# Patient Record
Sex: MALE | Race: WHITE | NOT HISPANIC OR LATINO | URBAN - METROPOLITAN AREA
[De-identification: names, ages, dates, MRNs, and addresses within clinical notes are randomized per-mention and may not be internally consistent; named-entity substitution may affect disease eponyms.]

---

## 2017-03-31 ENCOUNTER — OUTPATIENT (OUTPATIENT)
Dept: OUTPATIENT SERVICES | Facility: HOSPITAL | Age: 52
LOS: 1 days | End: 2017-03-31

## 2017-04-21 ENCOUNTER — EMERGENCY (EMERGENCY)
Facility: HOSPITAL | Age: 52
LOS: 1 days | End: 2017-04-21
Payer: MEDICARE

## 2017-04-21 PROCEDURE — 74020: CPT | Mod: 26

## 2017-04-21 PROCEDURE — 99284 EMERGENCY DEPT VISIT MOD MDM: CPT

## 2017-04-21 PROCEDURE — 71260 CT THORAX DX C+: CPT | Mod: 26

## 2017-04-21 PROCEDURE — 74177 CT ABD & PELVIS W/CONTRAST: CPT | Mod: 26

## 2017-04-21 PROCEDURE — 71020: CPT | Mod: 26

## 2017-04-22 PROCEDURE — 73610 X-RAY EXAM OF ANKLE: CPT | Mod: 26,LT

## 2017-05-31 ENCOUNTER — OUTPATIENT (OUTPATIENT)
Dept: OUTPATIENT SERVICES | Facility: HOSPITAL | Age: 52
LOS: 1 days | End: 2017-05-31

## 2017-07-21 ENCOUNTER — OUTPATIENT (OUTPATIENT)
Dept: OUTPATIENT SERVICES | Facility: HOSPITAL | Age: 52
LOS: 1 days | End: 2017-07-21

## 2017-08-27 ENCOUNTER — OUTPATIENT (OUTPATIENT)
Dept: OUTPATIENT SERVICES | Facility: HOSPITAL | Age: 52
LOS: 1 days | End: 2017-08-27

## 2017-10-04 ENCOUNTER — OUTPATIENT (OUTPATIENT)
Dept: OUTPATIENT SERVICES | Facility: HOSPITAL | Age: 52
LOS: 1 days | End: 2017-10-04

## 2017-10-04 ENCOUNTER — INPATIENT (INPATIENT)
Facility: HOSPITAL | Age: 52
LOS: 5 days | Discharge: HOME | End: 2017-10-10
Payer: MEDICARE

## 2017-10-04 PROCEDURE — 74177 CT ABD & PELVIS W/CONTRAST: CPT | Mod: 26

## 2017-10-04 PROCEDURE — 71020: CPT | Mod: 26

## 2017-10-04 PROCEDURE — 99285 EMERGENCY DEPT VISIT HI MDM: CPT

## 2017-10-04 PROCEDURE — 74000: CPT | Mod: 26

## 2017-10-05 ENCOUNTER — OUTPATIENT (OUTPATIENT)
Dept: OUTPATIENT SERVICES | Facility: HOSPITAL | Age: 52
LOS: 1 days | End: 2017-10-05

## 2017-10-06 ENCOUNTER — OUTPATIENT (OUTPATIENT)
Dept: OUTPATIENT SERVICES | Facility: HOSPITAL | Age: 52
LOS: 1 days | End: 2017-10-06

## 2017-10-07 ENCOUNTER — OUTPATIENT (OUTPATIENT)
Dept: OUTPATIENT SERVICES | Facility: HOSPITAL | Age: 52
LOS: 1 days | End: 2017-10-07

## 2017-10-08 ENCOUNTER — OUTPATIENT (OUTPATIENT)
Dept: OUTPATIENT SERVICES | Facility: HOSPITAL | Age: 52
LOS: 1 days | End: 2017-10-08

## 2017-10-09 ENCOUNTER — OUTPATIENT (OUTPATIENT)
Dept: OUTPATIENT SERVICES | Facility: HOSPITAL | Age: 52
LOS: 1 days | End: 2017-10-09

## 2017-10-10 ENCOUNTER — OUTPATIENT (OUTPATIENT)
Dept: OUTPATIENT SERVICES | Facility: HOSPITAL | Age: 52
LOS: 1 days | End: 2017-10-10

## 2017-10-11 ENCOUNTER — OUTPATIENT (OUTPATIENT)
Dept: OUTPATIENT SERVICES | Facility: HOSPITAL | Age: 52
LOS: 1 days | End: 2017-10-11

## 2017-10-23 ENCOUNTER — OUTPATIENT (OUTPATIENT)
Dept: OUTPATIENT SERVICES | Facility: HOSPITAL | Age: 52
LOS: 1 days | End: 2017-10-23

## 2017-10-31 ENCOUNTER — OUTPATIENT (OUTPATIENT)
Dept: OUTPATIENT SERVICES | Facility: HOSPITAL | Age: 52
LOS: 1 days | End: 2017-10-31

## 2017-12-11 ENCOUNTER — EMERGENCY (EMERGENCY)
Facility: HOSPITAL | Age: 52
LOS: 1 days | End: 2017-12-11
Payer: MEDICARE

## 2017-12-11 ENCOUNTER — OUTPATIENT (OUTPATIENT)
Dept: OUTPATIENT SERVICES | Facility: HOSPITAL | Age: 52
LOS: 1 days | End: 2017-12-11
Payer: MEDICARE

## 2017-12-11 PROCEDURE — 71250 CT THORAX DX C-: CPT | Mod: 26

## 2017-12-11 PROCEDURE — 74230 X-RAY XM SWLNG FUNCJ C+: CPT | Mod: 26

## 2017-12-11 PROCEDURE — 71010: CPT | Mod: 26

## 2017-12-11 PROCEDURE — 99285 EMERGENCY DEPT VISIT HI MDM: CPT

## 2018-01-24 PROCEDURE — 74177 CT ABD & PELVIS W/CONTRAST: CPT | Mod: 26

## 2018-01-24 PROCEDURE — 71045 X-RAY EXAM CHEST 1 VIEW: CPT | Mod: 26

## 2018-01-24 PROCEDURE — 99285 EMERGENCY DEPT VISIT HI MDM: CPT

## 2018-01-25 ENCOUNTER — INPATIENT (INPATIENT)
Facility: HOSPITAL | Age: 53
LOS: 5 days | Discharge: HOME | End: 2018-01-31
Payer: MEDICARE

## 2018-01-25 ENCOUNTER — OUTPATIENT (OUTPATIENT)
Dept: OUTPATIENT SERVICES | Facility: HOSPITAL | Age: 53
LOS: 1 days | End: 2018-01-25

## 2018-01-26 ENCOUNTER — OUTPATIENT (OUTPATIENT)
Dept: OUTPATIENT SERVICES | Facility: HOSPITAL | Age: 53
LOS: 1 days | End: 2018-01-26

## 2018-01-26 PROCEDURE — 71045 X-RAY EXAM CHEST 1 VIEW: CPT | Mod: 26

## 2018-01-27 ENCOUNTER — OUTPATIENT (OUTPATIENT)
Dept: OUTPATIENT SERVICES | Facility: HOSPITAL | Age: 53
LOS: 1 days | End: 2018-01-27

## 2018-01-28 ENCOUNTER — OUTPATIENT (OUTPATIENT)
Dept: OUTPATIENT SERVICES | Facility: HOSPITAL | Age: 53
LOS: 1 days | End: 2018-01-28

## 2018-01-29 ENCOUNTER — OUTPATIENT (OUTPATIENT)
Dept: OUTPATIENT SERVICES | Facility: HOSPITAL | Age: 53
LOS: 1 days | End: 2018-01-29

## 2018-01-30 ENCOUNTER — OUTPATIENT (OUTPATIENT)
Dept: OUTPATIENT SERVICES | Facility: HOSPITAL | Age: 53
LOS: 1 days | End: 2018-01-30

## 2018-01-30 PROCEDURE — 74230 X-RAY XM SWLNG FUNCJ C+: CPT | Mod: 26

## 2018-01-31 ENCOUNTER — OUTPATIENT (OUTPATIENT)
Dept: OUTPATIENT SERVICES | Facility: HOSPITAL | Age: 53
LOS: 1 days | End: 2018-01-31

## 2018-03-27 ENCOUNTER — INPATIENT (INPATIENT)
Facility: HOSPITAL | Age: 53
LOS: 7 days | Discharge: NURSING, REHAB OR LONG-TERM CARE | End: 2018-04-04
Payer: MEDICARE

## 2018-03-27 ENCOUNTER — OUTPATIENT (OUTPATIENT)
Dept: OUTPATIENT SERVICES | Facility: HOSPITAL | Age: 53
LOS: 1 days | End: 2018-03-27

## 2018-03-27 ENCOUNTER — OUTPATIENT (OUTPATIENT)
Dept: OUTPATIENT SERVICES | Facility: HOSPITAL | Age: 53
LOS: 1 days | End: 2018-03-27
Payer: MEDICARE

## 2018-03-27 PROCEDURE — 74230 X-RAY XM SWLNG FUNCJ C+: CPT | Mod: 26

## 2018-03-27 PROCEDURE — 71045 X-RAY EXAM CHEST 1 VIEW: CPT | Mod: 26

## 2018-03-27 PROCEDURE — 99284 EMERGENCY DEPT VISIT MOD MDM: CPT

## 2018-03-28 ENCOUNTER — OUTPATIENT (OUTPATIENT)
Dept: OUTPATIENT SERVICES | Facility: HOSPITAL | Age: 53
LOS: 1 days | End: 2018-03-28

## 2018-03-28 PROCEDURE — 93306 TTE W/DOPPLER COMPLETE: CPT | Mod: 26

## 2018-03-28 PROCEDURE — 99223 1ST HOSP IP/OBS HIGH 75: CPT

## 2018-03-29 ENCOUNTER — OUTPATIENT (OUTPATIENT)
Dept: OUTPATIENT SERVICES | Facility: HOSPITAL | Age: 53
LOS: 1 days | End: 2018-03-29

## 2018-03-29 PROCEDURE — 99232 SBSQ HOSP IP/OBS MODERATE 35: CPT

## 2018-03-30 ENCOUNTER — OUTPATIENT (OUTPATIENT)
Dept: OUTPATIENT SERVICES | Facility: HOSPITAL | Age: 53
LOS: 1 days | End: 2018-03-30

## 2018-03-31 ENCOUNTER — OUTPATIENT (OUTPATIENT)
Dept: OUTPATIENT SERVICES | Facility: HOSPITAL | Age: 53
LOS: 1 days | End: 2018-03-31

## 2018-04-01 ENCOUNTER — OUTPATIENT (OUTPATIENT)
Dept: OUTPATIENT SERVICES | Facility: HOSPITAL | Age: 53
LOS: 1 days | End: 2018-04-01

## 2018-04-02 ENCOUNTER — OUTPATIENT (OUTPATIENT)
Dept: OUTPATIENT SERVICES | Facility: HOSPITAL | Age: 53
LOS: 1 days | End: 2018-04-02

## 2018-05-17 ENCOUNTER — OUTPATIENT (OUTPATIENT)
Dept: OUTPATIENT SERVICES | Facility: HOSPITAL | Age: 53
LOS: 1 days | End: 2018-05-17

## 2018-07-18 ENCOUNTER — OUTPATIENT (OUTPATIENT)
Dept: OUTPATIENT SERVICES | Facility: HOSPITAL | Age: 53
LOS: 1 days | End: 2018-07-18

## 2018-07-26 ENCOUNTER — EMERGENCY (EMERGENCY)
Facility: HOSPITAL | Age: 53
LOS: 1 days | End: 2018-07-26
Payer: MEDICARE

## 2018-07-26 ENCOUNTER — INPATIENT (INPATIENT)
Facility: HOSPITAL | Age: 53
LOS: 10 days | Discharge: NURSING, REHAB OR LONG-TERM CARE | DRG: 82 | End: 2018-08-06
Attending: SURGERY | Admitting: SURGERY
Payer: MEDICARE

## 2018-07-26 VITALS — HEART RATE: 98 BPM | OXYGEN SATURATION: 99 %

## 2018-07-26 DIAGNOSIS — S06.5X9A TRAUMATIC SUBDURAL HEMORRHAGE WITH LOSS OF CONSCIOUSNESS OF UNSPECIFIED DURATION, INITIAL ENCOUNTER: ICD-10-CM

## 2018-07-26 LAB
ALBUMIN SERPL ELPH-MCNC: 3.2 G/DL — LOW (ref 3.3–5.2)
ALP SERPL-CCNC: 106 U/L — SIGNIFICANT CHANGE UP (ref 40–120)
ALT FLD-CCNC: 48 U/L — HIGH
ANION GAP SERPL CALC-SCNC: 11 MMOL/L — SIGNIFICANT CHANGE UP (ref 5–17)
ANION GAP SERPL CALC-SCNC: 12 MMOL/L — SIGNIFICANT CHANGE UP (ref 5–17)
ANISOCYTOSIS BLD QL: SLIGHT — SIGNIFICANT CHANGE UP
APPEARANCE UR: CLEAR — SIGNIFICANT CHANGE UP
APTT BLD: 40.5 SEC — HIGH (ref 27.5–37.4)
AST SERPL-CCNC: 45 U/L — HIGH
BASOPHILS # BLD AUTO: 0 K/UL — SIGNIFICANT CHANGE UP (ref 0–0.2)
BASOPHILS NFR BLD AUTO: 0.1 % — SIGNIFICANT CHANGE UP (ref 0–2)
BILIRUB SERPL-MCNC: 0.5 MG/DL — SIGNIFICANT CHANGE UP (ref 0.4–2)
BILIRUB UR-MCNC: NEGATIVE — SIGNIFICANT CHANGE UP
BLD GP AB SCN SERPL QL: SIGNIFICANT CHANGE UP
BUN SERPL-MCNC: 19 MG/DL — SIGNIFICANT CHANGE UP (ref 8–20)
BUN SERPL-MCNC: 24 MG/DL — HIGH (ref 8–20)
CALCIUM SERPL-MCNC: 6.4 MG/DL — CRITICAL LOW (ref 8.6–10.2)
CALCIUM SERPL-MCNC: 8.4 MG/DL — LOW (ref 8.6–10.2)
CHLORIDE SERPL-SCNC: 106 MMOL/L — SIGNIFICANT CHANGE UP (ref 98–107)
CHLORIDE SERPL-SCNC: 114 MMOL/L — HIGH (ref 98–107)
CK SERPL-CCNC: 48 U/L — SIGNIFICANT CHANGE UP (ref 30–200)
CO2 SERPL-SCNC: 15 MMOL/L — LOW (ref 22–29)
CO2 SERPL-SCNC: 23 MMOL/L — SIGNIFICANT CHANGE UP (ref 22–29)
COLOR SPEC: YELLOW — SIGNIFICANT CHANGE UP
CREAT SERPL-MCNC: 0.46 MG/DL — LOW (ref 0.5–1.3)
CREAT SERPL-MCNC: 0.56 MG/DL — SIGNIFICANT CHANGE UP (ref 0.5–1.3)
DIFF PNL FLD: NEGATIVE — SIGNIFICANT CHANGE UP
EOSINOPHIL # BLD AUTO: 0 K/UL — SIGNIFICANT CHANGE UP (ref 0–0.5)
EOSINOPHIL NFR BLD AUTO: 0.1 % — SIGNIFICANT CHANGE UP (ref 0–5)
EPI CELLS # UR: SIGNIFICANT CHANGE UP
ETHANOL SERPL-MCNC: <10 MG/DL — SIGNIFICANT CHANGE UP
GAS PNL BLDA: SIGNIFICANT CHANGE UP
GAS PNL BLDA: SIGNIFICANT CHANGE UP
GLUCOSE BLDC GLUCOMTR-MCNC: 139 MG/DL — HIGH (ref 70–99)
GLUCOSE SERPL-MCNC: 140 MG/DL — HIGH (ref 70–115)
GLUCOSE SERPL-MCNC: 85 MG/DL — SIGNIFICANT CHANGE UP (ref 70–115)
GLUCOSE UR QL: NEGATIVE MG/DL — SIGNIFICANT CHANGE UP
HCT VFR BLD CALC: 32.7 % — LOW (ref 42–52)
HCT VFR BLD CALC: 34.1 % — LOW (ref 42–52)
HGB BLD-MCNC: 10.7 G/DL — LOW (ref 14–18)
HGB BLD-MCNC: 11 G/DL — LOW (ref 14–18)
HYPOCHROMIA BLD QL: SLIGHT — SIGNIFICANT CHANGE UP
INR BLD: 1.14 RATIO — SIGNIFICANT CHANGE UP (ref 0.88–1.16)
KETONES UR-MCNC: ABNORMAL
LEUKOCYTE ESTERASE UR-ACNC: NEGATIVE — SIGNIFICANT CHANGE UP
LIDOCAIN IGE QN: 35 U/L — SIGNIFICANT CHANGE UP (ref 22–51)
LYMPHOCYTES # BLD AUTO: 1.1 K/UL — SIGNIFICANT CHANGE UP (ref 1–4.8)
LYMPHOCYTES # BLD AUTO: 12 % — LOW (ref 20–55)
LYMPHOCYTES # BLD AUTO: 9.4 % — LOW (ref 20–55)
MACROCYTES BLD QL: SLIGHT — SIGNIFICANT CHANGE UP
MAGNESIUM SERPL-MCNC: 1.2 MG/DL — LOW (ref 1.6–2.6)
MCHC RBC-ENTMCNC: 29.8 PG — SIGNIFICANT CHANGE UP (ref 27–31)
MCHC RBC-ENTMCNC: 29.8 PG — SIGNIFICANT CHANGE UP (ref 27–31)
MCHC RBC-ENTMCNC: 32.3 G/DL — SIGNIFICANT CHANGE UP (ref 32–36)
MCHC RBC-ENTMCNC: 32.7 G/DL — SIGNIFICANT CHANGE UP (ref 32–36)
MCV RBC AUTO: 91.1 FL — SIGNIFICANT CHANGE UP (ref 80–94)
MCV RBC AUTO: 92.4 FL — SIGNIFICANT CHANGE UP (ref 80–94)
MICROCYTES BLD QL: SLIGHT — SIGNIFICANT CHANGE UP
MONOCYTES # BLD AUTO: 0.4 K/UL — SIGNIFICANT CHANGE UP (ref 0–0.8)
MONOCYTES NFR BLD AUTO: 3.2 % — SIGNIFICANT CHANGE UP (ref 3–10)
MONOCYTES NFR BLD AUTO: 4 % — SIGNIFICANT CHANGE UP (ref 3–10)
NEUTROPHILS # BLD AUTO: 10.3 K/UL — HIGH (ref 1.8–8)
NEUTROPHILS NFR BLD AUTO: 82 % — HIGH (ref 37–73)
NEUTROPHILS NFR BLD AUTO: 86.9 % — HIGH (ref 37–73)
NITRITE UR-MCNC: NEGATIVE — SIGNIFICANT CHANGE UP
PH UR: 8 — SIGNIFICANT CHANGE UP (ref 5–8)
PHOSPHATE SERPL-MCNC: 2.4 MG/DL — SIGNIFICANT CHANGE UP (ref 2.4–4.7)
PLAT MORPH BLD: NORMAL — SIGNIFICANT CHANGE UP
PLATELET # BLD AUTO: 107 K/UL — LOW (ref 150–400)
PLATELET # BLD AUTO: 83 K/UL — LOW (ref 150–400)
POIKILOCYTOSIS BLD QL AUTO: SLIGHT — SIGNIFICANT CHANGE UP
POTASSIUM SERPL-MCNC: 3.9 MMOL/L — SIGNIFICANT CHANGE UP (ref 3.5–5.3)
POTASSIUM SERPL-MCNC: 4.1 MMOL/L — SIGNIFICANT CHANGE UP (ref 3.5–5.3)
POTASSIUM SERPL-SCNC: 3.9 MMOL/L — SIGNIFICANT CHANGE UP (ref 3.5–5.3)
POTASSIUM SERPL-SCNC: 4.1 MMOL/L — SIGNIFICANT CHANGE UP (ref 3.5–5.3)
PROT SERPL-MCNC: 6.3 G/DL — LOW (ref 6.6–8.7)
PROT UR-MCNC: 15 MG/DL
PROTHROM AB SERPL-ACNC: 12.6 SEC — SIGNIFICANT CHANGE UP (ref 9.8–12.7)
RBC # BLD: 3.59 M/UL — LOW (ref 4.6–6.2)
RBC # BLD: 3.69 M/UL — LOW (ref 4.6–6.2)
RBC # FLD: 16.4 % — HIGH (ref 11–15.6)
RBC # FLD: 16.5 % — HIGH (ref 11–15.6)
RBC BLD AUTO: ABNORMAL
SODIUM SERPL-SCNC: 140 MMOL/L — SIGNIFICANT CHANGE UP (ref 135–145)
SODIUM SERPL-SCNC: 141 MMOL/L — SIGNIFICANT CHANGE UP (ref 135–145)
SP GR SPEC: 1.01 — SIGNIFICANT CHANGE UP (ref 1.01–1.02)
T3 SERPL-MCNC: 88 NG/DL — SIGNIFICANT CHANGE UP (ref 80–200)
T4 AB SER-ACNC: 5.6 UG/DL — SIGNIFICANT CHANGE UP (ref 4.5–12)
TROPONIN T SERPL-MCNC: <0.01 NG/ML — SIGNIFICANT CHANGE UP (ref 0–0.06)
TSH SERPL-MCNC: 3.75 UIU/ML — SIGNIFICANT CHANGE UP (ref 0.27–4.2)
TYPE + AB SCN PNL BLD: SIGNIFICANT CHANGE UP
UROBILINOGEN FLD QL: NEGATIVE MG/DL — SIGNIFICANT CHANGE UP
VALPROATE SERPL-MCNC: 11 UG/ML — LOW (ref 50–100)
VARIANT LYMPHS # BLD: 2 % — SIGNIFICANT CHANGE UP (ref 0–6)
WBC # BLD: 11.9 K/UL — HIGH (ref 4.8–10.8)
WBC # BLD: 7.3 K/UL — SIGNIFICANT CHANGE UP (ref 4.8–10.8)
WBC # FLD AUTO: 11.9 K/UL — HIGH (ref 4.8–10.8)
WBC # FLD AUTO: 7.3 K/UL — SIGNIFICANT CHANGE UP (ref 4.8–10.8)

## 2018-07-26 PROCEDURE — 70450 CT HEAD/BRAIN W/O DYE: CPT | Mod: 26

## 2018-07-26 PROCEDURE — 99291 CRITICAL CARE FIRST HOUR: CPT | Mod: GC,25

## 2018-07-26 PROCEDURE — 99291 CRITICAL CARE FIRST HOUR: CPT | Mod: 25

## 2018-07-26 PROCEDURE — 99222 1ST HOSP IP/OBS MODERATE 55: CPT | Mod: 25

## 2018-07-26 PROCEDURE — 36556 INSERT NON-TUNNEL CV CATH: CPT

## 2018-07-26 PROCEDURE — 93306 TTE W/DOPPLER COMPLETE: CPT | Mod: 26

## 2018-07-26 PROCEDURE — 93010 ELECTROCARDIOGRAM REPORT: CPT | Mod: 76

## 2018-07-26 PROCEDURE — 72125 CT NECK SPINE W/O DYE: CPT | Mod: 26,77

## 2018-07-26 PROCEDURE — 71045 X-RAY EXAM CHEST 1 VIEW: CPT | Mod: 26

## 2018-07-26 PROCEDURE — 71260 CT THORAX DX C+: CPT | Mod: 26

## 2018-07-26 PROCEDURE — 36620 INSERTION CATHETER ARTERY: CPT

## 2018-07-26 PROCEDURE — 70450 CT HEAD/BRAIN W/O DYE: CPT | Mod: 26,77

## 2018-07-26 PROCEDURE — 74177 CT ABD & PELVIS W/CONTRAST: CPT | Mod: 26

## 2018-07-26 PROCEDURE — 31500 INSERT EMERGENCY AIRWAY: CPT

## 2018-07-26 PROCEDURE — 71045 X-RAY EXAM CHEST 1 VIEW: CPT | Mod: 26,77

## 2018-07-26 PROCEDURE — 72125 CT NECK SPINE W/O DYE: CPT | Mod: 26

## 2018-07-26 PROCEDURE — 72170 X-RAY EXAM OF PELVIS: CPT | Mod: 26

## 2018-07-26 RX ORDER — GLUCAGON INJECTION, SOLUTION 0.5 MG/.1ML
1 INJECTION, SOLUTION SUBCUTANEOUS ONCE
Qty: 0 | Refills: 0 | Status: DISCONTINUED | OUTPATIENT
Start: 2018-07-26 | End: 2018-07-26

## 2018-07-26 RX ORDER — VALPROIC ACID (AS SODIUM SALT) 250 MG/5ML
500 SOLUTION, ORAL ORAL
Qty: 0 | Refills: 0 | Status: DISCONTINUED | OUTPATIENT
Start: 2018-07-26 | End: 2018-08-03

## 2018-07-26 RX ORDER — AMIODARONE HYDROCHLORIDE 400 MG/1
0.5 TABLET ORAL
Qty: 900 | Refills: 0 | Status: DISCONTINUED | OUTPATIENT
Start: 2018-07-26 | End: 2018-07-27

## 2018-07-26 RX ORDER — FENTANYL CITRATE 50 UG/ML
50 INJECTION INTRAVENOUS
Qty: 0 | Refills: 0 | Status: DISCONTINUED | OUTPATIENT
Start: 2018-07-26 | End: 2018-07-26

## 2018-07-26 RX ORDER — FENTANYL CITRATE 50 UG/ML
100 INJECTION INTRAVENOUS ONCE
Qty: 0 | Refills: 0 | Status: DISCONTINUED | OUTPATIENT
Start: 2018-07-26 | End: 2018-07-26

## 2018-07-26 RX ORDER — MAGNESIUM SULFATE 500 MG/ML
2 VIAL (ML) INJECTION
Qty: 0 | Refills: 0 | Status: COMPLETED | OUTPATIENT
Start: 2018-07-26 | End: 2018-07-26

## 2018-07-26 RX ORDER — SODIUM CHLORIDE 9 MG/ML
1000 INJECTION INTRAMUSCULAR; INTRAVENOUS; SUBCUTANEOUS ONCE
Qty: 0 | Refills: 0 | Status: COMPLETED | OUTPATIENT
Start: 2018-07-26 | End: 2018-07-26

## 2018-07-26 RX ORDER — PIPERACILLIN AND TAZOBACTAM 4; .5 G/20ML; G/20ML
3.38 INJECTION, POWDER, LYOPHILIZED, FOR SOLUTION INTRAVENOUS EVERY 8 HOURS
Qty: 0 | Refills: 0 | Status: DISCONTINUED | OUTPATIENT
Start: 2018-07-26 | End: 2018-07-30

## 2018-07-26 RX ORDER — FENTANYL CITRATE 50 UG/ML
50 INJECTION INTRAVENOUS ONCE
Qty: 0 | Refills: 0 | Status: DISCONTINUED | OUTPATIENT
Start: 2018-07-26 | End: 2018-07-26

## 2018-07-26 RX ORDER — POTASSIUM PHOSPHATE, MONOBASIC POTASSIUM PHOSPHATE, DIBASIC 236; 224 MG/ML; MG/ML
15 INJECTION, SOLUTION INTRAVENOUS ONCE
Qty: 0 | Refills: 0 | Status: COMPLETED | OUTPATIENT
Start: 2018-07-26 | End: 2018-07-26

## 2018-07-26 RX ORDER — NOREPINEPHRINE BITARTRATE/D5W 8 MG/250ML
0.05 PLASTIC BAG, INJECTION (ML) INTRAVENOUS
Qty: 8 | Refills: 0 | Status: DISCONTINUED | OUTPATIENT
Start: 2018-07-26 | End: 2018-07-27

## 2018-07-26 RX ORDER — SODIUM CHLORIDE 9 MG/ML
1000 INJECTION, SOLUTION INTRAVENOUS
Qty: 0 | Refills: 0 | Status: DISCONTINUED | OUTPATIENT
Start: 2018-07-26 | End: 2018-07-26

## 2018-07-26 RX ORDER — INSULIN LISPRO 100/ML
VIAL (ML) SUBCUTANEOUS EVERY 6 HOURS
Qty: 0 | Refills: 0 | Status: DISCONTINUED | OUTPATIENT
Start: 2018-07-26 | End: 2018-07-28

## 2018-07-26 RX ORDER — AMIODARONE HYDROCHLORIDE 400 MG/1
150 TABLET ORAL ONCE
Qty: 0 | Refills: 0 | Status: COMPLETED | OUTPATIENT
Start: 2018-07-26 | End: 2018-07-26

## 2018-07-26 RX ORDER — DEXTROSE 50 % IN WATER 50 %
25 SYRINGE (ML) INTRAVENOUS ONCE
Qty: 0 | Refills: 0 | Status: DISCONTINUED | OUTPATIENT
Start: 2018-07-26 | End: 2018-07-26

## 2018-07-26 RX ORDER — DEXTROSE 50 % IN WATER 50 %
15 SYRINGE (ML) INTRAVENOUS ONCE
Qty: 0 | Refills: 0 | Status: DISCONTINUED | OUTPATIENT
Start: 2018-07-26 | End: 2018-07-26

## 2018-07-26 RX ORDER — FENTANYL CITRATE 50 UG/ML
0.5 INJECTION INTRAVENOUS
Qty: 5000 | Refills: 0 | Status: DISCONTINUED | OUTPATIENT
Start: 2018-07-26 | End: 2018-07-26

## 2018-07-26 RX ORDER — CLONAZEPAM 1 MG
0 TABLET ORAL
Qty: 0 | Refills: 0 | COMMUNITY

## 2018-07-26 RX ORDER — PANTOPRAZOLE SODIUM 20 MG/1
40 TABLET, DELAYED RELEASE ORAL DAILY
Qty: 0 | Refills: 0 | Status: DISCONTINUED | OUTPATIENT
Start: 2018-07-26 | End: 2018-07-28

## 2018-07-26 RX ORDER — VASOPRESSIN 20 [USP'U]/ML
0.04 INJECTION INTRAVENOUS
Qty: 100 | Refills: 0 | Status: DISCONTINUED | OUTPATIENT
Start: 2018-07-26 | End: 2018-07-27

## 2018-07-26 RX ORDER — ROCURONIUM BROMIDE 10 MG/ML
50 VIAL (ML) INTRAVENOUS ONCE
Qty: 0 | Refills: 0 | Status: COMPLETED | OUTPATIENT
Start: 2018-07-26 | End: 2018-07-26

## 2018-07-26 RX ORDER — DEXTROSE 50 % IN WATER 50 %
12.5 SYRINGE (ML) INTRAVENOUS ONCE
Qty: 0 | Refills: 0 | Status: DISCONTINUED | OUTPATIENT
Start: 2018-07-26 | End: 2018-07-26

## 2018-07-26 RX ORDER — SODIUM CHLORIDE 9 MG/ML
1000 INJECTION INTRAMUSCULAR; INTRAVENOUS; SUBCUTANEOUS
Qty: 0 | Refills: 0 | Status: DISCONTINUED | OUTPATIENT
Start: 2018-07-26 | End: 2018-07-29

## 2018-07-26 RX ORDER — CHLORHEXIDINE GLUCONATE 213 G/1000ML
1 SOLUTION TOPICAL DAILY
Qty: 0 | Refills: 0 | Status: DISCONTINUED | OUTPATIENT
Start: 2018-07-26 | End: 2018-08-06

## 2018-07-26 RX ORDER — MIDAZOLAM HYDROCHLORIDE 1 MG/ML
2 INJECTION, SOLUTION INTRAMUSCULAR; INTRAVENOUS ONCE
Qty: 0 | Refills: 0 | Status: DISCONTINUED | OUTPATIENT
Start: 2018-07-26 | End: 2018-07-26

## 2018-07-26 RX ORDER — ACETAMINOPHEN 500 MG
1000 TABLET ORAL ONCE
Qty: 0 | Refills: 0 | Status: COMPLETED | OUTPATIENT
Start: 2018-07-26 | End: 2018-07-26

## 2018-07-26 RX ORDER — CHLORHEXIDINE GLUCONATE 213 G/1000ML
15 SOLUTION TOPICAL
Qty: 0 | Refills: 0 | Status: DISCONTINUED | OUTPATIENT
Start: 2018-07-26 | End: 2018-07-28

## 2018-07-26 RX ORDER — MIDAZOLAM HYDROCHLORIDE 1 MG/ML
2 INJECTION, SOLUTION INTRAMUSCULAR; INTRAVENOUS
Qty: 0 | Refills: 0 | Status: DISCONTINUED | OUTPATIENT
Start: 2018-07-26 | End: 2018-07-26

## 2018-07-26 RX ADMIN — FENTANYL CITRATE 50 MICROGRAM(S): 50 INJECTION INTRAVENOUS at 13:15

## 2018-07-26 RX ADMIN — SODIUM CHLORIDE 4000 MILLILITER(S): 9 INJECTION INTRAMUSCULAR; INTRAVENOUS; SUBCUTANEOUS at 13:45

## 2018-07-26 RX ADMIN — AMIODARONE HYDROCHLORIDE 16.67 MG/MIN: 400 TABLET ORAL at 23:59

## 2018-07-26 RX ADMIN — FENTANYL CITRATE 50 MICROGRAM(S): 50 INJECTION INTRAVENOUS at 13:00

## 2018-07-26 RX ADMIN — SODIUM CHLORIDE 4000 MILLILITER(S): 9 INJECTION INTRAMUSCULAR; INTRAVENOUS; SUBCUTANEOUS at 11:00

## 2018-07-26 RX ADMIN — PIPERACILLIN AND TAZOBACTAM 25 GRAM(S): 4; .5 INJECTION, POWDER, LYOPHILIZED, FOR SOLUTION INTRAVENOUS at 15:34

## 2018-07-26 RX ADMIN — MIDAZOLAM HYDROCHLORIDE 2 MILLIGRAM(S): 1 INJECTION, SOLUTION INTRAMUSCULAR; INTRAVENOUS at 13:30

## 2018-07-26 RX ADMIN — FENTANYL CITRATE 50 MICROGRAM(S): 50 INJECTION INTRAVENOUS at 17:15

## 2018-07-26 RX ADMIN — CHLORHEXIDINE GLUCONATE 1 APPLICATION(S): 213 SOLUTION TOPICAL at 18:30

## 2018-07-26 RX ADMIN — Medication 5.84 MICROGRAM(S)/KG/MIN: at 13:05

## 2018-07-26 RX ADMIN — PANTOPRAZOLE SODIUM 40 MILLIGRAM(S): 20 TABLET, DELAYED RELEASE ORAL at 18:44

## 2018-07-26 RX ADMIN — SODIUM CHLORIDE 4000 MILLILITER(S): 9 INJECTION INTRAMUSCULAR; INTRAVENOUS; SUBCUTANEOUS at 13:30

## 2018-07-26 RX ADMIN — AMIODARONE HYDROCHLORIDE 618 MILLIGRAM(S): 400 TABLET ORAL at 14:30

## 2018-07-26 RX ADMIN — Medication 1000 MILLIGRAM(S): at 16:24

## 2018-07-26 RX ADMIN — Medication 50 GRAM(S): at 18:41

## 2018-07-26 RX ADMIN — SODIUM CHLORIDE 4000 MILLILITER(S): 9 INJECTION INTRAMUSCULAR; INTRAVENOUS; SUBCUTANEOUS at 13:00

## 2018-07-26 RX ADMIN — POTASSIUM PHOSPHATE, MONOBASIC POTASSIUM PHOSPHATE, DIBASIC 62.5 MILLIMOLE(S): 236; 224 INJECTION, SOLUTION INTRAVENOUS at 19:24

## 2018-07-26 RX ADMIN — FENTANYL CITRATE 50 MICROGRAM(S): 50 INJECTION INTRAVENOUS at 15:59

## 2018-07-26 RX ADMIN — MIDAZOLAM HYDROCHLORIDE 2 MILLIGRAM(S): 1 INJECTION, SOLUTION INTRAMUSCULAR; INTRAVENOUS at 17:00

## 2018-07-26 RX ADMIN — VASOPRESSIN 2.4 UNIT(S)/MIN: 20 INJECTION INTRAVENOUS at 16:00

## 2018-07-26 RX ADMIN — Medication 400 MILLIGRAM(S): at 16:09

## 2018-07-26 RX ADMIN — Medication 50 MILLIGRAM(S): at 11:00

## 2018-07-26 RX ADMIN — FENTANYL CITRATE 50 MICROGRAM(S): 50 INJECTION INTRAVENOUS at 17:45

## 2018-07-26 RX ADMIN — FENTANYL CITRATE 50 MICROGRAM(S): 50 INJECTION INTRAVENOUS at 17:00

## 2018-07-26 RX ADMIN — SODIUM CHLORIDE 1000 MILLILITER(S): 9 INJECTION INTRAMUSCULAR; INTRAVENOUS; SUBCUTANEOUS at 15:09

## 2018-07-26 RX ADMIN — Medication 50 GRAM(S): at 20:23

## 2018-07-26 RX ADMIN — SODIUM CHLORIDE 4000 MILLILITER(S): 9 INJECTION INTRAMUSCULAR; INTRAVENOUS; SUBCUTANEOUS at 12:45

## 2018-07-26 RX ADMIN — MIDAZOLAM HYDROCHLORIDE 2 MILLIGRAM(S): 1 INJECTION, SOLUTION INTRAMUSCULAR; INTRAVENOUS at 17:30

## 2018-07-26 RX ADMIN — SODIUM CHLORIDE 4000 MILLILITER(S): 9 INJECTION INTRAMUSCULAR; INTRAVENOUS; SUBCUTANEOUS at 12:30

## 2018-07-26 RX ADMIN — FENTANYL CITRATE 50 MICROGRAM(S): 50 INJECTION INTRAVENOUS at 17:30

## 2018-07-26 RX ADMIN — Medication 500 MILLIGRAM(S): at 18:45

## 2018-07-26 RX ADMIN — CHLORHEXIDINE GLUCONATE 15 MILLILITER(S): 213 SOLUTION TOPICAL at 18:30

## 2018-07-26 RX ADMIN — SODIUM CHLORIDE 4000 MILLILITER(S): 9 INJECTION INTRAMUSCULAR; INTRAVENOUS; SUBCUTANEOUS at 13:15

## 2018-07-26 RX ADMIN — FENTANYL CITRATE 50 MICROGRAM(S): 50 INJECTION INTRAVENOUS at 15:44

## 2018-07-26 RX ADMIN — SODIUM CHLORIDE 75 MILLILITER(S): 9 INJECTION INTRAMUSCULAR; INTRAVENOUS; SUBCUTANEOUS at 12:00

## 2018-07-26 NOTE — CHART NOTE - NSCHARTNOTEFT_GEN_A_CORE
AM head CT ordered at the request of Neurosurgery for follow up of hemorrhagic contusions seen on most recent CT.

## 2018-07-26 NOTE — PROCEDURE NOTE - NSPROCDETAILS_GEN_ALL_CORE
positive blood return obtained via catheter/sutured in place/connected to a pressurized flush line/location identified, draped/prepped, sterile technique used, needle inserted/introduced/Seldinger technique/all materials/supplies accounted for at end of procedure
sterile dressing applied/sterile technique, catheter placed/guidewire recovered/lumen(s) aspirated and flushed

## 2018-07-26 NOTE — CHART NOTE - NSCHARTNOTEFT_GEN_A_CORE
Patient sedation has been held since ~ 12:00, re-examined in SICU.    Off sedation. Intubated  Opens eyes to voice  Attempts to follow commands- when asked to show 2 fingers, raises both hands up- unsure if at baseline patient would be able to show 2 fingers. Did not follow when asked to grasp hand.   Purposeful w/ b/l upper extremities- attempted to grab ET tube, now restrained. Moves b/l lower extremities spontaneously/to noxious stimuli    PLTs 83- per group home RN, this is baseline, followed by hematology outpatient        A/P:  52 year old male PMH glaucoma, seizure disorder, mental retardation, last known well 21:30 7/25/18 found down on floor next to bed on right side 06:15 AM 7/26/18 found with left frontal SDH and falx SDH. Original GCS 7T    GCS now off sedation: 9T-10T    - D/w Dr. Herrera  - Repeat CT head ~17:00. Low threshold for stat CT head should exam deteriorate  - Continue to minimize sedation as much as possible to yield accurate neuro exams  - Q1 neuro checks, HOB 30 degrees  - Hold antiplatelets/anticoagulation for now  - D/w ACS about thrombocytopenia and mildly elevated PTT- per home RN platelets level is baseline for him. ACS working up for other underlying organic issues given thrombocytopenia, hypothermia, hypotension  - Supportive care/further medical management per primary team Patient sedation has been held since ~ 12:00, re-examined in SICU.    Off sedation. Intubated  Opens eyes to voice  Attempts to follow commands- when asked to show 2 fingers, raises both hands up- unsure if at baseline patient would be able to show 2 fingers, possible with prompting/mimicking. Did not follow when asked to grasp hand.   Purposeful w/ b/l upper extremities- attempted to grab ET tube, now restrained. Moves b/l lower extremities spontaneously/to noxious stimuli    PLTs 83- per group home RN, this is baseline, followed by hematology outpatient        A/P:  52 year old male PMH glaucoma, seizure disorder, mental retardation, last known well 21:30 7/25/18 found down on floor next to bed on right side 06:15 AM 7/26/18 found with left frontal SDH and falx SDH. Original GCS 7T    GCS now off sedation: 9T-10T    - D/w Dr. Herrera  - Repeat CT head ~17:00. Low threshold for stat CT head should exam deteriorate  - Continue to minimize sedation as much as possible to yield accurate neuro exams  - Q1 neuro checks, HOB 30 degrees  - Hold antiplatelets/anticoagulation for now  - D/w ACS about thrombocytopenia and mildly elevated PTT- per home RN platelets level is baseline for him. ACS working up for other underlying organic issues given thrombocytopenia, hypothermia, hypotension  - Supportive care/further medical management per primary team

## 2018-07-26 NOTE — ED PROVIDER NOTE - MEDICAL DECISION MAKING DETAILS
52yom trauma transfer from [facility] w/ epidural hematoma, intubated prior to arrival. Primary survey intact, secondary unremarkable. Further imaging directed by surgical/neurosurgical team, SICU admit

## 2018-07-26 NOTE — H&P ADULT - ASSESSMENT
52yoM with history of seizures, MR, and glaucoma found down at a group home. Patient was found on floor after an unwitnessed fall and brought to [facility] where he was found to have a left parafrontal epidural and a minimal subdural hematoma with no midline shift. Patient was intubated for airway protection. GCS 7T.    Plan:  - tertiary survey  - f/u labs  - f/u images: Ct head/c-spine/chest/pelvis/abd  - F/U Neuro Sx Dispo  - dispo: admit to SICU

## 2018-07-26 NOTE — H&P ADULT - NSHPPHYSICALEXAM_GEN_ALL_CORE
Constitutional: Well-developed well nourished male  HEENT: Head is normocephalic and atraumatic, maxillofacial structures stable, no blood or discharge from nares or oral cavity, no jarquin sign / racoon eyes, EOMI b/l, pupils 2mm round and not reactive on the R. On Left, 3mm and minimally reactive - became normally reactive when propofol was off, no active drainage or redness  Neck: cervical collar in place, trachea midline  Respiratory: Breath sounds CTA b/l respirations are unlabored. Intubated on ventilator  Cardiovascular: Regular rate & rhythm, +S1, S2  Chest: Chest wall is non-tender to palpation, no subQ emphysema or crepitus palpated  Gastrointestinal: Abdomen soft, non-tender, non-distended, no rebound tenderness / guarding, no ecchymosis or external signs of abdominal trauma  Extremities: ecchymosis on R medial knee aspect, drop foot of the L  Pelvis: stable  Vascular: 2+ radial, femoral, and DP pulses b/l  Neurological: GCS: 7T   Back: no C/T/LS spine tenderness to palpation, no step-offs or signs of external trauma to the back

## 2018-07-26 NOTE — CONSULT NOTE ADULT - ASSESSMENT
NOTE IS INCOMPLETE A/P: 52 year old male PMH glaucoma, seizure disorder, mental retardation, last known well 21:30 7/25/18 found down on floor next to bed on right side 06:15 AM 7/26/18 found with left frontal SDH and falx SDH.  - D/w Dr. Herrera  - Repeat CT head [facility] reviewed- no significant change in left frontal SDH and falx SDH  - Recommend minimizing sedation as much as possible to obtain accurate serial neurologic examinations  - Further imaging/plan pending serial neuro exam findings  - Q1 neuro checks, HOB 30 degrees  - SBP goal < 150  - Hold antiplatelets/anticoagulation for now  - SCDs for DVT ppx  - Supportive care/further medical management per SICU

## 2018-07-26 NOTE — CONSULT NOTE ADULT - ATTENDING COMMENTS
NSGY Attg:    see above    patient seen and examined; currently intubated and paralyzed    CT head -- stable left subdural/parafalcine hemorrhage compared to outside imaging    Case discussed with patient's caretakers.  At baseline, patient opens eyes and is conversant (but may not be appropriate with more complex conversation).  He follows commands to participate in his ADLs and with mimicking but may not necessarily follow single step commands such as showing two fingers or protruding his tongue without prompting.    A/P:  - will follow exam off paralytic/sedation  - if reproducible exam at baseline, can follow without ICP monitor  - if patient requires sedation for agitation or exam decreased from baseline/GCS 8 or less, will discuss EVD placement for ICP monitoring

## 2018-07-26 NOTE — CHART NOTE - NSCHARTNOTEFT_GEN_A_CORE
I reviewed all of patients medical records that were available from group home, no mention of atrial fibrillation, Patient is now in afib and hypotensive requiring pressors. Synchronized cardioversion done at 200 J x2 after 2 mg versed IVP given. Patient remains in afib after cardioversion, amiodarone bolus and gtt ordered. Dr. Garcia at bed side

## 2018-07-26 NOTE — H&P ADULT - HISTORY OF PRESENT ILLNESS
52yoM with history of seizures, MR, and glaucoma found down at a group home. Patient went to bed at 9:30pm and patient was found down at 6:30am. He is nonverbal and has severe agitation at baseline. Patient was taken to [facility] where he was found to have a Left parafrontal epidural and a minimal subdural hematoma with no midline shift. C-spine shows no fractures or subluxations. Patient was intubated for altered mental status and transfer. He was intubated with 70 of succinylcholine and 20 of etomidate. Sedated with propofol on 10/min. Intubation confirmed on chest xray. His VENT settings were on , PEEP 5, and 40% FiO2, satting at 95% O2. He was BIBEMS to [facility] trauma bay. A primary survey was started. Patient was intubated, c-collar placed, and GCS 3T. On secondary examination patient was found to have no obvious signs of external trauma.     In the trauma bay, patient was given 2 boluses, 10mcg Vecoronium     A: Intubated, on vent and propofol.  B: CTAB. Symmetrical chest rise  C: 2+ central (femoral) & peripheral pulses (Radial, DP)  D: GCS 3T.   E: No gross deformities on primary exposure    Vitals:  Temp:   HR:  BP:  RR:   SpO2: %    CXR: Negative for evidence of hemo/pneumothorax 52yoM with history of seizures, MR, and glaucoma found down at a group home. Patient went to bed at 9:30pm and patient was found down at 6:30am. He is nonverbal and has severe agitation at baseline. Patient was taken to [place] where he was found to have a Left parafrontal epidural and a minimal subdural hematoma with no midline shift. C-spine shows no fractures or subluxations. Patient was intubated for altered mental status and transfer. He was intubated with 70 of succinylcholine and 20 of etomidate. Sedated with propofol on 10/min. Intubation confirmed on chest xray. His VENT settings were on , PEEP 5, and 40% FiO2, satting at 95% O2. He was BIBEMS to [facility] trauma bay. A primary survey was started. Patient was intubated, c-collar placed, and GCS 7T. On secondary examination patient was found to have no obvious signs of external trauma.     Trauma bay interventions: patient was given 2 boluses, 50 Rocuronium, 5mg of morphine. 7.5 ET tube confirmed at 25cm at the lip, confirmed with chest xr. Viramontes catheter and OG tube placed.    A: Intubated, on vent and propofol.  B: CTAB. Symmetrical chest rise  C: 2+ central (femoral) & peripheral pulses (Radial, DP)  D: GCS 7T.   E: No gross deformities on primary exposure    Vitals:  HR: 98  BP: 87/54 RR: 14  SpO2: 98 % on vent.    CXR: Negative for evidence of hemo/pneumothorax

## 2018-07-26 NOTE — ED PROVIDER NOTE - ATTENDING CONTRIBUTION TO CARE
Pt. present to ED as a transfer. Pt. intubated. Pt. with stable vitals. I have discussed the plan with the resident. care transferred over to the trauma. team.

## 2018-07-26 NOTE — CONSULT NOTE ADULT - SUBJECTIVE AND OBJECTIVE BOX
HISTORY OF PRESENT ILLNESS:   52 year old male PMH seizure disorder (last seizure reportedly several years ago), severe MR domiciled in group home, last known well last night 21:30 when he went to bed, found this morning 06:30 found on the floor by staff. CT [place] [facility] [place]H/SDH and patient was transferred to [facility]. At baseline patient is nonverbal with severe agitation however per group home staff he appeared sleepier than normal, vomited x1 and continued to appear nauseous. Exam [place] [facility]: "Awakens, tracks/follows examiner with eyes, moving all 4 extremities, lifting head off bed." Patient arrived to [facility] intubated and sedated on propofol 10/min.    PAST MEDICAL & SURGICAL HISTORY:  Seizures    FAMILY HISTORY:  Unable to obtain    SOCIAL HISTORY:  Domiciled in group home    Allergies  Allergy Status Unknown    REVIEW OF SYSTEMS  Unable to obtain      MEDICATIONS:  Antibiotics:    Neuro:  fentaNYL    Injectable 100 MICROGram(s) IV Push once  fentaNYL   Infusion. 0.5 MICROgram(s)/kG/Hr IV Continuous <Continuous>  rocuronium Injectable 50 milliGRAM(s) IV Push once    Anticoagulation:    OTHER:    IVF:  sodium chloride 0.9% Bolus 1000 milliLiter(s) IV Bolus once  sodium chloride 0.9%. 1000 milliLiter(s) IV Continuous <Continuous>    Vital Signs Last 24 Hrs  T(C): --  T(F): --  HR: --  BP: --  BP(mean): --  RR: --  SpO2: --  BP at time of examination 87/54    [facility] [place]  BP 88/52 HR 62 RR 20 Pulse Ox 98% HISTORY OF PRESENT ILLNESS:       PAST MEDICAL & SURGICAL HISTORY:  Seizures    FAMILY HISTORY:  Unable to obtain    SOCIAL HISTORY:  Domiciled in group home    Allergies  Allergy Status Unknown    REVIEW OF SYSTEMS  Unable to obtain      MEDICATIONS:  Antibiotics:    Neuro:  fentaNYL    Injectable 100 MICROGram(s) IV Push once  fentaNYL   Infusion. 0.5 MICROgram(s)/kG/Hr IV Continuous <Continuous>  rocuronium Injectable 50 milliGRAM(s) IV Push once    Anticoagulation:    OTHER:    IVF:  sodium chloride 0.9% Bolus 1000 milliLiter(s) IV Bolus once  sodium chloride 0.9%. 1000 milliLiter(s) IV Continuous <Continuous>    Vital Signs Last 24 Hrs  T(C): --  T(F): --  HR: --  BP: --  BP(mean): --  RR: --  SpO2: --  BP at time of examination 87/54    [facility] vitals  BP 88/52 HR 62 RR 20 Pulse Ox 98% HISTORY OF PRESENT ILLNESS:   52 year old male [facility] glaucoma, seizure disorder, domiciled in group home in [place], last known well 21:30 last night when he went to bed, found this morning 06:15 down on the floor next to his bed on his right side, originally unresponsive to house staff who then placed him in his wheelchair, and then would intermittently speak and open eyes. [place] [facility] [place] and found with what was read as a left perifrontal lens-shaped hyperdense extraaxial collection consistent with EDH and patient was transferred to [facility]. Exam at [facility]- intermittently opened eyes but unable to sustain opening, intermittently spoke, moved all extremities purposefully but did not follow commands. Vomited x 1 at [facility] with reported hematemesis. Patient arrived to [facility] intubated, sedated on propofol. With propofol held for ~5 minutes, did not open eyes, did not follow commands, but was purposeful w/ all extremities to noxious stimuli.    GCS E- 1 V- 1T M- 5= 7T    At baseline he is verbal- very talkative, and can be combative/agitated especially at hospitals. Usually able to state his last name and that he lives in [place], able to converse in easy conversation, and follows simple commands to perform ADLS with assistance. Ambulatory with staff assistance and rolling walker, or uses wheelchair when staff assist unavailable.     [facility] vitals  BP 88/52 HR 62 RR 20 Pulse Ox 98%    PAST MEDICAL & SURGICAL HISTORY:  MR (mental retardation)  Glaucoma  R eye blindness  Seizures    FAMILY HISTORY:  No pertinent family history in first degree relatives    SOCIAL HISTORY:  Unable to obtain    Allergies  Allergy Status Unknown    REVIEW OF SYSTEMS   Unable to obtain    MEDICATIONS:  Neuro:  fentaNYL    Injectable 100 MICROGram(s) IV Push once    IVF:  sodium chloride 0.9%. 1000 milliLiter(s) IV Continuous <Continuous>    Vital Signs Last 24 Hrs  T(C): 33 (26 Jul 2018 11:50), Max: 33 (26 Jul 2018 11:50)  T(F): 91.4 (26 Jul 2018 11:50), Max: 91.4 (26 Jul 2018 11:50)  HR: 103 (26 Jul 2018 11:50) (98 - 103)  BP: 92/63 (26 Jul 2018 11:50) (92/63 - 92/63)  BP(mean): 70 (26 Jul 2018 11:50) (70 - 70)  RR: 14 (26 Jul 2018 11:50) (14 - 14)  SpO2: 100% (26 Jul 2018 11:50) (99% - 100%)    Vitals on exam  BP 87/54 HR 98 RR 13 SpO2 98%    PHYSICAL EXAM:  GCS: E- 1 V- 1T M- 5= 7T  GENERAL: NAD, thin  HEAD:  Atraumatic, normocephalic  EYES: Conjunctiva and sclera clear  NECK: C collar in place  MENTAL STATUS: Propofol held ~ 5 min, intubated. Does not open eyes. Does not follow commands   CRANIAL NERVES: R eye sunken/opacified, L pupil 3 mm, round, reactive; did not track examiner; no gross facial asymmetry; gag reflex intact  MOTOR: purposeful to noxious stimuli all extremities  SENSATION: unable to assess  BACK: no palpable step offs, no signs of trauma  CHEST/LUNG: Nonlabored breathing, no respiratory distress. Symmetric chest rise  HEART: +S1/+S2; Regular rate and rhythm  ABDOMEN: + G tube, clean, without erythema/drainage; soft, nontender, nondistended  EXTREMITIES: R medial knee ecchymosis, L foot drop  SKIN: Warm, dry; no rashes or lesions    LABS:                        11.0   7.3   )-----------( 83       ( 26 Jul 2018 11:02 )             34.1     07-26    140  |  106  |  24.0<H>  ----------------------------<  85  4.1   |  23.0  |  0.56    Ca    8.4<L>      26 Jul 2018 11:02    TPro  6.3<L>  /  Alb  3.2<L>  /  TBili  0.5  /  DBili  x   /  AST  45<H>  /  ALT  48<H>  /  AlkPhos  106  07-26    PT/INR - ( 26 Jul 2018 11:02 )   PT: 12.6 sec;   INR: 1.14 ratio    PTT - ( 26 Jul 2018 11:02 )  PTT:40.5 sec    RADIOLOGY & ADDITIONAL STUDIES:  - CT head final read pending, reviewed w/ Dr. Herrera- ABIMBOLA frontal SDH extending to falx without significant change compared to [facility] CT head    - CT head [facility]  Findings: There is no midline shift. The ventricular system and cortical sulci are normal in appearance. 11 mm (trans) 'lens-shaped' left perifrontal hyperdense extraaxial collection is consistent with an epidural hematoma. Adjacent minute subdural hemorrhage is also identified. No evidence of midline shift. No displaced skull fracture. Calcification and atrophy of the right globe is unaltered.  Impression: 1. Left parafrontal epidural hematoma and minimal subdural hematoma

## 2018-07-26 NOTE — ED ADULT TRIAGE NOTE - CHIEF COMPLAINT QUOTE
pt BIBA from [facility], trauma transfer, intubated on vent, code team called to trauma upon pt arrival.

## 2018-07-26 NOTE — PROGRESS NOTE ADULT - ASSESSMENT
2 year old male PMH glaucoma, seizure disorder,MR, found down on floor next to bed on right side,  GCS 7T found to have left frontal SDH and falx SDH with L->R midline shift    Pt seen by me on behalf of ACS/Trauma surgery team. Remainder of care per SICU team, including:    - Pt in SICU, remains intubated, ventilator support  - Neurosurgery Dr. Herrera on board  - Repeat CT head 5PM  - minimizing sedation as much as possible to obtain accurate serial neurologic examinations  - Further imaging/plan pending serial neuro exam findings  - Q1 neuro checks, HOB 30 degrees  - SBP goal < 150  - Hold antiplatelets/anticoagulation for now  - SCDs for DVT ppx

## 2018-07-26 NOTE — ED PROVIDER NOTE - OBJECTIVE STATEMENT
52yom w/ seizures, MR, agitated and non-verbal at baseline transferred for trauma evaluation. Pt was found on floor at group home after an unwitnessed fall and brought to [facility] where he was found to have a left parafrontal epidural and a minimal subdural hematoma with no midline shift. He was intubated for airway protection with etomidate and succinylcholine, and sedation maintained w/ propofol. CT of the c-spine was unremarkable.

## 2018-07-26 NOTE — H&P ADULT - ATTENDING COMMENTS
TRAUMA TEAM ACTIVATION    The patient was seen and examined  Details per the resident's H&P  This is a 52-year old man who presented to an OSH after being found down  CT there revealed ICH  The patient was intubated for transport to [facility]    Airway:  ETT  Bilateral breath sounds  Hemodynamically okay  GCS=6-7T, pupils are equal and reactive  No obvious external injury    CXR:  No PTX/RANDELL    Exam:  T=90 F  Neurologic:  GCS=6-7T    CT images reviewed    Impression:  S/P found down  ICH  Hypothermia  Atrial fibrillation    Plan:  Admit SICU  Neurosurgical consult  Active rewarming  TFTs  DVT prophylaxis

## 2018-07-26 NOTE — ED ADULT NURSE NOTE - CHIEF COMPLAINT QUOTE
pt BIBA from [facility], trauma transfer, intubated on vent, code team called to trauma upon pt arrival.

## 2018-07-26 NOTE — ED PROVIDER NOTE - SKIN, MLM
[facility]    [address] [address]    Phone:  [phone]       Thank You for choosing us for your health care visit. We are glad to serve you and happy to provide you with this summary of your visit. Please help us to ensure we have accurate records. If you find anything that needs to be changed, please let our staff know as soon as possible.          Your Demographic Information     Patient Name Sex     Juanjose Mccurdy Male 1967       Ethnic Group Patient Race    Not of  or  Origin White      Your Visit Details     Date & Time Provider Department    2017 8:30 AM JVUE [facility]      Your Upcoming Appointment*(Max 10)     2017 10:15 AM CDT   Worker's Comp Follow Up Visit with Leandro Leblanc MD   [facility] ([facility])    [address] [id number] [phone]            2017  7:00 AM CST   Fasting Lab with OSW LAB   [facility] Laboratory ([facility])    [address] [id number] [phone]            2017  1:30 PM CST   Complete Physical Exam with Izabel Man MD   [facility] Internal Medicine [address] ([facility])    [address] [address]   [phone]              Your To Do List     Follow-Up    Return in about 1 month (around 2017).      Conditions Discussed Today or Order-Related Diagnoses        Comments    Laceration of left thumb, subsequent encounter    -  Primary       Your Vitals Were     BP Pulse Temp Resp Smoking Status       116/86 74 97.4 °F (36.3 °C) (Oral) 14 Never Smoker       Medications Prescribed or Re-Ordered Today     None      Your Current Medications Are        Disp Refills Start End    ibuprofen (MOTRIN) 800 MG tablet 30  tablet 1 5/19/2017     Sig - Route: Take 1 tablet by mouth every 8 hours as needed for Pain. Take with food.  Fill as workman's comp - Oral    Class: Eprescribe    bacitracin 500 UNIT/GM ointment 15 g 0 5/18/2017     Sig: Apply 1 to 3 times daily for one week.    lisinopril (ZESTRIL) 20 MG tablet 90 tablet 3 11/16/2016     Sig - Route: Take 1 tablet by mouth daily. - Oral    Class: Eprescribe    amLODIPine (NORVASC) 5 MG tablet 30 tablet 11 11/16/2016     Sig - Route: Take 1 tablet by mouth daily. - Oral    Class: Eprescribe    simvastatin (ZOCOR) 20 MG tablet 30 tablet 11 11/16/2016     Sig - Route: Take 1 tablet by mouth nightly. - Oral    Class: Eprescribe    indomethacin (INDOCIN) 50 MG capsule 60 capsule 1 5/5/2016     Sig - Route: Take 1 capsule by mouth 2 times daily (with meals). - Oral    Class: Eprescribe    Cosign for Ordering: Accepted by JUVE Tan on 5/5/2016  5:25 PM    Multiple Vitamins-Minerals (MULTIVITAMIN PO)        Sig - Route: Take 1 tablet by mouth daily. - Oral    Class: Historical Med      Allergies     No Known Allergies      Immunizations History as of 5/30/2017     Name Date    DPT 11/12/1970, 2/13/1967, 1967, 1967, 1967    Hepatitis B Adult 12/8/1999, 6/22/1999, 5/19/1999    Measles 8/12/1966    Polio, ORAL 11/12/1970, 2/13/1967, 1965, 1965, 1965    Rubella 6/30/1970    Tdap 6/29/2012      Problem List as of 5/30/2017     Hyperlipidemia LDL goal <130    Benign essential HTN    IFG (impaired fasting glucose)    Obesity (BMI 30-39.9)    Acute gouty arthritis    Laceration of left thumb            Patient Instructions     None       Skin normal color for race, warm, dry and intact.

## 2018-07-27 DIAGNOSIS — H40.9 UNSPECIFIED GLAUCOMA: ICD-10-CM

## 2018-07-27 DIAGNOSIS — R56.9 UNSPECIFIED CONVULSIONS: ICD-10-CM

## 2018-07-27 DIAGNOSIS — F79 UNSPECIFIED INTELLECTUAL DISABILITIES: ICD-10-CM

## 2018-07-27 LAB
ANION GAP SERPL CALC-SCNC: 10 MMOL/L — SIGNIFICANT CHANGE UP (ref 5–17)
ANION GAP SERPL CALC-SCNC: 9 MMOL/L — SIGNIFICANT CHANGE UP (ref 5–17)
BASOPHILS # BLD AUTO: 0 K/UL — SIGNIFICANT CHANGE UP (ref 0–0.2)
BASOPHILS NFR BLD AUTO: 0.2 % — SIGNIFICANT CHANGE UP (ref 0–2)
BUN SERPL-MCNC: 18 MG/DL — SIGNIFICANT CHANGE UP (ref 8–20)
BUN SERPL-MCNC: 19 MG/DL — SIGNIFICANT CHANGE UP (ref 8–20)
CALCIUM SERPL-MCNC: 7.3 MG/DL — LOW (ref 8.6–10.2)
CALCIUM SERPL-MCNC: 7.5 MG/DL — LOW (ref 8.6–10.2)
CHLORIDE SERPL-SCNC: 114 MMOL/L — HIGH (ref 98–107)
CHLORIDE SERPL-SCNC: 115 MMOL/L — HIGH (ref 98–107)
CO2 SERPL-SCNC: 18 MMOL/L — LOW (ref 22–29)
CO2 SERPL-SCNC: 19 MMOL/L — LOW (ref 22–29)
CREAT SERPL-MCNC: 0.72 MG/DL — SIGNIFICANT CHANGE UP (ref 0.5–1.3)
CREAT SERPL-MCNC: 0.75 MG/DL — SIGNIFICANT CHANGE UP (ref 0.5–1.3)
EOSINOPHIL # BLD AUTO: 0 K/UL — SIGNIFICANT CHANGE UP (ref 0–0.5)
EOSINOPHIL NFR BLD AUTO: 0.2 % — SIGNIFICANT CHANGE UP (ref 0–5)
GAS PNL BLDA: SIGNIFICANT CHANGE UP
GLUCOSE BLDC GLUCOMTR-MCNC: 110 MG/DL — HIGH (ref 70–99)
GLUCOSE BLDC GLUCOMTR-MCNC: 142 MG/DL — HIGH (ref 70–99)
GLUCOSE BLDC GLUCOMTR-MCNC: 154 MG/DL — HIGH (ref 70–99)
GLUCOSE BLDC GLUCOMTR-MCNC: 88 MG/DL — SIGNIFICANT CHANGE UP (ref 70–99)
GLUCOSE SERPL-MCNC: 127 MG/DL — HIGH (ref 70–115)
GLUCOSE SERPL-MCNC: 142 MG/DL — HIGH (ref 70–115)
HBA1C BLD-MCNC: 4.5 % — SIGNIFICANT CHANGE UP (ref 4–5.6)
HCT VFR BLD CALC: 34.5 % — LOW (ref 42–52)
HGB BLD-MCNC: 11.2 G/DL — LOW (ref 14–18)
LYMPHOCYTES # BLD AUTO: 1.6 K/UL — SIGNIFICANT CHANGE UP (ref 1–4.8)
LYMPHOCYTES # BLD AUTO: 12.4 % — LOW (ref 20–55)
MAGNESIUM SERPL-MCNC: 2.3 MG/DL — SIGNIFICANT CHANGE UP (ref 1.6–2.6)
MAGNESIUM SERPL-MCNC: 2.5 MG/DL — SIGNIFICANT CHANGE UP (ref 1.6–2.6)
MCHC RBC-ENTMCNC: 29.6 PG — SIGNIFICANT CHANGE UP (ref 27–31)
MCHC RBC-ENTMCNC: 32.5 G/DL — SIGNIFICANT CHANGE UP (ref 32–36)
MCV RBC AUTO: 91 FL — SIGNIFICANT CHANGE UP (ref 80–94)
MONOCYTES # BLD AUTO: 0.9 K/UL — HIGH (ref 0–0.8)
MONOCYTES NFR BLD AUTO: 7 % — SIGNIFICANT CHANGE UP (ref 3–10)
NEUTROPHILS # BLD AUTO: 10.1 K/UL — HIGH (ref 1.8–8)
NEUTROPHILS NFR BLD AUTO: 79.6 % — HIGH (ref 37–73)
PHOSPHATE SERPL-MCNC: 2.8 MG/DL — SIGNIFICANT CHANGE UP (ref 2.4–4.7)
PHOSPHATE SERPL-MCNC: 3.6 MG/DL — SIGNIFICANT CHANGE UP (ref 2.4–4.7)
PLATELET # BLD AUTO: 122 K/UL — LOW (ref 150–400)
POTASSIUM SERPL-MCNC: 4.1 MMOL/L — SIGNIFICANT CHANGE UP (ref 3.5–5.3)
POTASSIUM SERPL-MCNC: 4.4 MMOL/L — SIGNIFICANT CHANGE UP (ref 3.5–5.3)
POTASSIUM SERPL-SCNC: 4.1 MMOL/L — SIGNIFICANT CHANGE UP (ref 3.5–5.3)
POTASSIUM SERPL-SCNC: 4.4 MMOL/L — SIGNIFICANT CHANGE UP (ref 3.5–5.3)
RBC # BLD: 3.79 M/UL — LOW (ref 4.6–6.2)
RBC # FLD: 16.7 % — HIGH (ref 11–15.6)
SODIUM SERPL-SCNC: 142 MMOL/L — SIGNIFICANT CHANGE UP (ref 135–145)
SODIUM SERPL-SCNC: 143 MMOL/L — SIGNIFICANT CHANGE UP (ref 135–145)
WBC # BLD: 12.7 K/UL — HIGH (ref 4.8–10.8)
WBC # FLD AUTO: 12.7 K/UL — HIGH (ref 4.8–10.8)

## 2018-07-27 PROCEDURE — 99232 SBSQ HOSP IP/OBS MODERATE 35: CPT

## 2018-07-27 PROCEDURE — 70450 CT HEAD/BRAIN W/O DYE: CPT | Mod: 26

## 2018-07-27 RX ORDER — IPRATROPIUM/ALBUTEROL SULFATE 18-103MCG
3 AEROSOL WITH ADAPTER (GRAM) INHALATION EVERY 6 HOURS
Qty: 0 | Refills: 0 | Status: DISCONTINUED | OUTPATIENT
Start: 2018-07-27 | End: 2018-08-04

## 2018-07-27 RX ORDER — FENTANYL CITRATE 50 UG/ML
0.5 INJECTION INTRAVENOUS
Qty: 2500 | Refills: 0 | Status: DISCONTINUED | OUTPATIENT
Start: 2018-07-27 | End: 2018-07-27

## 2018-07-27 RX ORDER — HYDROMORPHONE HYDROCHLORIDE 2 MG/ML
0.5 INJECTION INTRAMUSCULAR; INTRAVENOUS; SUBCUTANEOUS EVERY 4 HOURS
Qty: 0 | Refills: 0 | Status: DISCONTINUED | OUTPATIENT
Start: 2018-07-27 | End: 2018-08-03

## 2018-07-27 RX ORDER — ACETAMINOPHEN 500 MG
1000 TABLET ORAL ONCE
Qty: 0 | Refills: 0 | Status: COMPLETED | OUTPATIENT
Start: 2018-07-27 | End: 2018-07-27

## 2018-07-27 RX ORDER — KETOROLAC TROMETHAMINE 30 MG/ML
15 SYRINGE (ML) INJECTION EVERY 6 HOURS
Qty: 0 | Refills: 0 | Status: DISCONTINUED | OUTPATIENT
Start: 2018-07-27 | End: 2018-07-28

## 2018-07-27 RX ORDER — AMIODARONE HYDROCHLORIDE 400 MG/1
200 TABLET ORAL DAILY
Qty: 0 | Refills: 0 | Status: DISCONTINUED | OUTPATIENT
Start: 2018-07-28 | End: 2018-07-30

## 2018-07-27 RX ORDER — ACETAMINOPHEN 500 MG
1000 TABLET ORAL ONCE
Qty: 0 | Refills: 0 | Status: COMPLETED | OUTPATIENT
Start: 2018-07-28 | End: 2018-07-28

## 2018-07-27 RX ORDER — TIOTROPIUM BROMIDE 18 UG/1
1 CAPSULE ORAL; RESPIRATORY (INHALATION) DAILY
Qty: 0 | Refills: 0 | Status: DISCONTINUED | OUTPATIENT
Start: 2018-07-27 | End: 2018-08-06

## 2018-07-27 RX ADMIN — PANTOPRAZOLE SODIUM 40 MILLIGRAM(S): 20 TABLET, DELAYED RELEASE ORAL at 11:07

## 2018-07-27 RX ADMIN — Medication 15 MILLIGRAM(S): at 18:44

## 2018-07-27 RX ADMIN — Medication 1: at 06:26

## 2018-07-27 RX ADMIN — CHLORHEXIDINE GLUCONATE 1 APPLICATION(S): 213 SOLUTION TOPICAL at 11:07

## 2018-07-27 RX ADMIN — CHLORHEXIDINE GLUCONATE 15 MILLILITER(S): 213 SOLUTION TOPICAL at 06:28

## 2018-07-27 RX ADMIN — Medication 400 MILLIGRAM(S): at 19:25

## 2018-07-27 RX ADMIN — Medication 5.84 MICROGRAM(S)/KG/MIN: at 00:29

## 2018-07-27 RX ADMIN — Medication 500 MILLIGRAM(S): at 17:02

## 2018-07-27 RX ADMIN — PIPERACILLIN AND TAZOBACTAM 25 GRAM(S): 4; .5 INJECTION, POWDER, LYOPHILIZED, FOR SOLUTION INTRAVENOUS at 07:32

## 2018-07-27 RX ADMIN — CHLORHEXIDINE GLUCONATE 15 MILLILITER(S): 213 SOLUTION TOPICAL at 17:02

## 2018-07-27 RX ADMIN — Medication 1000 MILLIGRAM(S): at 20:00

## 2018-07-27 RX ADMIN — FENTANYL CITRATE 3.12 MICROGRAM(S)/KG/HR: 50 INJECTION INTRAVENOUS at 10:59

## 2018-07-27 RX ADMIN — PIPERACILLIN AND TAZOBACTAM 25 GRAM(S): 4; .5 INJECTION, POWDER, LYOPHILIZED, FOR SOLUTION INTRAVENOUS at 15:04

## 2018-07-27 RX ADMIN — Medication 15 MILLIGRAM(S): at 18:29

## 2018-07-27 RX ADMIN — SODIUM CHLORIDE 75 MILLILITER(S): 9 INJECTION INTRAMUSCULAR; INTRAVENOUS; SUBCUTANEOUS at 06:28

## 2018-07-27 RX ADMIN — PIPERACILLIN AND TAZOBACTAM 25 GRAM(S): 4; .5 INJECTION, POWDER, LYOPHILIZED, FOR SOLUTION INTRAVENOUS at 00:29

## 2018-07-27 RX ADMIN — Medication 3 MILLILITER(S): at 20:25

## 2018-07-27 RX ADMIN — Medication 500 MILLIGRAM(S): at 06:26

## 2018-07-27 RX ADMIN — SODIUM CHLORIDE 50 MILLILITER(S): 9 INJECTION INTRAMUSCULAR; INTRAVENOUS; SUBCUTANEOUS at 09:23

## 2018-07-27 NOTE — PROGRESS NOTE ADULT - ASSESSMENT
52y Male found down with Left subdural hematoma and interval development of cerebral contusions-stable, new onset atrial fibrillation with hypotension s/p chemical cardioversion, shock septic vs likely cardiogenic  Neuro: Wean sedation, serial neuroexams, no role for ICP monitoring per Nsx at this time given improving exam, Cont Valproic acid, monitor levels  Pulm: Wean CPAP, plan to extubate  CV: Wean pressor requirements, maintain MAP>65, cont amiodarone gtt, start PO, echo with some RV dilatation, no LV dysfunction  GI: Feed when off pressors  : Monitor UOP  Endo: C/W ISS, FS  Heme/DVT: SCDs  ID: Monitor Leukocytosis/fevers  TubeS: Monitor UOP  Lines: R SC, PIV  Dispo: Cont SICU care, wean to extubate today

## 2018-07-27 NOTE — DIETITIAN INITIAL EVALUATION ADULT. - PERTINENT LABORATORY DATA
07-27 Na143 mmol/L Glu 127 mg/dL<H> K+ 4.4 mmol/L Cr  0.75 mg/dL BUN 18.0 mg/dL Phos 3.6 mg/dL Alb n/a   PAB n/a

## 2018-07-27 NOTE — PROGRESS NOTE ADULT - ASSESSMENT
52 year old male PMH glaucoma, seizure disorder, mental retardation, last known well 21:30 7/25/18 found down on floor next to bed on right side 06:15 AM 7/26/18 found with left frontal SDH and falx SDH.  -Repeat HCT reviewed with Dr. Herrera - stable    PLAN:  - D/w Dr. Herrera  - Repeat CT head in morning prior to lovenox for DVT ppx, - SCDs for DVT ppx in the interim  - Recommend minimizing sedation as much as possible to obtain accurate serial neurologic examinations  - Q2 neuro checks, HOB 30 degrees  - SBP goal <150  - Hold antiplatelets/anticoagulation for now  - Exam continues to improve -appears close to baseline. No indication for ICP monitoring at this time

## 2018-07-28 LAB
ANION GAP SERPL CALC-SCNC: 9 MMOL/L — SIGNIFICANT CHANGE UP (ref 5–17)
BASOPHILS # BLD AUTO: 0 K/UL — SIGNIFICANT CHANGE UP (ref 0–0.2)
BASOPHILS NFR BLD AUTO: 0.2 % — SIGNIFICANT CHANGE UP (ref 0–2)
BUN SERPL-MCNC: 14 MG/DL — SIGNIFICANT CHANGE UP (ref 8–20)
CALCIUM SERPL-MCNC: 7.9 MG/DL — LOW (ref 8.6–10.2)
CHLORIDE SERPL-SCNC: 112 MMOL/L — HIGH (ref 98–107)
CO2 SERPL-SCNC: 23 MMOL/L — SIGNIFICANT CHANGE UP (ref 22–29)
CREAT SERPL-MCNC: 0.62 MG/DL — SIGNIFICANT CHANGE UP (ref 0.5–1.3)
EOSINOPHIL # BLD AUTO: 0 K/UL — SIGNIFICANT CHANGE UP (ref 0–0.5)
EOSINOPHIL NFR BLD AUTO: 0.5 % — SIGNIFICANT CHANGE UP (ref 0–5)
GAS PNL BLDA: SIGNIFICANT CHANGE UP
GLUCOSE BLDC GLUCOMTR-MCNC: 111 MG/DL — HIGH (ref 70–99)
GLUCOSE BLDC GLUCOMTR-MCNC: 112 MG/DL — HIGH (ref 70–99)
GLUCOSE BLDC GLUCOMTR-MCNC: 87 MG/DL — SIGNIFICANT CHANGE UP (ref 70–99)
GLUCOSE SERPL-MCNC: 97 MG/DL — SIGNIFICANT CHANGE UP (ref 70–115)
HCT VFR BLD CALC: 26.8 % — LOW (ref 42–52)
HGB BLD-MCNC: 8.9 G/DL — LOW (ref 14–18)
LYMPHOCYTES # BLD AUTO: 0.9 K/UL — LOW (ref 1–4.8)
LYMPHOCYTES # BLD AUTO: 21.4 % — SIGNIFICANT CHANGE UP (ref 20–55)
MAGNESIUM SERPL-MCNC: 2 MG/DL — SIGNIFICANT CHANGE UP (ref 1.6–2.6)
MCHC RBC-ENTMCNC: 30.5 PG — SIGNIFICANT CHANGE UP (ref 27–31)
MCHC RBC-ENTMCNC: 33.2 G/DL — SIGNIFICANT CHANGE UP (ref 32–36)
MCV RBC AUTO: 91.8 FL — SIGNIFICANT CHANGE UP (ref 80–94)
MONOCYTES # BLD AUTO: 0.4 K/UL — SIGNIFICANT CHANGE UP (ref 0–0.8)
MONOCYTES NFR BLD AUTO: 8.8 % — SIGNIFICANT CHANGE UP (ref 3–10)
NEUTROPHILS # BLD AUTO: 2.9 K/UL — SIGNIFICANT CHANGE UP (ref 1.8–8)
NEUTROPHILS NFR BLD AUTO: 68.4 % — SIGNIFICANT CHANGE UP (ref 37–73)
PHOSPHATE SERPL-MCNC: 2.3 MG/DL — LOW (ref 2.4–4.7)
PLATELET # BLD AUTO: 81 K/UL — LOW (ref 150–400)
POTASSIUM SERPL-MCNC: 3.7 MMOL/L — SIGNIFICANT CHANGE UP (ref 3.5–5.3)
POTASSIUM SERPL-SCNC: 3.7 MMOL/L — SIGNIFICANT CHANGE UP (ref 3.5–5.3)
RBC # BLD: 2.92 M/UL — LOW (ref 4.6–6.2)
RBC # FLD: 17.3 % — HIGH (ref 11–15.6)
SODIUM SERPL-SCNC: 144 MMOL/L — SIGNIFICANT CHANGE UP (ref 135–145)
WBC # BLD: 4.2 K/UL — LOW (ref 4.8–10.8)
WBC # FLD AUTO: 4.2 K/UL — LOW (ref 4.8–10.8)

## 2018-07-28 PROCEDURE — 99232 SBSQ HOSP IP/OBS MODERATE 35: CPT

## 2018-07-28 PROCEDURE — 70450 CT HEAD/BRAIN W/O DYE: CPT | Mod: 26

## 2018-07-28 RX ORDER — POTASSIUM PHOSPHATE, MONOBASIC POTASSIUM PHOSPHATE, DIBASIC 236; 224 MG/ML; MG/ML
15 INJECTION, SOLUTION INTRAVENOUS ONCE
Qty: 0 | Refills: 0 | Status: COMPLETED | OUTPATIENT
Start: 2018-07-28 | End: 2018-07-28

## 2018-07-28 RX ORDER — TAMSULOSIN HYDROCHLORIDE 0.4 MG/1
0.4 CAPSULE ORAL AT BEDTIME
Qty: 0 | Refills: 0 | Status: DISCONTINUED | OUTPATIENT
Start: 2018-07-28 | End: 2018-08-06

## 2018-07-28 RX ORDER — HALOPERIDOL DECANOATE 100 MG/ML
5 INJECTION INTRAMUSCULAR ONCE
Qty: 0 | Refills: 0 | Status: COMPLETED | OUTPATIENT
Start: 2018-07-28 | End: 2018-07-28

## 2018-07-28 RX ADMIN — HALOPERIDOL DECANOATE 5 MILLIGRAM(S): 100 INJECTION INTRAMUSCULAR at 11:22

## 2018-07-28 RX ADMIN — HYDROMORPHONE HYDROCHLORIDE 0.5 MILLIGRAM(S): 2 INJECTION INTRAMUSCULAR; INTRAVENOUS; SUBCUTANEOUS at 09:45

## 2018-07-28 RX ADMIN — SODIUM CHLORIDE 50 MILLILITER(S): 9 INJECTION INTRAMUSCULAR; INTRAVENOUS; SUBCUTANEOUS at 18:59

## 2018-07-28 RX ADMIN — Medication 400 MILLIGRAM(S): at 02:12

## 2018-07-28 RX ADMIN — Medication 15 MILLIGRAM(S): at 05:43

## 2018-07-28 RX ADMIN — HYDROMORPHONE HYDROCHLORIDE 0.5 MILLIGRAM(S): 2 INJECTION INTRAMUSCULAR; INTRAVENOUS; SUBCUTANEOUS at 09:30

## 2018-07-28 RX ADMIN — Medication 3 MILLILITER(S): at 08:29

## 2018-07-28 RX ADMIN — Medication 15 MILLIGRAM(S): at 00:21

## 2018-07-28 RX ADMIN — Medication 1000 MILLIGRAM(S): at 11:17

## 2018-07-28 RX ADMIN — PIPERACILLIN AND TAZOBACTAM 25 GRAM(S): 4; .5 INJECTION, POWDER, LYOPHILIZED, FOR SOLUTION INTRAVENOUS at 18:10

## 2018-07-28 RX ADMIN — HALOPERIDOL DECANOATE 5 MILLIGRAM(S): 100 INJECTION INTRAMUSCULAR at 11:45

## 2018-07-28 RX ADMIN — PIPERACILLIN AND TAZOBACTAM 25 GRAM(S): 4; .5 INJECTION, POWDER, LYOPHILIZED, FOR SOLUTION INTRAVENOUS at 07:52

## 2018-07-28 RX ADMIN — POTASSIUM PHOSPHATE, MONOBASIC POTASSIUM PHOSPHATE, DIBASIC 62.5 MILLIMOLE(S): 236; 224 INJECTION, SOLUTION INTRAVENOUS at 06:50

## 2018-07-28 RX ADMIN — Medication 3 MILLILITER(S): at 14:53

## 2018-07-28 RX ADMIN — CHLORHEXIDINE GLUCONATE 1 APPLICATION(S): 213 SOLUTION TOPICAL at 12:15

## 2018-07-28 RX ADMIN — Medication 15 MILLIGRAM(S): at 05:49

## 2018-07-28 RX ADMIN — Medication 500 MILLIGRAM(S): at 18:11

## 2018-07-28 RX ADMIN — Medication 3 MILLILITER(S): at 03:48

## 2018-07-28 RX ADMIN — PIPERACILLIN AND TAZOBACTAM 25 GRAM(S): 4; .5 INJECTION, POWDER, LYOPHILIZED, FOR SOLUTION INTRAVENOUS at 00:20

## 2018-07-28 RX ADMIN — Medication 15 MILLIGRAM(S): at 00:20

## 2018-07-28 RX ADMIN — Medication 400 MILLIGRAM(S): at 10:47

## 2018-07-28 RX ADMIN — AMIODARONE HYDROCHLORIDE 200 MILLIGRAM(S): 400 TABLET ORAL at 05:43

## 2018-07-28 RX ADMIN — Medication 1000 MILLIGRAM(S): at 02:12

## 2018-07-28 RX ADMIN — Medication 3 MILLILITER(S): at 21:46

## 2018-07-28 RX ADMIN — Medication 500 MILLIGRAM(S): at 05:43

## 2018-07-28 NOTE — PROGRESS NOTE ADULT - ASSESSMENT
Assessment and Plan:    52y Male found down with Left subdural hematoma and interval development of cerebral contusions-stable, new onset atrial fibrillation with hypotension s/p chemical cardioversion, shock septic vs cardiogenic    Neuro: GCS 15. Repeat CT stable.  Monitor for delirium.  Continue to optimize pain control. Serial Neurologic assessments    HEENT: no issues    CV: Continue hemodynamic monitoring    Pulm: Pulmonary toilet.  Continue incentive spirometer.  Chest PT.  Encourage OOB to chair and ambulation     GI/Nutrition: Bowel Regimen    /Renal: monitor UOP. Monitor BMP.  Replete Lytes as needed      HEME- DVT prophylaxis, SCDs    ID: Zosyn for UTI    Lines/Tubes:  PEG    Endo: ISS    Dispo:  Floor transfer on 1:1.

## 2018-07-28 NOTE — PROGRESS NOTE ADULT - ASSESSMENT
52 year old male PMH glaucoma, seizure disorder, mental retardation, last known well 21:30 7/25/18 found down on floor next to bed on right side 06:15 AM 7/26/18 found with left frontal SDH and falx SDH.  -Repeat HCT reviewed with Dr. Herrera - stable  -7/27 - extubated  -7/28 - HCT improved      PLAN:  - D/w Dr. Herrera  - Repeat CT head improved - OK to start lovenox for DVT ppx tomorrow  - SBP goal <150  - Exam continues to improve -appears close to baseline

## 2018-07-29 LAB
ANION GAP SERPL CALC-SCNC: 10 MMOL/L — SIGNIFICANT CHANGE UP (ref 5–17)
ANISOCYTOSIS BLD QL: SLIGHT — SIGNIFICANT CHANGE UP
BASOPHILS # BLD AUTO: 0 K/UL — SIGNIFICANT CHANGE UP (ref 0–0.2)
BASOPHILS NFR BLD AUTO: 0 % — SIGNIFICANT CHANGE UP (ref 0–2)
BUN SERPL-MCNC: 18 MG/DL — SIGNIFICANT CHANGE UP (ref 8–20)
CALCIUM SERPL-MCNC: 8.4 MG/DL — LOW (ref 8.6–10.2)
CHLORIDE SERPL-SCNC: 114 MMOL/L — HIGH (ref 98–107)
CO2 SERPL-SCNC: 27 MMOL/L — SIGNIFICANT CHANGE UP (ref 22–29)
CREAT SERPL-MCNC: 0.72 MG/DL — SIGNIFICANT CHANGE UP (ref 0.5–1.3)
EOSINOPHIL # BLD AUTO: 0 K/UL — SIGNIFICANT CHANGE UP (ref 0–0.5)
EOSINOPHIL NFR BLD AUTO: 2 % — SIGNIFICANT CHANGE UP (ref 0–6)
GLUCOSE BLDC GLUCOMTR-MCNC: 120 MG/DL — HIGH (ref 70–99)
GLUCOSE BLDC GLUCOMTR-MCNC: 125 MG/DL — HIGH (ref 70–99)
GLUCOSE SERPL-MCNC: 92 MG/DL — SIGNIFICANT CHANGE UP (ref 70–115)
HCT VFR BLD CALC: 27.9 % — LOW (ref 42–52)
HGB BLD-MCNC: 8.8 G/DL — LOW (ref 14–18)
LYMPHOCYTES # BLD AUTO: 1 K/UL — SIGNIFICANT CHANGE UP (ref 1–4.8)
LYMPHOCYTES # BLD AUTO: 11 % — LOW (ref 20–55)
MACROCYTES BLD QL: SLIGHT — SIGNIFICANT CHANGE UP
MAGNESIUM SERPL-MCNC: 1.9 MG/DL — SIGNIFICANT CHANGE UP (ref 1.8–2.6)
MCHC RBC-ENTMCNC: 29.5 PG — SIGNIFICANT CHANGE UP (ref 27–31)
MCHC RBC-ENTMCNC: 31.5 G/DL — LOW (ref 32–36)
MCV RBC AUTO: 93.6 FL — SIGNIFICANT CHANGE UP (ref 80–94)
MICROCYTES BLD QL: SLIGHT — SIGNIFICANT CHANGE UP
MONOCYTES # BLD AUTO: 0.6 K/UL — SIGNIFICANT CHANGE UP (ref 0–0.8)
MONOCYTES NFR BLD AUTO: 1 % — LOW (ref 3–10)
NEUTROPHILS # BLD AUTO: 5.7 K/UL — SIGNIFICANT CHANGE UP (ref 1.8–8)
NEUTROPHILS NFR BLD AUTO: 84 % — HIGH (ref 37–73)
NEUTS BAND # BLD: 2 % — SIGNIFICANT CHANGE UP (ref 0–8)
PHOSPHATE SERPL-MCNC: 3 MG/DL — SIGNIFICANT CHANGE UP (ref 2.4–4.7)
PLAT MORPH BLD: NORMAL — SIGNIFICANT CHANGE UP
PLATELET # BLD AUTO: 77 K/UL — LOW (ref 150–400)
POLYCHROMASIA BLD QL SMEAR: SLIGHT — SIGNIFICANT CHANGE UP
POTASSIUM SERPL-MCNC: 4.4 MMOL/L — SIGNIFICANT CHANGE UP (ref 3.5–5.3)
POTASSIUM SERPL-SCNC: 4.4 MMOL/L — SIGNIFICANT CHANGE UP (ref 3.5–5.3)
RBC # BLD: 2.98 M/UL — LOW (ref 4.6–6.2)
RBC # FLD: 17.3 % — HIGH (ref 11–15.6)
RBC BLD AUTO: ABNORMAL
SODIUM SERPL-SCNC: 151 MMOL/L — HIGH (ref 135–145)
WBC # BLD: 6.8 K/UL — SIGNIFICANT CHANGE UP (ref 4.8–10.8)
WBC # FLD AUTO: 6.8 K/UL — SIGNIFICANT CHANGE UP (ref 4.8–10.8)

## 2018-07-29 RX ORDER — ENOXAPARIN SODIUM 100 MG/ML
30 INJECTION SUBCUTANEOUS EVERY 12 HOURS
Qty: 0 | Refills: 0 | Status: DISCONTINUED | OUTPATIENT
Start: 2018-07-29 | End: 2018-08-06

## 2018-07-29 RX ORDER — SODIUM CHLORIDE 9 MG/ML
1000 INJECTION, SOLUTION INTRAVENOUS
Qty: 0 | Refills: 0 | Status: DISCONTINUED | OUTPATIENT
Start: 2018-07-29 | End: 2018-07-30

## 2018-07-29 RX ADMIN — PIPERACILLIN AND TAZOBACTAM 25 GRAM(S): 4; .5 INJECTION, POWDER, LYOPHILIZED, FOR SOLUTION INTRAVENOUS at 16:26

## 2018-07-29 RX ADMIN — SODIUM CHLORIDE 50 MILLILITER(S): 9 INJECTION INTRAMUSCULAR; INTRAVENOUS; SUBCUTANEOUS at 00:05

## 2018-07-29 RX ADMIN — PIPERACILLIN AND TAZOBACTAM 25 GRAM(S): 4; .5 INJECTION, POWDER, LYOPHILIZED, FOR SOLUTION INTRAVENOUS at 00:04

## 2018-07-29 RX ADMIN — SODIUM CHLORIDE 50 MILLILITER(S): 9 INJECTION INTRAMUSCULAR; INTRAVENOUS; SUBCUTANEOUS at 05:18

## 2018-07-29 RX ADMIN — PIPERACILLIN AND TAZOBACTAM 25 GRAM(S): 4; .5 INJECTION, POWDER, LYOPHILIZED, FOR SOLUTION INTRAVENOUS at 23:43

## 2018-07-29 RX ADMIN — CHLORHEXIDINE GLUCONATE 1 APPLICATION(S): 213 SOLUTION TOPICAL at 11:22

## 2018-07-29 RX ADMIN — SODIUM CHLORIDE 50 MILLILITER(S): 9 INJECTION, SOLUTION INTRAVENOUS at 11:22

## 2018-07-29 RX ADMIN — Medication 500 MILLIGRAM(S): at 20:29

## 2018-07-29 RX ADMIN — ENOXAPARIN SODIUM 30 MILLIGRAM(S): 100 INJECTION SUBCUTANEOUS at 17:09

## 2018-07-29 RX ADMIN — Medication 3 MILLILITER(S): at 15:26

## 2018-07-29 RX ADMIN — Medication 500 MILLIGRAM(S): at 07:09

## 2018-07-29 RX ADMIN — PIPERACILLIN AND TAZOBACTAM 25 GRAM(S): 4; .5 INJECTION, POWDER, LYOPHILIZED, FOR SOLUTION INTRAVENOUS at 08:49

## 2018-07-29 RX ADMIN — Medication 3 MILLILITER(S): at 08:55

## 2018-07-29 RX ADMIN — TAMSULOSIN HYDROCHLORIDE 0.4 MILLIGRAM(S): 0.4 CAPSULE ORAL at 21:01

## 2018-07-29 RX ADMIN — AMIODARONE HYDROCHLORIDE 200 MILLIGRAM(S): 400 TABLET ORAL at 05:14

## 2018-07-29 NOTE — PROGRESS NOTE ADULT - ASSESSMENT
52-year-old male s/p fall with Left subdural hematoma and interval development of cerebral contusions. Hemodynamically stable.  -continue Zosyn therapy for complicated UTI for 5 days  -OOB and ambulating  -pain control 52-year-old male s/p fall with Left subdural hematoma and interval development of cerebral contusions. Hemodynamically stable.  -continue Zosyn therapy for complicated UTI for 4 days  -OOB and ambulating  -pain control

## 2018-07-30 LAB
ANION GAP SERPL CALC-SCNC: 12 MMOL/L — SIGNIFICANT CHANGE UP (ref 5–17)
BUN SERPL-MCNC: 17 MG/DL — SIGNIFICANT CHANGE UP (ref 8–20)
CALCIUM SERPL-MCNC: 8.5 MG/DL — LOW (ref 8.6–10.2)
CHLORIDE SERPL-SCNC: 113 MMOL/L — HIGH (ref 98–107)
CO2 SERPL-SCNC: 27 MMOL/L — SIGNIFICANT CHANGE UP (ref 22–29)
CREAT SERPL-MCNC: 0.65 MG/DL — SIGNIFICANT CHANGE UP (ref 0.5–1.3)
GLUCOSE BLDC GLUCOMTR-MCNC: 101 MG/DL — HIGH (ref 70–99)
GLUCOSE BLDC GLUCOMTR-MCNC: 122 MG/DL — HIGH (ref 70–99)
GLUCOSE BLDC GLUCOMTR-MCNC: 95 MG/DL — SIGNIFICANT CHANGE UP (ref 70–99)
GLUCOSE BLDC GLUCOMTR-MCNC: 99 MG/DL — SIGNIFICANT CHANGE UP (ref 70–99)
GLUCOSE SERPL-MCNC: 96 MG/DL — SIGNIFICANT CHANGE UP (ref 70–115)
HCT VFR BLD CALC: 27 % — LOW (ref 42–52)
HGB BLD-MCNC: 8.6 G/DL — LOW (ref 14–18)
MAGNESIUM SERPL-MCNC: 1.6 MG/DL — SIGNIFICANT CHANGE UP (ref 1.6–2.6)
MCHC RBC-ENTMCNC: 30.2 PG — SIGNIFICANT CHANGE UP (ref 27–31)
MCHC RBC-ENTMCNC: 31.9 G/DL — LOW (ref 32–36)
MCV RBC AUTO: 94.7 FL — HIGH (ref 80–94)
PHOSPHATE SERPL-MCNC: 3.6 MG/DL — SIGNIFICANT CHANGE UP (ref 2.4–4.7)
PLATELET # BLD AUTO: 83 K/UL — LOW (ref 150–400)
POTASSIUM SERPL-MCNC: 3.8 MMOL/L — SIGNIFICANT CHANGE UP (ref 3.5–5.3)
POTASSIUM SERPL-SCNC: 3.8 MMOL/L — SIGNIFICANT CHANGE UP (ref 3.5–5.3)
RBC # BLD: 2.85 M/UL — LOW (ref 4.6–6.2)
RBC # FLD: 17.1 % — HIGH (ref 11–15.6)
SODIUM SERPL-SCNC: 152 MMOL/L — HIGH (ref 135–145)
WBC # BLD: 5.6 K/UL — SIGNIFICANT CHANGE UP (ref 4.8–10.8)
WBC # FLD AUTO: 5.6 K/UL — SIGNIFICANT CHANGE UP (ref 4.8–10.8)

## 2018-07-30 PROCEDURE — 74018 RADEX ABDOMEN 1 VIEW: CPT | Mod: 26

## 2018-07-30 PROCEDURE — 99223 1ST HOSP IP/OBS HIGH 75: CPT

## 2018-07-30 PROCEDURE — 71045 X-RAY EXAM CHEST 1 VIEW: CPT | Mod: 26

## 2018-07-30 RX ORDER — SODIUM CHLORIDE 9 MG/ML
1000 INJECTION, SOLUTION INTRAVENOUS
Qty: 0 | Refills: 0 | Status: DISCONTINUED | OUTPATIENT
Start: 2018-07-30 | End: 2018-07-30

## 2018-07-30 RX ORDER — LEVOTHYROXINE SODIUM 125 MCG
50 TABLET ORAL DAILY
Qty: 0 | Refills: 0 | Status: DISCONTINUED | OUTPATIENT
Start: 2018-07-30 | End: 2018-08-06

## 2018-07-30 RX ORDER — GABAPENTIN 400 MG/1
300 CAPSULE ORAL THREE TIMES A DAY
Qty: 0 | Refills: 0 | Status: DISCONTINUED | OUTPATIENT
Start: 2018-07-30 | End: 2018-08-06

## 2018-07-30 RX ADMIN — Medication 3 MILLILITER(S): at 22:01

## 2018-07-30 RX ADMIN — Medication 3 MILLILITER(S): at 10:08

## 2018-07-30 RX ADMIN — CHLORHEXIDINE GLUCONATE 1 APPLICATION(S): 213 SOLUTION TOPICAL at 11:34

## 2018-07-30 RX ADMIN — GABAPENTIN 300 MILLIGRAM(S): 400 CAPSULE ORAL at 18:11

## 2018-07-30 RX ADMIN — TAMSULOSIN HYDROCHLORIDE 0.4 MILLIGRAM(S): 0.4 CAPSULE ORAL at 23:05

## 2018-07-30 RX ADMIN — PIPERACILLIN AND TAZOBACTAM 25 GRAM(S): 4; .5 INJECTION, POWDER, LYOPHILIZED, FOR SOLUTION INTRAVENOUS at 09:23

## 2018-07-30 RX ADMIN — ENOXAPARIN SODIUM 30 MILLIGRAM(S): 100 INJECTION SUBCUTANEOUS at 18:10

## 2018-07-30 RX ADMIN — AMIODARONE HYDROCHLORIDE 200 MILLIGRAM(S): 400 TABLET ORAL at 05:02

## 2018-07-30 RX ADMIN — GABAPENTIN 300 MILLIGRAM(S): 400 CAPSULE ORAL at 23:05

## 2018-07-30 RX ADMIN — Medication 500 MILLIGRAM(S): at 18:11

## 2018-07-30 RX ADMIN — ENOXAPARIN SODIUM 30 MILLIGRAM(S): 100 INJECTION SUBCUTANEOUS at 05:02

## 2018-07-30 RX ADMIN — Medication 500 MILLIGRAM(S): at 05:02

## 2018-07-30 NOTE — PROGRESS NOTE ADULT - ASSESSMENT
52-year-old male s/p fall with Left subdural hematoma and interval development of cerebral contusions. Hemodynamically stable.  - d/c/ rebolledo  - d/c abx, d/c IVF  - continue tube feeds  - KUB this AM shows no evidence of ileus or obstruction  - Remove R chest wall central line  - pain control  - dispo: PT recs: group home with PT

## 2018-07-30 NOTE — PHYSICAL THERAPY INITIAL EVALUATION ADULT - ACTIVE RANGE OF MOTION EXAMINATION, REHAB EVAL
Gurmeet LE at least 3-/5, Gurmeet Ankle joints in plantarflexion contracture, left more compare to right , slight movement noted/bilateral upper extremity Active ROM was WFL (within functional limits)

## 2018-07-30 NOTE — PHYSICAL THERAPY INITIAL EVALUATION ADULT - CRITERIA FOR SKILLED THERAPEUTIC INTERVENTIONS
rehab potential/predicted duration of therapy intervention/functional limitations in following categories/impairments found/anticipated equipment needs at discharge/risk reduction/prevention/therapy frequency/anticipated discharge recommendation

## 2018-07-30 NOTE — PHYSICAL THERAPY INITIAL EVALUATION ADULT - IMPAIRMENTS FOUND, PT EVAL
aerobic capacity/endurance/muscle strength/poor safety awareness/gait, locomotion, and balance/neuromotor development and sensory integration/ROM/cognitive impairment

## 2018-07-30 NOTE — PHYSICAL THERAPY INITIAL EVALUATION ADULT - IMPAIRED TRANSFERS: SIT/STAND, REHAB EVAL
decreased strength/standing on the front of the foot (forefoot)/cognition/decreased flexibility/decreased ROM/impaired balance

## 2018-07-31 LAB
ANION GAP SERPL CALC-SCNC: 13 MMOL/L — SIGNIFICANT CHANGE UP (ref 5–17)
BUN SERPL-MCNC: 20 MG/DL — SIGNIFICANT CHANGE UP (ref 8–20)
CALCIUM SERPL-MCNC: 8.7 MG/DL — SIGNIFICANT CHANGE UP (ref 8.6–10.2)
CHLORIDE SERPL-SCNC: 112 MMOL/L — HIGH (ref 98–107)
CO2 SERPL-SCNC: 26 MMOL/L — SIGNIFICANT CHANGE UP (ref 22–29)
CREAT SERPL-MCNC: 0.69 MG/DL — SIGNIFICANT CHANGE UP (ref 0.5–1.3)
GLUCOSE SERPL-MCNC: 69 MG/DL — LOW (ref 70–115)
HCT VFR BLD CALC: 28.7 % — LOW (ref 42–52)
HGB BLD-MCNC: 9 G/DL — LOW (ref 14–18)
MAGNESIUM SERPL-MCNC: 1.7 MG/DL — SIGNIFICANT CHANGE UP (ref 1.6–2.6)
MCHC RBC-ENTMCNC: 29.8 PG — SIGNIFICANT CHANGE UP (ref 27–31)
MCHC RBC-ENTMCNC: 31.4 G/DL — LOW (ref 32–36)
MCV RBC AUTO: 95 FL — HIGH (ref 80–94)
PHOSPHATE SERPL-MCNC: 3.9 MG/DL — SIGNIFICANT CHANGE UP (ref 2.4–4.7)
PLATELET # BLD AUTO: 97 K/UL — LOW (ref 150–400)
POTASSIUM SERPL-MCNC: 3.8 MMOL/L — SIGNIFICANT CHANGE UP (ref 3.5–5.3)
POTASSIUM SERPL-SCNC: 3.8 MMOL/L — SIGNIFICANT CHANGE UP (ref 3.5–5.3)
RBC # BLD: 3.02 M/UL — LOW (ref 4.6–6.2)
RBC # FLD: 16.7 % — HIGH (ref 11–15.6)
SODIUM SERPL-SCNC: 151 MMOL/L — HIGH (ref 135–145)
WBC # BLD: 4.2 K/UL — LOW (ref 4.8–10.8)
WBC # FLD AUTO: 4.2 K/UL — LOW (ref 4.8–10.8)

## 2018-07-31 PROCEDURE — 74018 RADEX ABDOMEN 1 VIEW: CPT | Mod: 26

## 2018-07-31 PROCEDURE — 99232 SBSQ HOSP IP/OBS MODERATE 35: CPT

## 2018-07-31 RX ORDER — POTASSIUM CHLORIDE 20 MEQ
40 PACKET (EA) ORAL ONCE
Qty: 0 | Refills: 0 | Status: COMPLETED | OUTPATIENT
Start: 2018-07-31 | End: 2018-07-31

## 2018-07-31 RX ORDER — MAGNESIUM SULFATE 500 MG/ML
2 VIAL (ML) INJECTION ONCE
Qty: 0 | Refills: 0 | Status: COMPLETED | OUTPATIENT
Start: 2018-07-31 | End: 2018-07-31

## 2018-07-31 RX ORDER — CHLORHEXIDINE GLUCONATE 213 G/1000ML
15 SOLUTION TOPICAL
Qty: 0 | Refills: 0 | Status: DISCONTINUED | OUTPATIENT
Start: 2018-07-31 | End: 2018-08-06

## 2018-07-31 RX ADMIN — Medication 500 MILLIGRAM(S): at 17:50

## 2018-07-31 RX ADMIN — ENOXAPARIN SODIUM 30 MILLIGRAM(S): 100 INJECTION SUBCUTANEOUS at 06:20

## 2018-07-31 RX ADMIN — ENOXAPARIN SODIUM 30 MILLIGRAM(S): 100 INJECTION SUBCUTANEOUS at 17:50

## 2018-07-31 RX ADMIN — Medication 3 MILLILITER(S): at 09:47

## 2018-07-31 RX ADMIN — GABAPENTIN 300 MILLIGRAM(S): 400 CAPSULE ORAL at 06:20

## 2018-07-31 RX ADMIN — Medication 40 MILLIEQUIVALENT(S): at 09:29

## 2018-07-31 RX ADMIN — CHLORHEXIDINE GLUCONATE 1 APPLICATION(S): 213 SOLUTION TOPICAL at 11:33

## 2018-07-31 RX ADMIN — TAMSULOSIN HYDROCHLORIDE 0.4 MILLIGRAM(S): 0.4 CAPSULE ORAL at 22:39

## 2018-07-31 RX ADMIN — Medication 50 GRAM(S): at 09:29

## 2018-07-31 RX ADMIN — Medication 500 MILLIGRAM(S): at 06:19

## 2018-07-31 RX ADMIN — GABAPENTIN 300 MILLIGRAM(S): 400 CAPSULE ORAL at 22:39

## 2018-07-31 RX ADMIN — CHLORHEXIDINE GLUCONATE 15 MILLILITER(S): 213 SOLUTION TOPICAL at 17:50

## 2018-07-31 RX ADMIN — Medication 3 MILLILITER(S): at 20:15

## 2018-07-31 RX ADMIN — Medication 50 MICROGRAM(S): at 06:20

## 2018-07-31 RX ADMIN — GABAPENTIN 300 MILLIGRAM(S): 400 CAPSULE ORAL at 13:54

## 2018-07-31 NOTE — PROGRESS NOTE ADULT - ASSESSMENT
52-year-old male s/p fall with Left subdural hematoma and interval development of cerebral contusions. Hemodynamically stable.  - continue tube feeds  - tube study with Gastrogaffin shows proper placement and no extravasation   - pain control  - continue free water flushes [id number]  - dispo: PT recs: group home with PT

## 2018-08-01 DIAGNOSIS — E87.0 HYPEROSMOLALITY AND HYPERNATREMIA: ICD-10-CM

## 2018-08-01 LAB
ANION GAP SERPL CALC-SCNC: 12 MMOL/L — SIGNIFICANT CHANGE UP (ref 5–17)
BASOPHILS # BLD AUTO: 0 K/UL — SIGNIFICANT CHANGE UP (ref 0–0.2)
BASOPHILS NFR BLD AUTO: 0.7 % — SIGNIFICANT CHANGE UP (ref 0–2)
BUN SERPL-MCNC: 23 MG/DL — HIGH (ref 8–20)
CALCIUM SERPL-MCNC: 8.5 MG/DL — LOW (ref 8.6–10.2)
CHLORIDE SERPL-SCNC: 112 MMOL/L — HIGH (ref 98–107)
CO2 SERPL-SCNC: 26 MMOL/L — SIGNIFICANT CHANGE UP (ref 22–29)
CREAT SERPL-MCNC: 0.7 MG/DL — SIGNIFICANT CHANGE UP (ref 0.5–1.3)
CULTURE RESULTS: SIGNIFICANT CHANGE UP
CULTURE RESULTS: SIGNIFICANT CHANGE UP
EOSINOPHIL # BLD AUTO: 0.1 K/UL — SIGNIFICANT CHANGE UP (ref 0–0.5)
EOSINOPHIL NFR BLD AUTO: 1.7 % — SIGNIFICANT CHANGE UP (ref 0–5)
GLUCOSE SERPL-MCNC: 108 MG/DL — SIGNIFICANT CHANGE UP (ref 70–115)
HCT VFR BLD CALC: 26.8 % — LOW (ref 42–52)
HGB BLD-MCNC: 8.4 G/DL — LOW (ref 14–18)
LYMPHOCYTES # BLD AUTO: 1.3 K/UL — SIGNIFICANT CHANGE UP (ref 1–4.8)
LYMPHOCYTES # BLD AUTO: 30.2 % — SIGNIFICANT CHANGE UP (ref 20–55)
MAGNESIUM SERPL-MCNC: 1.8 MG/DL — SIGNIFICANT CHANGE UP (ref 1.6–2.6)
MCHC RBC-ENTMCNC: 30 PG — SIGNIFICANT CHANGE UP (ref 27–31)
MCHC RBC-ENTMCNC: 31.3 G/DL — LOW (ref 32–36)
MCV RBC AUTO: 95.7 FL — HIGH (ref 80–94)
MONOCYTES # BLD AUTO: 0.4 K/UL — SIGNIFICANT CHANGE UP (ref 0–0.8)
MONOCYTES NFR BLD AUTO: 8.8 % — SIGNIFICANT CHANGE UP (ref 3–10)
NEUTROPHILS # BLD AUTO: 2.4 K/UL — SIGNIFICANT CHANGE UP (ref 1.8–8)
NEUTROPHILS NFR BLD AUTO: 57.4 % — SIGNIFICANT CHANGE UP (ref 37–73)
PHOSPHATE SERPL-MCNC: 3.9 MG/DL — SIGNIFICANT CHANGE UP (ref 2.4–4.7)
PLATELET # BLD AUTO: 128 K/UL — LOW (ref 150–400)
POTASSIUM SERPL-MCNC: 3.8 MMOL/L — SIGNIFICANT CHANGE UP (ref 3.5–5.3)
POTASSIUM SERPL-SCNC: 3.8 MMOL/L — SIGNIFICANT CHANGE UP (ref 3.5–5.3)
RBC # BLD: 2.8 M/UL — LOW (ref 4.6–6.2)
RBC # FLD: 16.6 % — HIGH (ref 11–15.6)
SODIUM SERPL-SCNC: 150 MMOL/L — HIGH (ref 135–145)
SPECIMEN SOURCE: SIGNIFICANT CHANGE UP
SPECIMEN SOURCE: SIGNIFICANT CHANGE UP
WBC # BLD: 4.2 K/UL — LOW (ref 4.8–10.8)
WBC # FLD AUTO: 4.2 K/UL — LOW (ref 4.8–10.8)

## 2018-08-01 PROCEDURE — 99233 SBSQ HOSP IP/OBS HIGH 50: CPT

## 2018-08-01 RX ORDER — MAGNESIUM SULFATE 500 MG/ML
2 VIAL (ML) INJECTION ONCE
Qty: 0 | Refills: 0 | Status: COMPLETED | OUTPATIENT
Start: 2018-08-01 | End: 2018-08-01

## 2018-08-01 RX ADMIN — GABAPENTIN 300 MILLIGRAM(S): 400 CAPSULE ORAL at 13:11

## 2018-08-01 RX ADMIN — Medication 500 MILLIGRAM(S): at 05:13

## 2018-08-01 RX ADMIN — CHLORHEXIDINE GLUCONATE 1 APPLICATION(S): 213 SOLUTION TOPICAL at 15:31

## 2018-08-01 RX ADMIN — HYDROMORPHONE HYDROCHLORIDE 0.5 MILLIGRAM(S): 2 INJECTION INTRAMUSCULAR; INTRAVENOUS; SUBCUTANEOUS at 03:08

## 2018-08-01 RX ADMIN — ENOXAPARIN SODIUM 30 MILLIGRAM(S): 100 INJECTION SUBCUTANEOUS at 05:14

## 2018-08-01 RX ADMIN — CHLORHEXIDINE GLUCONATE 15 MILLILITER(S): 213 SOLUTION TOPICAL at 18:05

## 2018-08-01 RX ADMIN — Medication 500 MILLIGRAM(S): at 18:05

## 2018-08-01 RX ADMIN — GABAPENTIN 300 MILLIGRAM(S): 400 CAPSULE ORAL at 05:10

## 2018-08-01 RX ADMIN — Medication 50 MICROGRAM(S): at 05:10

## 2018-08-01 RX ADMIN — HYDROMORPHONE HYDROCHLORIDE 0.5 MILLIGRAM(S): 2 INJECTION INTRAMUSCULAR; INTRAVENOUS; SUBCUTANEOUS at 02:35

## 2018-08-01 RX ADMIN — ENOXAPARIN SODIUM 30 MILLIGRAM(S): 100 INJECTION SUBCUTANEOUS at 18:05

## 2018-08-01 RX ADMIN — CHLORHEXIDINE GLUCONATE 15 MILLILITER(S): 213 SOLUTION TOPICAL at 05:10

## 2018-08-01 RX ADMIN — TAMSULOSIN HYDROCHLORIDE 0.4 MILLIGRAM(S): 0.4 CAPSULE ORAL at 21:09

## 2018-08-01 RX ADMIN — Medication 50 GRAM(S): at 15:31

## 2018-08-01 RX ADMIN — Medication 3 MILLILITER(S): at 09:25

## 2018-08-01 RX ADMIN — GABAPENTIN 300 MILLIGRAM(S): 400 CAPSULE ORAL at 21:09

## 2018-08-01 NOTE — PROGRESS NOTE ADULT - ASSESSMENT
52-year-old male s/p fall with Left subdural hematoma and interval development of cerebral contusions. Hemodynamically stable.  - continue tube feeds  - pain control  - Increase FW flushes 250 q6hrs, recheck AM  - dispo: PT recs: group home with PT

## 2018-08-01 NOTE — OCCUPATIONAL THERAPY INITIAL EVALUATION ADULT - DIAGNOSIS, OT EVAL
Bilat anterior inferior frontal lobe hemorrhagic contusions; Left SDH with right to left midline shift

## 2018-08-01 NOTE — OCCUPATIONAL THERAPY INITIAL EVALUATION ADULT - PLANNED THERAPY INTERVENTIONS, OT EVAL
motor coordination training/transfer training/balance training/ADL retraining/bed mobility training/strengthening/ROM/cognitive, visual perceptual/neuromuscular re-education

## 2018-08-02 ENCOUNTER — TRANSCRIPTION ENCOUNTER (OUTPATIENT)
Age: 53
End: 2018-08-02

## 2018-08-02 LAB
ANION GAP SERPL CALC-SCNC: 11 MMOL/L — SIGNIFICANT CHANGE UP (ref 5–17)
BASOPHILS # BLD AUTO: 0 K/UL — SIGNIFICANT CHANGE UP (ref 0–0.2)
BASOPHILS NFR BLD AUTO: 0.2 % — SIGNIFICANT CHANGE UP (ref 0–2)
BUN SERPL-MCNC: 20 MG/DL — SIGNIFICANT CHANGE UP (ref 8–20)
CALCIUM SERPL-MCNC: 8.4 MG/DL — LOW (ref 8.6–10.2)
CHLORIDE SERPL-SCNC: 112 MMOL/L — HIGH (ref 98–107)
CO2 SERPL-SCNC: 26 MMOL/L — SIGNIFICANT CHANGE UP (ref 22–29)
CREAT SERPL-MCNC: 0.56 MG/DL — SIGNIFICANT CHANGE UP (ref 0.5–1.3)
EOSINOPHIL # BLD AUTO: 0 K/UL — SIGNIFICANT CHANGE UP (ref 0–0.5)
EOSINOPHIL NFR BLD AUTO: 1.1 % — SIGNIFICANT CHANGE UP (ref 0–5)
GLUCOSE SERPL-MCNC: 115 MG/DL — SIGNIFICANT CHANGE UP (ref 70–115)
HCT VFR BLD CALC: 25.4 % — LOW (ref 42–52)
HGB BLD-MCNC: 8 G/DL — LOW (ref 14–18)
LYMPHOCYTES # BLD AUTO: 1.2 K/UL — SIGNIFICANT CHANGE UP (ref 1–4.8)
LYMPHOCYTES # BLD AUTO: 26.2 % — SIGNIFICANT CHANGE UP (ref 20–55)
MAGNESIUM SERPL-MCNC: 1.9 MG/DL — SIGNIFICANT CHANGE UP (ref 1.6–2.6)
MCHC RBC-ENTMCNC: 30 PG — SIGNIFICANT CHANGE UP (ref 27–31)
MCHC RBC-ENTMCNC: 31.5 G/DL — LOW (ref 32–36)
MCV RBC AUTO: 95.1 FL — HIGH (ref 80–94)
MONOCYTES # BLD AUTO: 0.5 K/UL — SIGNIFICANT CHANGE UP (ref 0–0.8)
MONOCYTES NFR BLD AUTO: 11.6 % — HIGH (ref 3–10)
NEUTROPHILS # BLD AUTO: 2.7 K/UL — SIGNIFICANT CHANGE UP (ref 1.8–8)
NEUTROPHILS NFR BLD AUTO: 60 % — SIGNIFICANT CHANGE UP (ref 37–73)
PHOSPHATE SERPL-MCNC: 3.3 MG/DL — SIGNIFICANT CHANGE UP (ref 2.4–4.7)
PLATELET # BLD AUTO: 142 K/UL — LOW (ref 150–400)
POTASSIUM SERPL-MCNC: 4.1 MMOL/L — SIGNIFICANT CHANGE UP (ref 3.5–5.3)
POTASSIUM SERPL-SCNC: 4.1 MMOL/L — SIGNIFICANT CHANGE UP (ref 3.5–5.3)
RBC # BLD: 2.67 M/UL — LOW (ref 4.6–6.2)
RBC # FLD: 16.5 % — HIGH (ref 11–15.6)
SODIUM SERPL-SCNC: 149 MMOL/L — HIGH (ref 135–145)
WBC # BLD: 4.6 K/UL — LOW (ref 4.8–10.8)
WBC # FLD AUTO: 4.6 K/UL — LOW (ref 4.8–10.8)

## 2018-08-02 PROCEDURE — 99232 SBSQ HOSP IP/OBS MODERATE 35: CPT

## 2018-08-02 RX ORDER — MAGNESIUM SULFATE 500 MG/ML
2 VIAL (ML) INJECTION ONCE
Qty: 0 | Refills: 0 | Status: COMPLETED | OUTPATIENT
Start: 2018-08-02 | End: 2018-08-02

## 2018-08-02 RX ORDER — HALOPERIDOL DECANOATE 100 MG/ML
10 INJECTION INTRAMUSCULAR EVERY 6 HOURS
Qty: 0 | Refills: 0 | Status: COMPLETED | OUTPATIENT
Start: 2018-08-02 | End: 2018-08-02

## 2018-08-02 RX ADMIN — GABAPENTIN 300 MILLIGRAM(S): 400 CAPSULE ORAL at 15:42

## 2018-08-02 RX ADMIN — CHLORHEXIDINE GLUCONATE 1 APPLICATION(S): 213 SOLUTION TOPICAL at 15:42

## 2018-08-02 RX ADMIN — HALOPERIDOL DECANOATE 10 MILLIGRAM(S): 100 INJECTION INTRAMUSCULAR at 20:17

## 2018-08-02 RX ADMIN — ENOXAPARIN SODIUM 30 MILLIGRAM(S): 100 INJECTION SUBCUTANEOUS at 18:34

## 2018-08-02 RX ADMIN — CHLORHEXIDINE GLUCONATE 15 MILLILITER(S): 213 SOLUTION TOPICAL at 18:34

## 2018-08-02 RX ADMIN — HYDROMORPHONE HYDROCHLORIDE 0.5 MILLIGRAM(S): 2 INJECTION INTRAMUSCULAR; INTRAVENOUS; SUBCUTANEOUS at 02:04

## 2018-08-02 RX ADMIN — Medication 50 GRAM(S): at 15:41

## 2018-08-02 RX ADMIN — CHLORHEXIDINE GLUCONATE 15 MILLILITER(S): 213 SOLUTION TOPICAL at 05:27

## 2018-08-02 RX ADMIN — TAMSULOSIN HYDROCHLORIDE 0.4 MILLIGRAM(S): 0.4 CAPSULE ORAL at 21:02

## 2018-08-02 RX ADMIN — ENOXAPARIN SODIUM 30 MILLIGRAM(S): 100 INJECTION SUBCUTANEOUS at 05:28

## 2018-08-02 RX ADMIN — Medication 2 DROP(S): at 21:05

## 2018-08-02 RX ADMIN — Medication 500 MILLIGRAM(S): at 18:34

## 2018-08-02 RX ADMIN — Medication 50 MICROGRAM(S): at 05:28

## 2018-08-02 RX ADMIN — Medication 500 MILLIGRAM(S): at 05:28

## 2018-08-02 RX ADMIN — HYDROMORPHONE HYDROCHLORIDE 0.5 MILLIGRAM(S): 2 INJECTION INTRAMUSCULAR; INTRAVENOUS; SUBCUTANEOUS at 01:04

## 2018-08-02 RX ADMIN — GABAPENTIN 300 MILLIGRAM(S): 400 CAPSULE ORAL at 05:28

## 2018-08-02 RX ADMIN — Medication 110 MILLIGRAM(S): at 15:41

## 2018-08-02 RX ADMIN — GABAPENTIN 300 MILLIGRAM(S): 400 CAPSULE ORAL at 21:02

## 2018-08-02 NOTE — DISCHARGE NOTE ADULT - PATIENT PORTAL LINK FT
You can access the [facility] Patient Portal, offered by [facility], by registering with the following website: [email or web address]

## 2018-08-02 NOTE — DISCHARGE NOTE ADULT - PLAN OF CARE
alleviation of pain and symtpoms alleviation of pain and symptoms Follow up with Dr. Herrera of Neurosurgery as outpatient. Resume enteral tube feeds as tolerated. Continue antibiotics x 10 days total for cellulitis around peg site. Return to ED if worsening infection around peg site, intolerance of feeds as result or depressed mental status.

## 2018-08-02 NOTE — DISCHARGE NOTE ADULT - MEDICATION SUMMARY - MEDICATIONS TO TAKE
I will START or STAY ON the medications listed below when I get home from the hospital:    Flomax 0.4 mg oral capsule  -- 1 cap(s) by mouth once a day  -- Indication: For Home med    valproic acid 250 mg/5 mL oral syrup  -- 10 milliliter(s) by mouth 2 times a day  -- Indication: For Home med    clonazePAM 0.5 mg oral tablet  -- by gastrostomy tube 2 times a day  -- Indication: For Home med    clonazePAM 2 mg oral tablet  -- 1 tab(s) by gastrostomy tube once a day (at bedtime)  -- Indication: For Home med    gabapentin 300 mg oral tablet  -- 1 tab(s) by mouth 3 times a day  -- Indication: For Home med    doxycycline 25 mg/5 mL oral liquid  -- 20 milliliter(s) by mouth every 12 hours  -- Indication: For peg cellulitis. x 10 day course. started 8/2/18    tiotropium 18 mcg inhalation capsule  -- 1 cap(s) inhaled once a day  -- Indication: For Home med    ocular lubricant ophthalmic solution  -- 2 drop(s) to each affected eye every 3 hours  -- Indication: For Home med    Prevacid  -- 30 milligram(s) by gastrostomy tube  -- Indication: For Home med    levothyroxine 50 mcg (0.05 mg) oral tablet  -- 1 tab(s) by gastrostomy tube once a day  -- Indication: For Home med    Centrum oral tablet  -- 1 tab(s) by mouth once a day  -- Indication: For Home med

## 2018-08-02 NOTE — PROGRESS NOTE ADULT - ATTENDING COMMENTS
Erythema around PEG tube site appears to worsening.  Blanchable.  likely cellulitis.  Given institutionalization at risk for MRSA.  Will start 10 day course of doxycycline.
MORGAN Attg:    see above    eyes open spontaneously    WATSON and follows simple commands    agree with plan as above
NSGY Attg:    see above    patient seen and examined    Exam:  GCS 11T: E4 V1T M6  eyes open spontaneously  tracks examiner  follows simple commands  strength grossly symmetric bilaterally    CT head reviewed -- slight increase in bifrontal contusions    A/P:  - cont ICU care  - repeat CT head 7/28 am to establish stability of ICH prior to consideration of chemical DVT ppx  - will follow
poor historian.  Unable to obtain useful hx.    neurologically near or at baseline per report.  No source of infection identified.  Will stop abx.  Suspect was severely dehydrated and in hypovolemic shock.      A-fib appears converted.  Stop amiodarone. Likely stress response.  Will observe.  If recurs will need cardiology consult.      Hypernatremia - Given current uop, BUN and creat as well as PE that does not suggest serve dehydration any longer, suspect this is iatrogenic from NSS resuscitation.  Will restart tube feeds with free water and stop IV fluids.  Monitor UOP and serum levels.  Continue flomax.  Will consider voiding trial once hypernatremia begins to correct as pt will not be reliable to collect urine and I/Os needed to treat appropriately.
Patient seen and examiend  no change on baseline exam, no focal.  start lovenox.  Pt dispo planning
The patient was seen and examined  No new problems    Neurologic:  Follows commands, GCS=11T  Respiratory: PSV with good gas exchange  Hemodynamically normal, transition to PO amiodorne  Urine flow adequate  Hgb okay  Normothermic.  Cultures negative    Plan:  Wean mechanical ventilation  Start nutrition  DVT prophylaxis

## 2018-08-02 NOTE — DISCHARGE NOTE ADULT - HOSPITAL COURSE
HPI:  52yoM with history of seizures, MR, and glaucoma found down at a group home. Patient went to bed at 9:30pm and patient was found down at 6:30am. He is nonverbal and has severe agitation at baseline. Patient was taken to [place] where he was found to have a Left parafrontal epidural and a minimal subdural hematoma with no midline shift. C-spine shows no fractures or subluxations. Patient was intubated for altered mental status and transfer. He was intubated with 70 of succinylcholine and 20 of etomidate. Sedated with propofol on 10/min. Intubation confirmed on chest xray. His VENT settings were on , PEEP 5, and 40% FiO2, satting at 95% O2. He was BIBEMS to [facility] trauma bay. A primary survey was started. Patient was intubated, c-collar placed, and GCS 7T. On secondary examination patient was found to have no obvious signs of external trauma.     Trauma bay interventions: patient was given 2 boluses, 50 Rocuronium, 5mg of morphine. 7.5 ET tube confirmed at 25cm at the lip, confirmed with chest xr. Viramontes catheter and OG tube placed.    A: Intubated, on vent and propofol.  B: CTAB. Symmetrical chest rise  C: 2+ central (femoral) & peripheral pulses (Radial, DP)  D: GCS 7T.   E: No gross deformities on primary exposure    Hospital Course: HPI:  52yoM with history of seizures, MR, and glaucoma found down at a group home. Patient went to bed at 9:30pm and patient was found down at 6:30am. He is nonverbal and has severe agitation at baseline. Patient was taken to [place] where he was found to have a Left parafrontal epidural and a minimal subdural hematoma with no midline shift. C-spine shows no fractures or subluxations. Patient was intubated for altered mental status and transfer. He was intubated with 70 of succinylcholine and 20 of etomidate. Sedated with propofol on 10/min. Intubation confirmed on chest xray. His VENT settings were on , PEEP 5, and 40% FiO2, satting at 95% O2. He was BIBEMS to [facility] trauma bay. A primary survey was started. Patient was intubated, c-collar placed, and GCS 7T. On secondary examination patient was found to have no obvious signs of external trauma.     Trauma bay interventions: patient was given 2 boluses, 50 Rocuronium, 5mg of morphine. 7.5 ET tube confirmed at 25cm at the lip, confirmed with chest xr. Viramontes catheter and OG tube placed.    A: Intubated, on vent and propofol.  B: CTAB. Symmetrical chest rise  C: 2+ central (femoral) & peripheral pulses (Radial, DP)  D: GCS 7T.   E: No gross deformities on primary exposure    Hospital Course:  Patient was intubated and admitted to SICU. Neurosurgery was consulted and conservative management undertaken for subdural hemorrhage. Repeat CT imaging was stable. Patient was subsequently weaned from sedation and extubated on 7/27. PT/OT/Rehab services evaluated patient with recommendation for Acute Inpatient Rehab once medically stable. Patient became more lucid once transferred to floor. Central line and arterial line were discontinued. While on floor patient noted to have cellulitis around PEG site which abx were started for on 8/2/18. Total course of 10 days suggested. Patient now stable for discharge with follow up in clinics as outpatient (Numbers [provided).     Of note patient also noted to be hypernatremic. Started on free water via PEG and showed gradual improvement.

## 2018-08-02 NOTE — DISCHARGE NOTE ADULT - CARE PLAN
Principal Discharge DX:	Subdural hematoma  Goal:	alleviation of pain and symtpoms  Secondary Diagnosis:	Seizures  Secondary Diagnosis:	MR (mental retardation)  Secondary Diagnosis:	Hypernatremia Principal Discharge DX:	Subdural hematoma  Goal:	alleviation of pain and symptoms  Assessment and plan of treatment:	Follow up with Dr. Herrera of Neurosurgery as outpatient. Resume enteral tube feeds as tolerated. Continue antibiotics x 10 days total for cellulitis around peg site. Return to ED if worsening infection around peg site, intolerance of feeds as result or depressed mental status.  Secondary Diagnosis:	Seizures  Secondary Diagnosis:	MR (mental retardation)  Secondary Diagnosis:	Hypernatremia

## 2018-08-02 NOTE — DISCHARGE NOTE ADULT - CARE PROVIDER_API CALL
[facility],   [address] [place] [address], [address] [place] [address]  Phone: [phone]  Fax: (   )    -

## 2018-08-02 NOTE — PROGRESS NOTE ADULT - ASSESSMENT
52-year-old male s/p fall with Left subdural hematoma and interval development of cerebral contusions. Hemodynamically stable. Na: 149 - improving. Increasing cellulitis around PEG site.  - continue tube feeds  - pain control  - Increase FW flushes 250 q6hrs, recheck AM  - start doxy, 10 days for cellulitis to also cover MRSA when going back to group home  - dispo: PT recs: group home with PT

## 2018-08-03 LAB
ANION GAP SERPL CALC-SCNC: 14 MMOL/L — SIGNIFICANT CHANGE UP (ref 5–17)
ANISOCYTOSIS BLD QL: SLIGHT — SIGNIFICANT CHANGE UP
BUN SERPL-MCNC: 18 MG/DL — SIGNIFICANT CHANGE UP (ref 8–20)
CALCIUM SERPL-MCNC: 8.7 MG/DL — SIGNIFICANT CHANGE UP (ref 8.6–10.2)
CHLORIDE SERPL-SCNC: 107 MMOL/L — SIGNIFICANT CHANGE UP (ref 98–107)
CO2 SERPL-SCNC: 24 MMOL/L — SIGNIFICANT CHANGE UP (ref 22–29)
CREAT SERPL-MCNC: 0.5 MG/DL — SIGNIFICANT CHANGE UP (ref 0.5–1.3)
GLUCOSE SERPL-MCNC: 93 MG/DL — SIGNIFICANT CHANGE UP (ref 70–115)
HCT VFR BLD CALC: 27.5 % — LOW (ref 42–52)
HGB BLD-MCNC: 8.7 G/DL — LOW (ref 14–18)
HYPOCHROMIA BLD QL: SLIGHT — SIGNIFICANT CHANGE UP
LYMPHOCYTES # BLD AUTO: 23 % — SIGNIFICANT CHANGE UP (ref 20–55)
MACROCYTES BLD QL: SLIGHT — SIGNIFICANT CHANGE UP
MAGNESIUM SERPL-MCNC: 1.9 MG/DL — SIGNIFICANT CHANGE UP (ref 1.6–2.6)
MCHC RBC-ENTMCNC: 30 PG — SIGNIFICANT CHANGE UP (ref 27–31)
MCHC RBC-ENTMCNC: 31.6 G/DL — LOW (ref 32–36)
MCV RBC AUTO: 94.8 FL — HIGH (ref 80–94)
MICROCYTES BLD QL: SLIGHT — SIGNIFICANT CHANGE UP
MONOCYTES NFR BLD AUTO: 8 % — SIGNIFICANT CHANGE UP (ref 3–10)
NEUTROPHILS NFR BLD AUTO: 68 % — SIGNIFICANT CHANGE UP (ref 37–73)
OVALOCYTES BLD QL SMEAR: SLIGHT — SIGNIFICANT CHANGE UP
PHOSPHATE SERPL-MCNC: 3.2 MG/DL — SIGNIFICANT CHANGE UP (ref 2.4–4.7)
PLAT MORPH BLD: NORMAL — SIGNIFICANT CHANGE UP
PLATELET # BLD AUTO: 179 K/UL — SIGNIFICANT CHANGE UP (ref 150–400)
POIKILOCYTOSIS BLD QL AUTO: SLIGHT — SIGNIFICANT CHANGE UP
POTASSIUM SERPL-MCNC: 3.8 MMOL/L — SIGNIFICANT CHANGE UP (ref 3.5–5.3)
POTASSIUM SERPL-SCNC: 3.8 MMOL/L — SIGNIFICANT CHANGE UP (ref 3.5–5.3)
RBC # BLD: 2.9 M/UL — LOW (ref 4.6–6.2)
RBC # FLD: 16.3 % — HIGH (ref 11–15.6)
RBC BLD AUTO: ABNORMAL
SODIUM SERPL-SCNC: 145 MMOL/L — SIGNIFICANT CHANGE UP (ref 135–145)
VARIANT LYMPHS # BLD: 1 % — SIGNIFICANT CHANGE UP (ref 0–6)
WBC # BLD: 5 K/UL — SIGNIFICANT CHANGE UP (ref 4.8–10.8)
WBC # FLD AUTO: 5 K/UL — SIGNIFICANT CHANGE UP (ref 4.8–10.8)

## 2018-08-03 PROCEDURE — 99233 SBSQ HOSP IP/OBS HIGH 50: CPT

## 2018-08-03 RX ORDER — VALPROIC ACID (AS SODIUM SALT) 250 MG/5ML
10 SOLUTION, ORAL ORAL
Qty: 0 | Refills: 0 | COMMUNITY
Start: 2018-08-03

## 2018-08-03 RX ORDER — HALOPERIDOL DECANOATE 100 MG/ML
10 INJECTION INTRAMUSCULAR EVERY 6 HOURS
Qty: 0 | Refills: 0 | Status: COMPLETED | OUTPATIENT
Start: 2018-08-03 | End: 2018-08-04

## 2018-08-03 RX ORDER — TIOTROPIUM BROMIDE 18 UG/1
1 CAPSULE ORAL; RESPIRATORY (INHALATION)
Qty: 0 | Refills: 0 | COMMUNITY
Start: 2018-08-03

## 2018-08-03 RX ORDER — LANOLIN ALCOHOL/MO/W.PET/CERES
5 CREAM (GRAM) TOPICAL AT BEDTIME
Qty: 0 | Refills: 0 | Status: DISCONTINUED | OUTPATIENT
Start: 2018-08-03 | End: 2018-08-06

## 2018-08-03 RX ORDER — HALOPERIDOL DECANOATE 100 MG/ML
10 INJECTION INTRAMUSCULAR EVERY 6 HOURS
Qty: 0 | Refills: 0 | Status: COMPLETED | OUTPATIENT
Start: 2018-08-03 | End: 2018-08-03

## 2018-08-03 RX ORDER — VALPROIC ACID (AS SODIUM SALT) 250 MG/5ML
500 SOLUTION, ORAL ORAL EVERY 8 HOURS
Qty: 0 | Refills: 0 | Status: DISCONTINUED | OUTPATIENT
Start: 2018-08-03 | End: 2018-08-06

## 2018-08-03 RX ADMIN — ENOXAPARIN SODIUM 30 MILLIGRAM(S): 100 INJECTION SUBCUTANEOUS at 05:32

## 2018-08-03 RX ADMIN — TAMSULOSIN HYDROCHLORIDE 0.4 MILLIGRAM(S): 0.4 CAPSULE ORAL at 22:26

## 2018-08-03 RX ADMIN — Medication 2 DROP(S): at 08:55

## 2018-08-03 RX ADMIN — Medication 2 DROP(S): at 00:04

## 2018-08-03 RX ADMIN — ENOXAPARIN SODIUM 30 MILLIGRAM(S): 100 INJECTION SUBCUTANEOUS at 17:32

## 2018-08-03 RX ADMIN — CHLORHEXIDINE GLUCONATE 15 MILLILITER(S): 213 SOLUTION TOPICAL at 05:31

## 2018-08-03 RX ADMIN — GABAPENTIN 300 MILLIGRAM(S): 400 CAPSULE ORAL at 05:31

## 2018-08-03 RX ADMIN — Medication 500 MILLIGRAM(S): at 23:05

## 2018-08-03 RX ADMIN — GABAPENTIN 300 MILLIGRAM(S): 400 CAPSULE ORAL at 22:24

## 2018-08-03 RX ADMIN — Medication 500 MILLIGRAM(S): at 13:16

## 2018-08-03 RX ADMIN — Medication 2 DROP(S): at 11:34

## 2018-08-03 RX ADMIN — Medication 2 DROP(S): at 23:06

## 2018-08-03 RX ADMIN — Medication 2 DROP(S): at 06:46

## 2018-08-03 RX ADMIN — Medication 110 MILLIGRAM(S): at 02:14

## 2018-08-03 RX ADMIN — HALOPERIDOL DECANOATE 10 MILLIGRAM(S): 100 INJECTION INTRAMUSCULAR at 10:19

## 2018-08-03 RX ADMIN — Medication 50 MICROGRAM(S): at 05:31

## 2018-08-03 RX ADMIN — CHLORHEXIDINE GLUCONATE 1 APPLICATION(S): 213 SOLUTION TOPICAL at 13:56

## 2018-08-03 RX ADMIN — Medication 2 DROP(S): at 22:28

## 2018-08-03 RX ADMIN — Medication 110 MILLIGRAM(S): at 14:24

## 2018-08-03 RX ADMIN — GABAPENTIN 300 MILLIGRAM(S): 400 CAPSULE ORAL at 13:16

## 2018-08-03 RX ADMIN — Medication 2 DROP(S): at 02:59

## 2018-08-03 RX ADMIN — Medication 5 MILLIGRAM(S): at 23:05

## 2018-08-03 RX ADMIN — CHLORHEXIDINE GLUCONATE 15 MILLILITER(S): 213 SOLUTION TOPICAL at 17:32

## 2018-08-03 RX ADMIN — Medication 3 MILLILITER(S): at 15:44

## 2018-08-03 RX ADMIN — Medication 2 DROP(S): at 14:32

## 2018-08-03 RX ADMIN — Medication 500 MILLIGRAM(S): at 05:31

## 2018-08-03 RX ADMIN — Medication 2 DROP(S): at 18:01

## 2018-08-03 NOTE — PROGRESS NOTE ADULT - PROBLEM SELECTOR PLAN 1
continued improvement. Continue enteral feeds with free water via PEG. Cellulitis improving around PEG site. Will d/c on doxycycline suspension x 10 day course. OOBTC. Follow up Neurosurgery as outpatient. Patient stable for discharge this day.

## 2018-08-03 NOTE — CHART NOTE - NSCHARTNOTEFT_GEN_A_CORE
Source: Patient [ ]  Family [ ]   other [X] EMR reviewed.    Current Diet: NPO with enteral feeds.    Enteral /Parenteral Nutrition: Pivot 1.5cal 320ml every 8 hours via PEG.    Current Weight: (7/26) 62.3kg (8/1) 68.8kg    Pertinent Medications: MEDICATIONS  (STANDING):  ALBUTerol/ipratropium for Nebulization 3 milliLiter(s) Nebulizer every 6 hours  artificial tears (preservative free) Ophthalmic Solution 2 Drop(s) Right EYE every 3 hours  chlorhexidine 0.12% Liquid 15 milliLiter(s) Swish and Spit two times a day  chlorhexidine 2% Cloths 1 Application(s) Topical daily  doxycycline IVPB 100 milliGRAM(s) IV Intermittent every 12 hours  doxycycline monohydrate Suspension 100 milliGRAM(s) Enteral Tube every 12 hours  enoxaparin Injectable 30 milliGRAM(s) SubCutaneous every 12 hours  gabapentin 300 milliGRAM(s) Oral three times a day  levothyroxine 50 MICROGram(s) Oral daily  tamsulosin 0.4 milliGRAM(s) Oral at bedtime  tiotropium 18 MICROgram(s) Capsule 1 Capsule(s) Inhalation daily  valproic  acid Syrup 500 milliGRAM(s) Oral two times a day    MEDICATIONS  (PRN):  haloperidol    Injectable 10 milliGRAM(s) IntraMuscular every 6 hours PRN Agitation  HYDROmorphone  Injectable 0.5 milliGRAM(s) IV Push every 4 hours PRN Severe Pain (7 - 10)    Pertinent Labs: CBC Full  -  ( 03 Aug 2018 06:49 )  WBC Count : 5.0 K/uL  Hemoglobin : 8.7 g/dL  Hematocrit : 27.5 %    Skin: Cellulitis around PEG site.    Nutrition focused physical exam conducted - found signs of malnutrition [X ]absent [ ]present    Subcutaneous fat loss: [ ] Orbital fat pads region, [ ]Buccal fat region, [ ]Triceps region,  [ ]Ribs region    Muscle wasting: [ ]Temples region, [ ]Clavicle region, [ ]Shoulder region, [ ]Scapula region, [ ]Interosseous region,  [ ]thigh region, [ ]Calf region    Estimated Needs:   [ ] no change since previous assessment  [ ] recalculated:     Current Nutrition Diagnosis: Pt at nutritional risk secondary to increased nutrient needs related to s/p L frontal SDH with small B/L hemorrhagic contusions and cellulititis around PEG site as evidenced by increased estimated nutrient needs to facilitate healing. Pt has been tolerating enteral feedings well.     Recommendations:   300ml Pivot 1.5cal every 6 hours (1200ml/daily) via PEG to provide 1776 kcal and 108 grams protein daily.    Monitoring and Evaluation:   [ ] PO intake [ X] Tolerance to enteral feeds [X] Weights  [X] Follow up per protocol [X] Labs

## 2018-08-04 LAB
ANION GAP SERPL CALC-SCNC: 11 MMOL/L — SIGNIFICANT CHANGE UP (ref 5–17)
BASOPHILS # BLD AUTO: 0 K/UL — SIGNIFICANT CHANGE UP (ref 0–0.2)
BASOPHILS NFR BLD AUTO: 0.2 % — SIGNIFICANT CHANGE UP (ref 0–2)
BUN SERPL-MCNC: 15 MG/DL — SIGNIFICANT CHANGE UP (ref 8–20)
CALCIUM SERPL-MCNC: 8.8 MG/DL — SIGNIFICANT CHANGE UP (ref 8.6–10.2)
CHLORIDE SERPL-SCNC: 112 MMOL/L — HIGH (ref 98–107)
CO2 SERPL-SCNC: 25 MMOL/L — SIGNIFICANT CHANGE UP (ref 22–29)
CREAT SERPL-MCNC: 0.57 MG/DL — SIGNIFICANT CHANGE UP (ref 0.5–1.3)
EOSINOPHIL # BLD AUTO: 0.1 K/UL — SIGNIFICANT CHANGE UP (ref 0–0.5)
EOSINOPHIL NFR BLD AUTO: 1.3 % — SIGNIFICANT CHANGE UP (ref 0–5)
GLUCOSE SERPL-MCNC: 102 MG/DL — SIGNIFICANT CHANGE UP (ref 70–115)
HCT VFR BLD CALC: 27.4 % — LOW (ref 42–52)
HGB BLD-MCNC: 8.7 G/DL — LOW (ref 14–18)
LYMPHOCYTES # BLD AUTO: 1.1 K/UL — SIGNIFICANT CHANGE UP (ref 1–4.8)
LYMPHOCYTES # BLD AUTO: 23.7 % — SIGNIFICANT CHANGE UP (ref 20–55)
MAGNESIUM SERPL-MCNC: 1.8 MG/DL — SIGNIFICANT CHANGE UP (ref 1.8–2.6)
MCHC RBC-ENTMCNC: 29.9 PG — SIGNIFICANT CHANGE UP (ref 27–31)
MCHC RBC-ENTMCNC: 31.8 G/DL — LOW (ref 32–36)
MCV RBC AUTO: 94.2 FL — HIGH (ref 80–94)
MONOCYTES # BLD AUTO: 0.5 K/UL — SIGNIFICANT CHANGE UP (ref 0–0.8)
MONOCYTES NFR BLD AUTO: 9.9 % — SIGNIFICANT CHANGE UP (ref 3–10)
NEUTROPHILS # BLD AUTO: 3 K/UL — SIGNIFICANT CHANGE UP (ref 1.8–8)
NEUTROPHILS NFR BLD AUTO: 63.8 % — SIGNIFICANT CHANGE UP (ref 37–73)
PHOSPHATE SERPL-MCNC: 3.4 MG/DL — SIGNIFICANT CHANGE UP (ref 2.4–4.7)
PLATELET # BLD AUTO: 239 K/UL — SIGNIFICANT CHANGE UP (ref 150–400)
POTASSIUM SERPL-MCNC: 3.7 MMOL/L — SIGNIFICANT CHANGE UP (ref 3.5–5.3)
POTASSIUM SERPL-SCNC: 3.7 MMOL/L — SIGNIFICANT CHANGE UP (ref 3.5–5.3)
RBC # BLD: 2.91 M/UL — LOW (ref 4.6–6.2)
RBC # FLD: 16.3 % — HIGH (ref 11–15.6)
SODIUM SERPL-SCNC: 148 MMOL/L — HIGH (ref 135–145)
WBC # BLD: 4.7 K/UL — LOW (ref 4.8–10.8)
WBC # FLD AUTO: 4.7 K/UL — LOW (ref 4.8–10.8)

## 2018-08-04 RX ORDER — POTASSIUM CHLORIDE 20 MEQ
10 PACKET (EA) ORAL
Qty: 0 | Refills: 0 | Status: COMPLETED | OUTPATIENT
Start: 2018-08-04 | End: 2018-08-04

## 2018-08-04 RX ORDER — MAGNESIUM SULFATE 500 MG/ML
2 VIAL (ML) INJECTION ONCE
Qty: 0 | Refills: 0 | Status: COMPLETED | OUTPATIENT
Start: 2018-08-04 | End: 2018-08-04

## 2018-08-04 RX ADMIN — Medication 110 MILLIGRAM(S): at 18:39

## 2018-08-04 RX ADMIN — Medication 2 DROP(S): at 02:19

## 2018-08-04 RX ADMIN — Medication 100 MILLIEQUIVALENT(S): at 13:11

## 2018-08-04 RX ADMIN — Medication 100 MILLIEQUIVALENT(S): at 18:20

## 2018-08-04 RX ADMIN — Medication 2 DROP(S): at 21:07

## 2018-08-04 RX ADMIN — Medication 110 MILLIGRAM(S): at 02:19

## 2018-08-04 RX ADMIN — Medication 2 DROP(S): at 18:35

## 2018-08-04 RX ADMIN — Medication 500 MILLIGRAM(S): at 05:49

## 2018-08-04 RX ADMIN — Medication 500 MILLIGRAM(S): at 22:47

## 2018-08-04 RX ADMIN — Medication 5 MILLIGRAM(S): at 22:46

## 2018-08-04 RX ADMIN — GABAPENTIN 300 MILLIGRAM(S): 400 CAPSULE ORAL at 13:17

## 2018-08-04 RX ADMIN — Medication 2 DROP(S): at 05:49

## 2018-08-04 RX ADMIN — CHLORHEXIDINE GLUCONATE 15 MILLILITER(S): 213 SOLUTION TOPICAL at 05:48

## 2018-08-04 RX ADMIN — Medication 50 MICROGRAM(S): at 05:41

## 2018-08-04 RX ADMIN — Medication 100 MILLIEQUIVALENT(S): at 16:06

## 2018-08-04 RX ADMIN — ENOXAPARIN SODIUM 30 MILLIGRAM(S): 100 INJECTION SUBCUTANEOUS at 05:48

## 2018-08-04 RX ADMIN — Medication 2 DROP(S): at 16:07

## 2018-08-04 RX ADMIN — GABAPENTIN 300 MILLIGRAM(S): 400 CAPSULE ORAL at 22:46

## 2018-08-04 RX ADMIN — ENOXAPARIN SODIUM 30 MILLIGRAM(S): 100 INJECTION SUBCUTANEOUS at 18:39

## 2018-08-04 RX ADMIN — Medication 50 GRAM(S): at 13:11

## 2018-08-04 RX ADMIN — HALOPERIDOL DECANOATE 10 MILLIGRAM(S): 100 INJECTION INTRAMUSCULAR at 01:39

## 2018-08-04 RX ADMIN — TAMSULOSIN HYDROCHLORIDE 0.4 MILLIGRAM(S): 0.4 CAPSULE ORAL at 22:49

## 2018-08-04 RX ADMIN — Medication 500 MILLIGRAM(S): at 13:17

## 2018-08-04 RX ADMIN — GABAPENTIN 300 MILLIGRAM(S): 400 CAPSULE ORAL at 05:49

## 2018-08-04 RX ADMIN — Medication 2 DROP(S): at 13:17

## 2018-08-04 RX ADMIN — CHLORHEXIDINE GLUCONATE 15 MILLILITER(S): 213 SOLUTION TOPICAL at 18:39

## 2018-08-04 RX ADMIN — CHLORHEXIDINE GLUCONATE 1 APPLICATION(S): 213 SOLUTION TOPICAL at 13:16

## 2018-08-04 NOTE — PROGRESS NOTE ADULT - ASSESSMENT
52-year-old male s/p fall with Left subdural hematoma and interval development of cerebral contusions. Hemodynamically stable. Na: 149 - improving. Increasing cellulitis around PEG site.    Plan:  - Continue 10 day abx treat with Doxycycline  - continue tube feeds  - pain control  - Increase FW flushes 250 q6hrs, recheck AM  - start doxy, 10 days for cellulitis to also cover MRSA when going back to group home  - dispo: PT recs: group home with PT

## 2018-08-05 LAB
ANION GAP SERPL CALC-SCNC: 12 MMOL/L — SIGNIFICANT CHANGE UP (ref 5–17)
BASOPHILS # BLD AUTO: 0 K/UL — SIGNIFICANT CHANGE UP (ref 0–0.2)
BASOPHILS NFR BLD AUTO: 0.9 % — SIGNIFICANT CHANGE UP (ref 0–2)
BUN SERPL-MCNC: 15 MG/DL — SIGNIFICANT CHANGE UP (ref 8–20)
CALCIUM SERPL-MCNC: 8.9 MG/DL — SIGNIFICANT CHANGE UP (ref 8.6–10.2)
CHLORIDE SERPL-SCNC: 110 MMOL/L — HIGH (ref 98–107)
CO2 SERPL-SCNC: 24 MMOL/L — SIGNIFICANT CHANGE UP (ref 22–29)
CREAT SERPL-MCNC: 0.6 MG/DL — SIGNIFICANT CHANGE UP (ref 0.5–1.3)
EOSINOPHIL # BLD AUTO: 0 K/UL — SIGNIFICANT CHANGE UP (ref 0–0.5)
EOSINOPHIL NFR BLD AUTO: 1.1 % — SIGNIFICANT CHANGE UP (ref 0–6)
GLUCOSE SERPL-MCNC: 93 MG/DL — SIGNIFICANT CHANGE UP (ref 70–115)
HCT VFR BLD CALC: 28.5 % — LOW (ref 42–52)
HGB BLD-MCNC: 9 G/DL — LOW (ref 14–18)
LYMPHOCYTES # BLD AUTO: 1 K/UL — SIGNIFICANT CHANGE UP (ref 1–4.8)
LYMPHOCYTES # BLD AUTO: 22.5 % — SIGNIFICANT CHANGE UP (ref 20–55)
MAGNESIUM SERPL-MCNC: 1.9 MG/DL — SIGNIFICANT CHANGE UP (ref 1.8–2.6)
MCHC RBC-ENTMCNC: 30.2 PG — SIGNIFICANT CHANGE UP (ref 27–31)
MCHC RBC-ENTMCNC: 31.6 G/DL — LOW (ref 32–36)
MCV RBC AUTO: 95.6 FL — HIGH (ref 80–94)
MONOCYTES # BLD AUTO: 0.4 K/UL — SIGNIFICANT CHANGE UP (ref 0–0.8)
MONOCYTES NFR BLD AUTO: 9.4 % — SIGNIFICANT CHANGE UP (ref 3–10)
NEUTROPHILS # BLD AUTO: 3 K/UL — SIGNIFICANT CHANGE UP (ref 1.8–8)
NEUTROPHILS NFR BLD AUTO: 65 % — SIGNIFICANT CHANGE UP (ref 37–73)
PHOSPHATE SERPL-MCNC: 3.3 MG/DL — SIGNIFICANT CHANGE UP (ref 2.4–4.7)
PLATELET # BLD AUTO: 331 K/UL — SIGNIFICANT CHANGE UP (ref 150–400)
POTASSIUM SERPL-MCNC: 3.5 MMOL/L — SIGNIFICANT CHANGE UP (ref 3.5–5.3)
POTASSIUM SERPL-SCNC: 3.5 MMOL/L — SIGNIFICANT CHANGE UP (ref 3.5–5.3)
RBC # BLD: 2.98 M/UL — LOW (ref 4.6–6.2)
RBC # FLD: 16.3 % — HIGH (ref 11–15.6)
SODIUM SERPL-SCNC: 146 MMOL/L — HIGH (ref 135–145)
WBC # BLD: 4.6 K/UL — LOW (ref 4.8–10.8)
WBC # FLD AUTO: 4.6 K/UL — LOW (ref 4.8–10.8)

## 2018-08-05 RX ADMIN — Medication 2 DROP(S): at 03:03

## 2018-08-05 RX ADMIN — Medication 50 MICROGRAM(S): at 07:05

## 2018-08-05 RX ADMIN — Medication 110 MILLIGRAM(S): at 18:04

## 2018-08-05 RX ADMIN — CHLORHEXIDINE GLUCONATE 15 MILLILITER(S): 213 SOLUTION TOPICAL at 07:04

## 2018-08-05 RX ADMIN — Medication 2 DROP(S): at 00:21

## 2018-08-05 RX ADMIN — CHLORHEXIDINE GLUCONATE 15 MILLILITER(S): 213 SOLUTION TOPICAL at 18:05

## 2018-08-05 RX ADMIN — Medication 2 DROP(S): at 22:50

## 2018-08-05 RX ADMIN — Medication 500 MILLIGRAM(S): at 23:22

## 2018-08-05 RX ADMIN — Medication 2 DROP(S): at 12:33

## 2018-08-05 RX ADMIN — ENOXAPARIN SODIUM 30 MILLIGRAM(S): 100 INJECTION SUBCUTANEOUS at 18:05

## 2018-08-05 RX ADMIN — Medication 2 DROP(S): at 18:05

## 2018-08-05 RX ADMIN — Medication 2 DROP(S): at 15:27

## 2018-08-05 RX ADMIN — GABAPENTIN 300 MILLIGRAM(S): 400 CAPSULE ORAL at 22:53

## 2018-08-05 RX ADMIN — Medication 2 DROP(S): at 09:32

## 2018-08-05 RX ADMIN — GABAPENTIN 300 MILLIGRAM(S): 400 CAPSULE ORAL at 15:26

## 2018-08-05 RX ADMIN — ENOXAPARIN SODIUM 30 MILLIGRAM(S): 100 INJECTION SUBCUTANEOUS at 07:04

## 2018-08-05 RX ADMIN — CHLORHEXIDINE GLUCONATE 1 APPLICATION(S): 213 SOLUTION TOPICAL at 12:34

## 2018-08-05 RX ADMIN — Medication 500 MILLIGRAM(S): at 15:26

## 2018-08-05 RX ADMIN — Medication 500 MILLIGRAM(S): at 07:06

## 2018-08-05 RX ADMIN — GABAPENTIN 300 MILLIGRAM(S): 400 CAPSULE ORAL at 07:04

## 2018-08-05 RX ADMIN — TAMSULOSIN HYDROCHLORIDE 0.4 MILLIGRAM(S): 0.4 CAPSULE ORAL at 22:53

## 2018-08-05 RX ADMIN — Medication 110 MILLIGRAM(S): at 07:04

## 2018-08-05 RX ADMIN — Medication 5 MILLIGRAM(S): at 22:53

## 2018-08-05 RX ADMIN — Medication 2 DROP(S): at 07:05

## 2018-08-05 NOTE — PROGRESS NOTE ADULT - ASSESSMENT
52-year-old male s/p fall with Left subdural hematoma and interval development of cerebral contusions. Hemodynamically stable. Hypernatremia has improved. Hand mitts were ordered for damage to self.     Plan:  - pain control  - monitor sodium  - Continue Doxi.   - dispo: PT recs: group home with PT

## 2018-08-06 ENCOUNTER — INPATIENT (INPATIENT)
Facility: HOSPITAL | Age: 53
LOS: 8 days | Discharge: HOME | DRG: 949 | End: 2018-08-15
Attending: STUDENT IN AN ORGANIZED HEALTH CARE EDUCATION/TRAINING PROGRAM | Admitting: STUDENT IN AN ORGANIZED HEALTH CARE EDUCATION/TRAINING PROGRAM
Payer: MEDICARE

## 2018-08-06 VITALS
HEART RATE: 66 BPM | TEMPERATURE: 98 F | WEIGHT: 132.06 LBS | SYSTOLIC BLOOD PRESSURE: 100 MMHG | OXYGEN SATURATION: 100 % | RESPIRATION RATE: 16 BRPM | DIASTOLIC BLOOD PRESSURE: 67 MMHG | HEIGHT: 67 IN

## 2018-08-06 VITALS
SYSTOLIC BLOOD PRESSURE: 122 MMHG | DIASTOLIC BLOOD PRESSURE: 78 MMHG | HEART RATE: 69 BPM | RESPIRATION RATE: 19 BRPM | TEMPERATURE: 98 F

## 2018-08-06 DIAGNOSIS — S06.9X9A UNSPECIFIED INTRACRANIAL INJURY WITH LOSS OF CONSCIOUSNESS OF UNSPECIFIED DURATION, INITIAL ENCOUNTER: ICD-10-CM

## 2018-08-06 DIAGNOSIS — R56.9 UNSPECIFIED CONVULSIONS: ICD-10-CM

## 2018-08-06 DIAGNOSIS — L89.002: ICD-10-CM

## 2018-08-06 DIAGNOSIS — L25.9 UNSPECIFIED CONTACT DERMATITIS, UNSPECIFIED CAUSE: ICD-10-CM

## 2018-08-06 DIAGNOSIS — Z98.890 OTHER SPECIFIED POSTPROCEDURAL STATES: Chronic | ICD-10-CM

## 2018-08-06 DIAGNOSIS — F79 UNSPECIFIED INTELLECTUAL DISABILITIES: ICD-10-CM

## 2018-08-06 DIAGNOSIS — S06.5X9A TRAUMATIC SUBDURAL HEMORRHAGE WITH LOSS OF CONSCIOUSNESS OF UNSPECIFIED DURATION, INITIAL ENCOUNTER: ICD-10-CM

## 2018-08-06 DIAGNOSIS — L03.90 CELLULITIS, UNSPECIFIED: ICD-10-CM

## 2018-08-06 PROBLEM — H40.9 UNSPECIFIED GLAUCOMA: Chronic | Status: ACTIVE | Noted: 2018-07-26

## 2018-08-06 LAB
ALBUMIN SERPL ELPH-MCNC: 2.9 G/DL — LOW (ref 3.3–5)
ALP SERPL-CCNC: 102 U/L — SIGNIFICANT CHANGE UP (ref 40–120)
ALT FLD-CCNC: 45 U/L DA — SIGNIFICANT CHANGE UP (ref 10–45)
ANION GAP SERPL CALC-SCNC: 5 MMOL/L — SIGNIFICANT CHANGE UP (ref 5–17)
AST SERPL-CCNC: 44 U/L — HIGH (ref 10–40)
BASOPHILS # BLD AUTO: 0.1 K/UL — SIGNIFICANT CHANGE UP (ref 0–0.2)
BASOPHILS NFR BLD AUTO: 1.5 % — SIGNIFICANT CHANGE UP (ref 0–2)
BILIRUB SERPL-MCNC: 0.4 MG/DL — SIGNIFICANT CHANGE UP (ref 0.2–1.2)
BUN SERPL-MCNC: 22 MG/DL — SIGNIFICANT CHANGE UP (ref 7–23)
CALCIUM SERPL-MCNC: 8.7 MG/DL — SIGNIFICANT CHANGE UP (ref 8.4–10.5)
CHLORIDE SERPL-SCNC: 109 MMOL/L — HIGH (ref 96–108)
CO2 SERPL-SCNC: 29 MMOL/L — SIGNIFICANT CHANGE UP (ref 22–31)
CREAT SERPL-MCNC: 0.64 MG/DL — SIGNIFICANT CHANGE UP (ref 0.5–1.3)
EOSINOPHIL # BLD AUTO: 0.1 K/UL — SIGNIFICANT CHANGE UP (ref 0–0.5)
EOSINOPHIL NFR BLD AUTO: 1.2 % — SIGNIFICANT CHANGE UP (ref 0–6)
GLUCOSE SERPL-MCNC: 118 MG/DL — HIGH (ref 70–99)
HCT VFR BLD CALC: 34.4 % — LOW (ref 39–50)
HGB BLD-MCNC: 11.1 G/DL — LOW (ref 13–17)
LYMPHOCYTES # BLD AUTO: 1.3 K/UL — SIGNIFICANT CHANGE UP (ref 1–3.3)
LYMPHOCYTES # BLD AUTO: 27.3 % — SIGNIFICANT CHANGE UP (ref 13–44)
MCHC RBC-ENTMCNC: 30.7 PG — SIGNIFICANT CHANGE UP (ref 27–34)
MCHC RBC-ENTMCNC: 32.2 GM/DL — SIGNIFICANT CHANGE UP (ref 32–36)
MCV RBC AUTO: 95.4 FL — SIGNIFICANT CHANGE UP (ref 80–100)
MONOCYTES # BLD AUTO: 0.5 K/UL — SIGNIFICANT CHANGE UP (ref 0–0.9)
MONOCYTES NFR BLD AUTO: 10.2 % — HIGH (ref 1–9)
NEUTROPHILS # BLD AUTO: 2.8 K/UL — SIGNIFICANT CHANGE UP (ref 1.8–7.4)
NEUTROPHILS NFR BLD AUTO: 59.8 % — SIGNIFICANT CHANGE UP (ref 43–77)
PLATELET # BLD AUTO: 328 K/UL — SIGNIFICANT CHANGE UP (ref 150–400)
POTASSIUM SERPL-MCNC: 3.7 MMOL/L — SIGNIFICANT CHANGE UP (ref 3.5–5.3)
POTASSIUM SERPL-SCNC: 3.7 MMOL/L — SIGNIFICANT CHANGE UP (ref 3.5–5.3)
PROT SERPL-MCNC: 7.3 G/DL — SIGNIFICANT CHANGE UP (ref 6–8.3)
RBC # BLD: 3.6 M/UL — LOW (ref 4.2–5.8)
RBC # FLD: 15.6 % — HIGH (ref 10.3–14.5)
SODIUM SERPL-SCNC: 143 MMOL/L — SIGNIFICANT CHANGE UP (ref 135–145)
WBC # BLD: 4.6 K/UL — SIGNIFICANT CHANGE UP (ref 3.8–10.5)
WBC # FLD AUTO: 4.6 K/UL — SIGNIFICANT CHANGE UP (ref 3.8–10.5)

## 2018-08-06 PROCEDURE — 36600 WITHDRAWAL OF ARTERIAL BLOOD: CPT

## 2018-08-06 PROCEDURE — 74018 RADEX ABDOMEN 1 VIEW: CPT

## 2018-08-06 PROCEDURE — 84484 ASSAY OF TROPONIN QUANT: CPT

## 2018-08-06 PROCEDURE — 84295 ASSAY OF SERUM SODIUM: CPT

## 2018-08-06 PROCEDURE — 94640 AIRWAY INHALATION TREATMENT: CPT

## 2018-08-06 PROCEDURE — 84100 ASSAY OF PHOSPHORUS: CPT

## 2018-08-06 PROCEDURE — 97530 THERAPEUTIC ACTIVITIES: CPT

## 2018-08-06 PROCEDURE — 84436 ASSAY OF TOTAL THYROXINE: CPT

## 2018-08-06 PROCEDURE — 99232 SBSQ HOSP IP/OBS MODERATE 35: CPT

## 2018-08-06 PROCEDURE — 71045 X-RAY EXAM CHEST 1 VIEW: CPT

## 2018-08-06 PROCEDURE — 84480 ASSAY TRIIODOTHYRONINE (T3): CPT

## 2018-08-06 PROCEDURE — 81001 URINALYSIS AUTO W/SCOPE: CPT

## 2018-08-06 PROCEDURE — 72125 CT NECK SPINE W/O DYE: CPT

## 2018-08-06 PROCEDURE — 82962 GLUCOSE BLOOD TEST: CPT

## 2018-08-06 PROCEDURE — 80307 DRUG TEST PRSMV CHEM ANLYZR: CPT

## 2018-08-06 PROCEDURE — 83036 HEMOGLOBIN GLYCOSYLATED A1C: CPT

## 2018-08-06 PROCEDURE — 85730 THROMBOPLASTIN TIME PARTIAL: CPT

## 2018-08-06 PROCEDURE — 85027 COMPLETE CBC AUTOMATED: CPT

## 2018-08-06 PROCEDURE — 93005 ELECTROCARDIOGRAM TRACING: CPT

## 2018-08-06 PROCEDURE — 82803 BLOOD GASES ANY COMBINATION: CPT

## 2018-08-06 PROCEDURE — 83690 ASSAY OF LIPASE: CPT

## 2018-08-06 PROCEDURE — 83735 ASSAY OF MAGNESIUM: CPT

## 2018-08-06 PROCEDURE — 94003 VENT MGMT INPAT SUBQ DAY: CPT

## 2018-08-06 PROCEDURE — G0390: CPT

## 2018-08-06 PROCEDURE — 86850 RBC ANTIBODY SCREEN: CPT

## 2018-08-06 PROCEDURE — C8929: CPT

## 2018-08-06 PROCEDURE — 82435 ASSAY OF BLOOD CHLORIDE: CPT

## 2018-08-06 PROCEDURE — 94760 N-INVAS EAR/PLS OXIMETRY 1: CPT

## 2018-08-06 PROCEDURE — 70450 CT HEAD/BRAIN W/O DYE: CPT

## 2018-08-06 PROCEDURE — 80053 COMPREHEN METABOLIC PANEL: CPT

## 2018-08-06 PROCEDURE — 96375 TX/PRO/DX INJ NEW DRUG ADDON: CPT

## 2018-08-06 PROCEDURE — 87040 BLOOD CULTURE FOR BACTERIA: CPT

## 2018-08-06 PROCEDURE — 36415 COLL VENOUS BLD VENIPUNCTURE: CPT

## 2018-08-06 PROCEDURE — 82550 ASSAY OF CK (CPK): CPT

## 2018-08-06 PROCEDURE — 86901 BLOOD TYPING SEROLOGIC RH(D): CPT

## 2018-08-06 PROCEDURE — 74177 CT ABD & PELVIS W/CONTRAST: CPT

## 2018-08-06 PROCEDURE — 97167 OT EVAL HIGH COMPLEX 60 MIN: CPT

## 2018-08-06 PROCEDURE — 84443 ASSAY THYROID STIM HORMONE: CPT

## 2018-08-06 PROCEDURE — 80164 ASSAY DIPROPYLACETIC ACD TOT: CPT

## 2018-08-06 PROCEDURE — 97110 THERAPEUTIC EXERCISES: CPT

## 2018-08-06 PROCEDURE — 96374 THER/PROPH/DIAG INJ IV PUSH: CPT

## 2018-08-06 PROCEDURE — 84132 ASSAY OF SERUM POTASSIUM: CPT

## 2018-08-06 PROCEDURE — 99223 1ST HOSP IP/OBS HIGH 75: CPT

## 2018-08-06 PROCEDURE — 94002 VENT MGMT INPAT INIT DAY: CPT

## 2018-08-06 PROCEDURE — 99291 CRITICAL CARE FIRST HOUR: CPT | Mod: 25

## 2018-08-06 PROCEDURE — 85610 PROTHROMBIN TIME: CPT

## 2018-08-06 PROCEDURE — 97116 GAIT TRAINING THERAPY: CPT

## 2018-08-06 PROCEDURE — 82330 ASSAY OF CALCIUM: CPT

## 2018-08-06 PROCEDURE — 86900 BLOOD TYPING SEROLOGIC ABO: CPT

## 2018-08-06 PROCEDURE — 85014 HEMATOCRIT: CPT

## 2018-08-06 PROCEDURE — 71260 CT THORAX DX C+: CPT

## 2018-08-06 PROCEDURE — 80048 BASIC METABOLIC PNL TOTAL CA: CPT

## 2018-08-06 PROCEDURE — 83605 ASSAY OF LACTIC ACID: CPT

## 2018-08-06 PROCEDURE — 82947 ASSAY GLUCOSE BLOOD QUANT: CPT

## 2018-08-06 RX ORDER — QUETIAPINE FUMARATE 200 MG/1
25 TABLET, FILM COATED ORAL AT BEDTIME
Qty: 0 | Refills: 0 | Status: DISCONTINUED | OUTPATIENT
Start: 2018-08-06 | End: 2018-08-15

## 2018-08-06 RX ORDER — TIOTROPIUM BROMIDE 18 UG/1
1 CAPSULE ORAL; RESPIRATORY (INHALATION) DAILY
Qty: 0 | Refills: 0 | Status: DISCONTINUED | OUTPATIENT
Start: 2018-08-06 | End: 2018-08-08

## 2018-08-06 RX ORDER — NYSTATIN CREAM 100000 [USP'U]/G
1 CREAM TOPICAL
Qty: 0 | Refills: 0 | Status: DISCONTINUED | OUTPATIENT
Start: 2018-08-06 | End: 2018-08-15

## 2018-08-06 RX ORDER — GABAPENTIN 400 MG/1
300 CAPSULE ORAL THREE TIMES A DAY
Qty: 0 | Refills: 0 | Status: DISCONTINUED | OUTPATIENT
Start: 2018-08-06 | End: 2018-08-06

## 2018-08-06 RX ORDER — LEVOTHYROXINE SODIUM 125 MCG
50 TABLET ORAL DAILY
Qty: 0 | Refills: 0 | Status: DISCONTINUED | OUTPATIENT
Start: 2018-08-06 | End: 2018-08-15

## 2018-08-06 RX ORDER — VALPROIC ACID (AS SODIUM SALT) 250 MG/5ML
500 SOLUTION, ORAL ORAL EVERY 8 HOURS
Qty: 0 | Refills: 0 | Status: DISCONTINUED | OUTPATIENT
Start: 2018-08-06 | End: 2018-08-15

## 2018-08-06 RX ORDER — ENOXAPARIN SODIUM 100 MG/ML
40 INJECTION SUBCUTANEOUS DAILY
Qty: 0 | Refills: 0 | Status: DISCONTINUED | OUTPATIENT
Start: 2018-08-07 | End: 2018-08-15

## 2018-08-06 RX ORDER — CHLORHEXIDINE GLUCONATE 213 G/1000ML
15 SOLUTION TOPICAL
Qty: 0 | Refills: 0 | Status: DISCONTINUED | OUTPATIENT
Start: 2018-08-06 | End: 2018-08-15

## 2018-08-06 RX ORDER — PANTOPRAZOLE SODIUM 20 MG/1
40 TABLET, DELAYED RELEASE ORAL DAILY
Qty: 0 | Refills: 0 | Status: DISCONTINUED | OUTPATIENT
Start: 2018-08-06 | End: 2018-08-09

## 2018-08-06 RX ORDER — GABAPENTIN 400 MG/1
300 CAPSULE ORAL THREE TIMES A DAY
Qty: 0 | Refills: 0 | Status: DISCONTINUED | OUTPATIENT
Start: 2018-08-06 | End: 2018-08-15

## 2018-08-06 RX ORDER — ENOXAPARIN SODIUM 100 MG/ML
30 INJECTION SUBCUTANEOUS EVERY 12 HOURS
Qty: 0 | Refills: 0 | Status: DISCONTINUED | OUTPATIENT
Start: 2018-08-06 | End: 2018-08-06

## 2018-08-06 RX ORDER — CLONAZEPAM 1 MG
0.5 TABLET ORAL
Qty: 0 | Refills: 0 | Status: DISCONTINUED | OUTPATIENT
Start: 2018-08-06 | End: 2018-08-13

## 2018-08-06 RX ORDER — TAMSULOSIN HYDROCHLORIDE 0.4 MG/1
0.4 CAPSULE ORAL AT BEDTIME
Qty: 0 | Refills: 0 | Status: DISCONTINUED | OUTPATIENT
Start: 2018-08-06 | End: 2018-08-06

## 2018-08-06 RX ORDER — QUETIAPINE FUMARATE 200 MG/1
50 TABLET, FILM COATED ORAL AT BEDTIME
Qty: 0 | Refills: 0 | Status: DISCONTINUED | OUTPATIENT
Start: 2018-08-06 | End: 2018-08-06

## 2018-08-06 RX ORDER — DOXAZOSIN MESYLATE 4 MG
4 TABLET ORAL AT BEDTIME
Qty: 0 | Refills: 0 | Status: DISCONTINUED | OUTPATIENT
Start: 2018-08-06 | End: 2018-08-15

## 2018-08-06 RX ORDER — CLONAZEPAM 1 MG
2 TABLET ORAL AT BEDTIME
Qty: 0 | Refills: 0 | Status: DISCONTINUED | OUTPATIENT
Start: 2018-08-06 | End: 2018-08-13

## 2018-08-06 RX ADMIN — CHLORHEXIDINE GLUCONATE 15 MILLILITER(S): 213 SOLUTION TOPICAL at 06:56

## 2018-08-06 RX ADMIN — Medication 110 MILLIGRAM(S): at 06:55

## 2018-08-06 RX ADMIN — Medication 1 DROP(S): at 18:30

## 2018-08-06 RX ADMIN — Medication 2 DROP(S): at 02:25

## 2018-08-06 RX ADMIN — Medication 500 MILLIGRAM(S): at 22:52

## 2018-08-06 RX ADMIN — Medication 2 DROP(S): at 04:30

## 2018-08-06 RX ADMIN — Medication 2 DROP(S): at 09:43

## 2018-08-06 RX ADMIN — NYSTATIN CREAM 1 APPLICATION(S): 100000 CREAM TOPICAL at 18:33

## 2018-08-06 RX ADMIN — CHLORHEXIDINE GLUCONATE 1 APPLICATION(S): 213 SOLUTION TOPICAL at 12:31

## 2018-08-06 RX ADMIN — Medication 2 DROP(S): at 13:31

## 2018-08-06 RX ADMIN — Medication 1 DROP(S): at 22:51

## 2018-08-06 RX ADMIN — Medication 500 MILLIGRAM(S): at 13:46

## 2018-08-06 RX ADMIN — Medication 100 MILLIGRAM(S): at 18:30

## 2018-08-06 RX ADMIN — GABAPENTIN 300 MILLIGRAM(S): 400 CAPSULE ORAL at 13:46

## 2018-08-06 RX ADMIN — GABAPENTIN 300 MILLIGRAM(S): 400 CAPSULE ORAL at 22:52

## 2018-08-06 RX ADMIN — Medication 50 MICROGRAM(S): at 06:56

## 2018-08-06 RX ADMIN — QUETIAPINE FUMARATE 25 MILLIGRAM(S): 200 TABLET, FILM COATED ORAL at 22:52

## 2018-08-06 RX ADMIN — Medication 0.5 MILLIGRAM(S): at 18:33

## 2018-08-06 RX ADMIN — Medication 2 DROP(S): at 06:57

## 2018-08-06 RX ADMIN — Medication 4 MILLIGRAM(S): at 22:52

## 2018-08-06 RX ADMIN — ENOXAPARIN SODIUM 30 MILLIGRAM(S): 100 INJECTION SUBCUTANEOUS at 06:55

## 2018-08-06 RX ADMIN — Medication 2 MILLIGRAM(S): at 22:53

## 2018-08-06 RX ADMIN — Medication 500 MILLIGRAM(S): at 06:56

## 2018-08-06 RX ADMIN — GABAPENTIN 300 MILLIGRAM(S): 400 CAPSULE ORAL at 06:56

## 2018-08-06 NOTE — H&P ADULT - NSHPLABSRESULTS_GEN_ALL_CORE
08-05    146<H>  |  110<H>  |  15.0  ----------------------------<  93  3.5   |  24.0  |  0.60    Ca    8.9      05 Aug 2018 08:30  Phos  3.3     08-05  Mg     1.9     08-05                          9.0    4.6   )-----------( 331      ( 05 Aug 2018 08:30 )             28.5

## 2018-08-06 NOTE — H&P ADULT - PROBLEM SELECTOR PLAN 2
Continue depakote 500mg q8hrs. Monitor for seizure actiivty. Continue depakote 500mg q8hrs. Monitor for seizure activity.  Check depakote level in AM.

## 2018-08-06 NOTE — H&P ADULT - PROBLEM SELECTOR PLAN 6
Apply critic-aid daily and elbow pads, reposition q2 hrs Apply hydrogel daily and elbow pads, reposition q2 hrs

## 2018-08-06 NOTE — PROGRESS NOTE ADULT - SUBJECTIVE AND OBJECTIVE BOX
INTERVAL HPI/OVERNIGHT EVENTS:    SUBJECTIVE:  Pt found down in morning next to bed at outside facility. L subdural hemorrhage with midline L->R shift noted. Pt intubated in trauma bay and brought to SICU. Repeat CT planned for 5Pm per neurosurg    Pt seen in SICU on behalf of ACS/Trauma surgery team    MEDICATIONS  (STANDING):  amiodarone Infusion 0.5 mG/Min (16.667 mL/Hr) IV Continuous <Continuous>  chlorhexidine 0.12% Liquid 15 milliLiter(s) Swish and Spit two times a day  chlorhexidine 2% Cloths 1 Application(s) Topical daily  insulin lispro (HumaLOG) corrective regimen sliding scale   SubCutaneous every 6 hours  norepinephrine Infusion 0.05 MICROgram(s)/kG/Min (5.841 mL/Hr) IV Continuous <Continuous>  pantoprazole  Injectable 40 milliGRAM(s) IV Push daily  piperacillin/tazobactam IVPB. 3.375 Gram(s) IV Intermittent every 8 hours  sodium chloride 0.9%. 1000 milliLiter(s) (75 mL/Hr) IV Continuous <Continuous>  valproic  acid Syrup 500 milliGRAM(s) Oral two times a day  vasopressin Infusion 0.04 Unit(s)/Min (2.4 mL/Hr) IV Continuous <Continuous>    MEDICATIONS  (PRN):      Vital Signs Last 24 Hrs  T(C): 36.4 (2018 00:00), Max: 36.5 (2018 20:00)  T(F): 97.5 (2018 00:00), Max: 97.7 (2018 20:00)  HR: 58 (2018 01:59) (44 - 107)  BP: 114/67 (2018 01:00) (71/51 - 132/81)  BP(mean): 86 (2018 01:00) (57 - 102)  RR: 13 (2018 01:00) (11 - 29)  SpO2: 98% (2018 01:59) (97% - 100%)    PE  GCS: E- 1 V- 1T M- 5= 7T  GENERAL: NAD, thin  HEAD:  Atraumatic, normocephalic  EYES: Conjunctiva and sclera clear  NECK: C collar in place  CRANIAL NERVES: R eye sunken/opacified, L pupil 3 mm, round, reactive; did not track examiner; no gross facial asymmetry; gag reflex intact  MOTOR: purposeful to noxious stimuli all extremities  BACK: no palpable step offs, no signs of trauma  CHEST/LUNG: Nonlabored breathing, no respiratory distress. Symmetric chest rise  HEART: +S1/+S2; Regular rate and rhythm  ABDOMEN: + G tube, clean, without erythema/drainage; soft, nontender, nondistended  EXTREMITIES: R medial knee ecchymosis, L foot drop  SKIN: Warm, dry; no rashes or lesions    I&O's Detail    2018 07:01  -  2018 02:46  --------------------------------------------------------  IN:    amiodarone Infusion: 249.9 mL    amiodarone Infusion: 50.1 mL    norepinephrine Infusion: 329 mL    Sodium Chloride 0.9% IV Bolus: 7000 mL    sodium chloride 0.9%.: 1050 mL    Solution: 100 mL    Solution: 300 mL    Solution: 250 mL    Solution: 50 mL    vasopressin Infusion: 24 mL  Total IN: 9403 mL    OUT:    Indwelling Catheter - Urethral: 680 mL    Nasoenteral Tube: 300 mL  Total OUT: 980 mL    Total NET: 8423 mL          LABS:                        10.7   11.9  )-----------( 107      ( 2018 16:18 )             32.7     07-    142  |  115<H>  |  19.0  ----------------------------<  142<H>  4.1   |  18.0<L>  |  0.72    Ca    7.3<L>      2018 00:34  Phos  2.8       Mg     2.5         TPro  6.3<L>  /  Alb  3.2<L>  /  TBili  0.5  /  DBili  x   /  AST  45<H>  /  ALT  48<H>  /  AlkPhos  106  07-26    PT/INR - ( 2018 11:02 )   PT: 12.6 sec;   INR: 1.14 ratio         PTT - ( 2018 11:02 )  PTT:40.5 sec  Urinalysis Basic - ( 2018 14:23 )    Color: Yellow / Appearance: Clear / S.010 / pH: x  Gluc: x / Ketone: Trace  / Bili: Negative / Urobili: Negative mg/dL   Blood: x / Protein: 15 mg/dL / Nitrite: Negative   Leuk Esterase: Negative / RBC: x / WBC x   Sq Epi: x / Non Sq Epi: Occasional / Bacteria: x        RADIOLOGY & ADDITIONAL STUDIES:
52yM was transferred from [facility] [place] on 07-26 after he was found down in his group home intubated and found to have a left EDH and SDH. In ED, GCS=7T.     Imaging showed:  HEAD CT - Acute moderate frontal convexity subdural hemorrhage. Mild left-to-right midline shift..  CAP CT - 9 mm nodule in the right thyroid gland.   Thick walled urinary bladder. No evidence of acute traumatic injury.  C SPINE CT - Moderate spondylosis. No acute fracture dislocation involving the cervical spine.    Repeat CT showed   Evolving small bilateral anterior-inferior frontal lobe hemorrhagic contusions, slightly larger in size. Stable left-sided subdural hemorrhage. Stable thin anterior falx subdural hemorrhage. Thin posterior falx subdural hemorrhage. Mild subarachnoid hemorrhage in the left parietal lobe. Stable left to right midline shift. Trace subarachnoid hemorrhage and intraventricular fossa. Subdural hemorrhage along the left tentorial leaflet.     An additional CT showed  1: Acute subdural hematoma and left frontal region is again seen and slightly smaller in size. 2: Left parietal subarachnoid hemorrhage demonstrates expected which a changes. 3: Stable hemorrhagic contusions in the inferior bifrontal region.    Course complicated by AFIB, now controlled wit amiodarone, hypernatremia, and sepsis now on Zosyn.    Patient has 1:1 at bedside. Follows commands limitedly. Able to repeat some words, but unable to communicate further. Able to state name and state OK to statements.     REVIEW OF SYSTEMS  unable to provide given communication barrier    VITALS  T(C): 36.9 (07-30-18 @ 04:22), Max: 37.6 (07-29-18 @ 16:21)  HR: 56 (07-30-18 @ 04:22) (56 - 78)  BP: 105/54 (07-30-18 @ 04:22) (105/54 - 123/70)  RR: 20 (07-30-18 @ 04:22) (20 - 23)  SpO2: 94% (07-29-18 @ 11:00) (94% - 94%)  Wt(kg): --    PAST MEDICAL & SURGICAL HISTORY  MR (mental retardation)  Glaucoma  Seizures      SOCIAL HISTORY  Unable to obtain    FUNCTIONAL HISTORY  Lives in group home  As per documentation, able to verbalize  Ambulates with assist and RW/WC  Able to perform ADLs with assist    CURRENT FUNCTIONAL STATUS  Total A    FAMILY HISTORY   No pertinent family history in first degree relatives      RECENT LABS/IMAGING  CBC Full  -  ( 29 Jul 2018 08:01 )  WBC Count : 6.8 K/uL  Hemoglobin : 8.8 g/dL  Hematocrit : 27.9 %  Platelet Count - Automated : 77 K/uL  Mean Cell Volume : 93.6 fl  Mean Cell Hemoglobin : 29.5 pg  Mean Cell Hemoglobin Concentration : 31.5 g/dL  Auto Neutrophil # : 5.7 K/uL  Auto Lymphocyte # : 1.0 K/uL  Auto Monocyte # : 0.6 K/uL  Auto Eosinophil # : 0.0 K/uL  Auto Basophil # : 0.0 K/uL  Auto Neutrophil % : 84.0 %  Auto Lymphocyte % : 11.0 %  Auto Monocyte % : 1.0 %  Auto Eosinophil % : 2.0 %  Auto Basophil % : 0.0 %    07-30    152<H>  |  113<H>  |  17.0  ----------------------------<  96  3.8   |  27.0  |  0.65    Ca    8.5<L>      30 Jul 2018 07:12  Phos  3.6     07-30  Mg     1.6     07-30          ALLERGIES  Allergy Status Unknown      MEDICATIONS   ALBUTerol/ipratropium for Nebulization 3 milliLiter(s) Nebulizer every 6 hours  amiodarone    Tablet 200 milliGRAM(s) Oral daily  chlorhexidine 2% Cloths 1 Application(s) Topical daily  dextrose 5% + sodium chloride 0.45%. 1000 milliLiter(s) IV Continuous <Continuous>  enoxaparin Injectable 30 milliGRAM(s) SubCutaneous every 12 hours  HYDROmorphone  Injectable 0.5 milliGRAM(s) IV Push every 4 hours PRN  piperacillin/tazobactam IVPB. 3.375 Gram(s) IV Intermittent every 8 hours  tamsulosin 0.4 milliGRAM(s) Oral at bedtime  tiotropium 18 MICROgram(s) Capsule 1 Capsule(s) Inhalation daily  valproic  acid Syrup 500 milliGRAM(s) Oral two times a day      ----------------------------------------------------------------------------------------  PHYSICAL EXAM  Constitutional - NAD, Comfortable  HEENT - NCAT, EOMI  Neck - Supple, No limited ROM  Chest - Breathing comfortably, No wheezing  Cardiovascular - S1S2   Abdomen - Soft   Extremities - Mild swelling of the hands, No calf tenderness   Neurologic Exam -                    Cognitive - Awake, Alert, AAO - michael to nod yes to correct name and then able to state it      Communication - Dysarthria, Expressive deficits, Able to repeat some words - inconsistently     Motor - Unable to fully assess, spontaneous movement, some to commands, but unable to complete fully - raising arms -- raises just hands, no movement in the BLE to command, but localizes to pain     Balance - WNL Static  Psychiatric - Mood stable, Affect WNL  ----------------------------------------------------------------------------------------  ASSESSMENT/PLAN  52yMale with functional deficits after a suspected fall sustaining a severe TBI (GCS- initial 7, motor best - 5)  Pain - Tylenol, Dilaudid  DVT PPX - SCDs, Lovenox  Mood - Depakote  AFIB - Amiodarone  Rehab - Pending PT/OT eval as well as SLP follow up as patient was not in optimal condition to administer PO. Will FU for rehab dispo recommendations.
HISTORY  52y Male found down with Left subdural hematoma and interval development of cerebral contusions-stable, new onset atrial fibrillation with hypotension s/p chemical cardioversion, shock septic vs cardiogenic    24 HOUR EVENTS: Pt now hemodynamically nl with decreasing pressor requirements, awake and arousable with GCS 11T    SUBJECTIVE/ROS:  [ ] A ten-point review of systems was otherwise negative except as noted.  [X ] Due to altered mental status/intubation, subjective information were not able to be obtained from the patient. History was obtained, to the extent possible, from review of the chart and collateral sources of information.      NEURO  RASS:  -1   GCS:  11T   CAM ICU: N/A  Exam: Nonfocal  Meds: fentaNYL   Infusion. 0.5 MICROgram(s)/kG/Hr IV Continuous <Continuous>  valproic  acid Syrup 500 milliGRAM(s) Oral two times a day    [x] Adequacy of sedation and pain control has been assessed and adjusted      RESPIRATORY  RR: 10 (07-27-18 @ 12:00) (10 - 29)  SpO2: 97% (07-27-18 @ 12:00) (96% - 100%)  Wt(kg): --  Exam: unlabored, clear to auscultation bilaterally  Mechanical Ventilation: Mode: CPAP with PS, RR (patient): 13, FiO2: 40, PEEP: 8, PS: 8, MAP: 11, PIP: 18  ABG - ( 27 Jul 2018 03:46 )  pH: 7.30  /  pCO2: 40    /  pO2: 125   / HCO3: 19    / Base Excess: -6.3  /  SaO2: 100     Lactate: x                [ ] Extubation Readiness Assessed  Meds:       CARDIOVASCULAR  HR: 54 (07-27-18 @ 12:00) (44 - 107)  BP: 91/55 (07-27-18 @ 09:30) (71/51 - 132/81)  BP(mean): 69 (07-27-18 @ 09:30) (57 - 102)  ABP: 84/50 (07-27-18 @ 12:00) (83/56 - 123/65)  ABP(mean): 62 (07-27-18 @ 12:00) (62 - 95)  Wt(kg): --  CVP(cm H2O): --  VBG - ( 26 Jul 2018 11:06 )  pH: x     /  pCO2: x     /  pO2: x     / HCO3: x     / Base Excess: x     /  SaO2: x      Lactate: 1.1                Exam:  Cardiac Rhythm: RRR  Perfusion     [ X]Adequate   [ ]Inadequate  Mentation   [X ]Normal       [ ]Reduced  Extremities  [ X]Warm         [ ]Cool  Volume Status [ ]Hypervolemic [ X]Euvolemic [ ]Hypovolemic  Meds: amiodarone Infusion 0.5 mG/Min IV Continuous <Continuous>  norepinephrine Infusion 0.05 MICROgram(s)/kG/Min IV Continuous <Continuous>        GI/NUTRITION  Exam: soft, ND NTTP  Diet: NPO  Meds: pantoprazole  Injectable 40 milliGRAM(s) IV Push daily      GENITOURINARY  I&O's Detail    07-26 @ 07:01 - 07-27 @ 07:00  --------------------------------------------------------  IN:    amiodarone Infusion: 249.9 mL    amiodarone Infusion: 133.6 mL    norepinephrine Infusion: 434 mL    Sodium Chloride 0.9% IV Bolus: 7000 mL    sodium chloride 0.9%.: 1425 mL    Solution: 100 mL    Solution: 300 mL    Solution: 250 mL    Solution: 100 mL    vasopressin Infusion: 38.4 mL  Total IN: 85369.9 mL    OUT:    Indwelling Catheter - Urethral: 920 mL    Nasoenteral Tube: 500 mL  Total OUT: 1420 mL    Total NET: 8610.9 mL      07-27 @ 07:01 - 07-27 @ 12:27  --------------------------------------------------------  IN:    amiodarone Infusion: 100.2 mL    fentaNYL Infusion.: 6.2 mL    norepinephrine Infusion: 57.2 mL    sodium chloride 0.9%.: 350 mL    Solution: 100 mL    vasopressin Infusion: 7.2 mL  Total IN: 620.8 mL    OUT:    Indwelling Catheter - Urethral: 220 mL  Total OUT: 220 mL    Total NET: 400.8 mL          07-27    143  |  114<H>  |  18.0  ----------------------------<  127<H>  4.4   |  19.0<L>  |  0.75    Ca    7.5<L>      27 Jul 2018 06:43  Phos  3.6     07-27  Mg     2.3     07-27    TPro  6.3<L>  /  Alb  3.2<L>  /  TBili  0.5  /  DBili  x   /  AST  45<H>  /  ALT  48<H>  /  AlkPhos  106  07-26    [ ] Viramontes catheter, indication: N/A  Meds: sodium chloride 0.9%. 1000 milliLiter(s) IV Continuous <Continuous>        HEMATOLOGIC  Meds:   [x] VTE Prophylaxis                        11.2   12.7  )-----------( 122      ( 27 Jul 2018 06:43 )             34.5     PT/INR - ( 26 Jul 2018 11:02 )   PT: 12.6 sec;   INR: 1.14 ratio         PTT - ( 26 Jul 2018 11:02 )  PTT:40.5 sec  Transfusion     [ ] PRBC   [ ] Platelets   [ ] FFP   [ ] Cryoprecipitate      INFECTIOUS DISEASES  T(C): 36.3 (07-27-18 @ 12:00), Max: 36.6 (07-27-18 @ 04:00)  Wt(kg): --  WBC Count: 12.7 K/uL (07-27 @ 06:43)  WBC Count: 11.9 K/uL (07-26 @ 16:18)    Recent Cultures:    Meds: piperacillin/tazobactam IVPB. 3.375 Gram(s) IV Intermittent every 8 hours        ENDOCRINE  Capillary Blood Glucose    Meds: insulin lispro (HumaLOG) corrective regimen sliding scale   SubCutaneous every 6 hours  vasopressin Infusion 0.04 Unit(s)/Min IV Continuous <Continuous>        ACCESS DEVICES:  [X ] Peripheral IV  [X ] Central Venous Line	[ ] R	[ ] L	[ ] IJ	[ ] Fem	[ ] SC	Placed:   [ ] Arterial Line		[ ] R	[ ] L	[ ] Fem	[ ] Rad	[ ] Ax	Placed:   [ ] PICC:					[ ] Mediport  [ ] Urinary Catheter, Date Placed:   [ ] Necessity of urinary, arterial, and venous catheters discussed    OTHER MEDICATIONS:  chlorhexidine 0.12% Liquid 15 milliLiter(s) Swish and Spit two times a day  chlorhexidine 2% Cloths 1 Application(s) Topical daily      CODE STATUS:     IMAGING: Full
HPI/OVERNIGHT EVENTS:  In the early AM, patient pulled his last IV access out. furthermore, nursing stopped his tube feeds due to increased residual. Otherwise, VSS, afebrile. Sodium is 151 today. passed TOV    MEDICATIONS  (STANDING):  ALBUTerol/ipratropium for Nebulization 3 milliLiter(s) Nebulizer every 6 hours  chlorhexidine 0.12% Liquid 15 milliLiter(s) Swish and Spit two times a day  chlorhexidine 2% Cloths 1 Application(s) Topical daily  enoxaparin Injectable 30 milliGRAM(s) SubCutaneous every 12 hours  gabapentin 300 milliGRAM(s) Oral three times a day  levothyroxine 50 MICROGram(s) Oral daily  tamsulosin 0.4 milliGRAM(s) Oral at bedtime  tiotropium 18 MICROgram(s) Capsule 1 Capsule(s) Inhalation daily  valproic  acid Syrup 500 milliGRAM(s) Oral two times a day    MEDICATIONS  (PRN):  HYDROmorphone  Injectable 0.5 milliGRAM(s) IV Push every 4 hours PRN Severe Pain (7 - 10)      Vital Signs Last 24 Hrs  T(C): 37.2 (31 Jul 2018 11:00), Max: 37.4 (30 Jul 2018 16:00)  T(F): 98.9 (31 Jul 2018 11:00), Max: 99.3 (30 Jul 2018 16:00)  HR: 96 (31 Jul 2018 11:00) (59 - 96)  BP: 101/55 (31 Jul 2018 11:00) (101/55 - 112/74)  BP(mean): --  RR: 20 (31 Jul 2018 11:00) (18 - 20)  SpO2: 92% (31 Jul 2018 09:47) (92% - 96%)    gen: wdwn, nad  cv: rrr  resp: unlabored breathing  abd: soft, nd, nttp. slight erythema around peg site.       I&O's Detail    30 Jul 2018 07:01  -  31 Jul 2018 07:00  --------------------------------------------------------  IN:    Enteral Tube Flush: 300 mL    Pivot: 280 mL  Total IN: 580 mL    OUT:    Indwelling Catheter - Urethral: 300 mL  Total OUT: 300 mL    Total NET: 280 mL          LABS:                        9.0    4.2   )-----------( 97       ( 31 Jul 2018 06:58 )             28.7     07-31    151<H>  |  112<H>  |  20.0  ----------------------------<  69<L>  3.8   |  26.0  |  0.69    Ca    8.7      31 Jul 2018 06:58  Phos  3.9     07-31  Mg     1.7     07-31
HPI/OVERNIGHT EVENTS:  No acute events overnight. VSS. Afebrile. Tolerating tube feeds. +flatus/BM.     MEDICATIONS  (STANDING):  ALBUTerol/ipratropium for Nebulization 3 milliLiter(s) Nebulizer every 6 hours  chlorhexidine 0.12% Liquid 15 milliLiter(s) Swish and Spit two times a day  chlorhexidine 2% Cloths 1 Application(s) Topical daily  doxycycline IVPB 100 milliGRAM(s) IV Intermittent once  doxycycline monohydrate Suspension 100 milliGRAM(s) Enteral Tube every 12 hours  enoxaparin Injectable 30 milliGRAM(s) SubCutaneous every 12 hours  gabapentin 300 milliGRAM(s) Oral three times a day  levothyroxine 50 MICROGram(s) Oral daily  magnesium sulfate  IVPB 2 Gram(s) IV Intermittent once  tamsulosin 0.4 milliGRAM(s) Oral at bedtime  tiotropium 18 MICROgram(s) Capsule 1 Capsule(s) Inhalation daily  valproic  acid Syrup 500 milliGRAM(s) Oral two times a day    MEDICATIONS  (PRN):  HYDROmorphone  Injectable 0.5 milliGRAM(s) IV Push every 4 hours PRN Severe Pain (7 - 10)      Vital Signs Last 24 Hrs  T(C): 36.6 (02 Aug 2018 11:00), Max: 36.7 (01 Aug 2018 16:30)  T(F): 97.9 (02 Aug 2018 11:00), Max: 98.1 (01 Aug 2018 16:30)  HR: 65 (02 Aug 2018 11:00) (60 - 69)  BP: 128/73 (02 Aug 2018 11:00) (112/64 - 128/73)  BP(mean): --  RR: 18 (02 Aug 2018 11:00) (17 - 18)  SpO2: 95% (02 Aug 2018 04:48) (91% - 95%)    gen: NAD, seen sitting on chair  cv: rrr  resp: unlabored breathing  abd: soft, nd, nttp. PEG tube in place. increased erythema around the PEG insertion  msk: no c/c/e      I&O's Detail    01 Aug 2018 07:01  -  02 Aug 2018 07:00  --------------------------------------------------------  IN:    Enteral Tube Flush: 1000 mL    Pivot: 280 mL    Pivot: 630 mL  Total IN: 1910 mL    OUT:    Voided: 600 mL  Total OUT: 600 mL    Total NET: 1310 mL      02 Aug 2018 07:01  -  02 Aug 2018 14:20  --------------------------------------------------------  IN:    Enteral Tube Flush: 250 mL    Pivot: 315 mL  Total IN: 565 mL    OUT:  Total OUT: 0 mL    Total NET: 565 mL          LABS:                        8.0    4.6   )-----------( 142      ( 02 Aug 2018 07:04 )             25.4     08-02    149<H>  |  112<H>  |  20.0  ----------------------------<  115  4.1   |  26.0  |  0.56    Ca    8.4<L>      02 Aug 2018 07:04  Phos  3.3     08-02  Mg     1.9     08-02
HPI/OVERNIGHT EVENTS: No acute events overnight. Decraesing celullitis around PEG. Pending dispor for d/c to acute rehab    MEDICATIONS  (STANDING):  ALBUTerol/ipratropium for Nebulization 3 milliLiter(s) Nebulizer every 6 hours  artificial tears (preservative free) Ophthalmic Solution 2 Drop(s) Right EYE every 3 hours  chlorhexidine 0.12% Liquid 15 milliLiter(s) Swish and Spit two times a day  chlorhexidine 2% Cloths 1 Application(s) Topical daily  doxycycline IVPB 100 milliGRAM(s) IV Intermittent every 12 hours  enoxaparin Injectable 30 milliGRAM(s) SubCutaneous every 12 hours  gabapentin 300 milliGRAM(s) Oral three times a day  levothyroxine 50 MICROGram(s) Oral daily  melatonin 5 milliGRAM(s) Oral at bedtime  tamsulosin 0.4 milliGRAM(s) Oral at bedtime  tiotropium 18 MICROgram(s) Capsule 1 Capsule(s) Inhalation daily  valproic  acid Syrup 500 milliGRAM(s) Oral every 8 hours    MEDICATIONS  (PRN):      Vital Signs Last 24 Hrs  T(C): 36.8 (04 Aug 2018 05:34), Max: 36.8 (03 Aug 2018 11:28)  T(F): 98.2 (04 Aug 2018 05:34), Max: 98.3 (03 Aug 2018 11:28)  HR: 67 (04 Aug 2018 05:34) (67 - 70)  BP: 99/67 (04 Aug 2018 05:34) (99/67 - 120/80)  BP(mean): --  RR: 18 (04 Aug 2018 05:34) (18 - 18)  SpO2: 94% (03 Aug 2018 21:00) (94% - 94%)    gen: NAD, seen sitting on chair  cv: rrr  resp: unlabored breathing  abd: soft, nd, nttp. PEG tube in place. increased erythema around the PEG insertion  msk: no c/c/e      I&O's Detail    03 Aug 2018 07:01  -  04 Aug 2018 07:00  --------------------------------------------------------  IN:    Enteral Tube Flush: 500 mL    Pivot: 315 mL  Total IN: 815 mL    OUT:    Voided: 1000 mL  Total OUT: 1000 mL    Total NET: -185 mL          LABS:                        8.7    5.0   )-----------( 179      ( 03 Aug 2018 06:49 )             27.5     08-03    145  |  107  |  18.0  ----------------------------<  93  3.8   |  24.0  |  0.50    Ca    8.7      03 Aug 2018 06:49  Phos  3.2     08-03  Mg     1.9     08-03
HPI/OVERNIGHT EVENTS: Patient seen and examined at bedside this AM. No acute events overnight per nursing reports. Patient passing flatus, having bowel movements. Denies fever, chills, nausea, vomitting, chest pain, SOB, dizziness, abd pain or any other concerning symptoms.    Vital Signs Last 24 Hrs  T(C): 36.7 (29 Jul 2018 04:30), Max: 36.9 (28 Jul 2018 21:00)  T(F): 98.1 (29 Jul 2018 04:30), Max: 98.4 (28 Jul 2018 21:00)  HR: 78 (29 Jul 2018 08:56) (55 - 99)  BP: 112/74 (29 Jul 2018 04:30) (89/50 - 116/72)  BP(mean): 73 (28 Jul 2018 14:00) (64 - 73)  RR: 22 (29 Jul 2018 04:30) (21 - 28)  SpO2: 96% (29 Jul 2018 04:30) (90% - 96%)    I&O's Detail    28 Jul 2018 07:01  -  29 Jul 2018 07:00  --------------------------------------------------------  IN:    Pivot: 760 mL    sodium chloride 0.9%.: 950 mL    Solution: 187.5 mL    Solution: 100 mL  Total IN: 1997.5 mL    OUT:    Indwelling Catheter - Urethral: 340 mL  Total OUT: 340 mL    Total NET: 1657.5 mL      Constitutional: patient resting comfortably in bed, in no acute distress  HEENT: EOMI / PERRL b/l  Neck: No JVD, full ROM without pain   Respiratory: CTAB, respirations are unlabored, no accessory muscle use, no conversational dyspnea  Cardiovascular: regular rate & rhythm   Gastrointestinal: Abdomen soft, non-tender, non-distended, no rebound tenderness / guarding  Musculoskeletal: No joint pain, swelling or deformity     LABS:                        8.9    4.2   )-----------( 81       ( 28 Jul 2018 04:40 )             26.8     07-29    151<H>  |  114<H>  |  18.0  ----------------------------<  92  4.4   |  27.0  |  0.72    Ca    8.4<L>      29 Jul 2018 08:01  Phos  3.0     07-29  Mg     1.9     07-29            MEDICATIONS  (STANDING):  ALBUTerol/ipratropium for Nebulization 3 milliLiter(s) Nebulizer every 6 hours  amiodarone    Tablet 200 milliGRAM(s) Oral daily  chlorhexidine 2% Cloths 1 Application(s) Topical daily  piperacillin/tazobactam IVPB. 3.375 Gram(s) IV Intermittent every 8 hours  sodium chloride 0.9%. 1000 milliLiter(s) (50 mL/Hr) IV Continuous <Continuous>  tamsulosin 0.4 milliGRAM(s) Oral at bedtime  tiotropium 18 MICROgram(s) Capsule 1 Capsule(s) Inhalation daily  valproic  acid Syrup 500 milliGRAM(s) Oral two times a day    MEDICATIONS  (PRN):  HYDROmorphone  Injectable 0.5 milliGRAM(s) IV Push every 4 hours PRN Severe Pain (7 - 10)
INTERVAL EVENTS:  Patient extubated yesterday. Minimally verbal. Follows commands intermittently. Makes needs known. No new complaints.     Vital Signs Last 24 Hrs  T(C): 36 (28 Jul 2018 12:00), Max: 36.1 (27 Jul 2018 16:00)  T(F): 96.8 (28 Jul 2018 12:00), Max: 97 (27 Jul 2018 16:00)  HR: 64 (28 Jul 2018 12:00) (57 - 69)  BP: 102/57 (28 Jul 2018 12:00) (86/53 - 111/56)  BP(mean): 72 (28 Jul 2018 09:00) (65 - 77)  RR: 27 (28 Jul 2018 12:00) (14 - 34)  SpO2: 94% (28 Jul 2018 12:00) (87% - 98%)  PHYSICAL EXAM:  GENERAL: NAD, thin  HEAD:  Atraumatic, normocephalic  EYES: Conjunctiva and sclera clear  NECK: C collar in place  MENTAL STATUS: Lying awake, follows commands intermittently  CRANIAL NERVES: R eye sunken/opacified, L pupil 3 mm, round, reactive; tracking; no gross facial asymmetry  MOTOR: purposeful with BUE  CHEST/LUNG: Nonlabored breathing, no respiratory distress. Symmetric chest rise  HEART: +S1/+S2; Regular rate and rhythm  ABDOMEN: + G tube, clean, without erythema/drainage; soft, nontender, nondistended  EXTREMITIES: R medial knee ecchymosis  SKIN: Warm, dry; no rashes or lesions      LABS:             8.9    4.2   )-----------( 81                   26.8     144  |  112<H>  |  14.0  ----------------------------<  97  3.7   |  23.0  |  0.62    Ca    7.9<L>       Phos  2.3       Mg     2.0           RADIOLOGY & ADDITIONAL TESTS:  CT Head No Cont (07.28.18 @ 11:18)  Impression: 1: Acute subdural hematoma and left frontal region is again   seen and slightly smaller in size.  2: Left parietal subarachnoid hemorrhage demonstrates expected which a   changes.  3: Stable hemorrhagic contusions in the inferior bifrontal region.    CT Head No Cont (07.27.18 @ 04:47)  IMPRESSION:      Evolving small bilateralanterior-inferior frontal lobe   hemorrhagic contusions, slightly larger in size. Stable left-sided   subdural hemorrhage. Stable thin anterior falx subdural hemorrhage. Thin   posterior falx subdural hemorrhage. Mild subarachnoid hemorrhage in the   left parietal lobe. Stable left to right midline shift. Trace   subarachnoid hemorrhage and intraventricular fossa. Subdural hemorrhage   along the left tentorial leaflet.     CT Head No Cont (07.26.18 @ 17:58)  Impression:   Left-sided subdural hematoma demonstrating no change since 7/26/2018   11:16 AM.   Small bilateral hemorrhagic contusions noted at the base of the frontal   lobes medially, not definitely seen on the prior study..
INTERVAL EVENTS:  Patient seen this morning. Appears stable at baseline. Intermittently following commands.    Vital Signs Last 24 Hrs  T(C): 36.2 (27 Jul 2018 08:00), Max: 36.6 (27 Jul 2018 04:00)  T(F): 97.2 (27 Jul 2018 08:00), Max: 97.8 (27 Jul 2018 04:00)  HR: 61 (27 Jul 2018 10:00) (44 - 107)  BP: 91/55 (27 Jul 2018 09:30) (71/51 - 132/81)  BP(mean): 69 (27 Jul 2018 09:30) (57 - 102)  RR: 18 (27 Jul 2018 10:00) (11 - 29)  SpO2: 98% (27 Jul 2018 10:00) (96% - 100%)  PHYSICAL EXAM:  GCS: E-4 V-1T M- 5-6 (intermittent)= 10-11T  GENERAL: NAD, thin  HEAD:  Atraumatic, normocephalic  EYES: Conjunctiva and sclera clear  NECK: C collar in place  MENTAL STATUS: Lying awake, nonverbal, intubated, follows commands intermittent  CRANIAL NERVES: R eye sunken/opacified, L pupil 3 mm, round, reactive; tracking; no gross facial asymmetry; gag reflex intact  MOTOR: purposeful to noxious stimuli all extremities  SENSATION: unable to assess  BACK: no palpable step offs, no signs of trauma  CHEST/LUNG: Nonlabored breathing, no respiratory distress. Symmetric chest rise  HEART: +S1/+S2; Regular rate and rhythm  ABDOMEN: + G tube, clean, without erythema/drainage; soft, nontender, nondistended  EXTREMITIES: R medial knee ecchymosis  SKIN: Warm, dry; no rashes or lesions      LABS:             11.2   12.7  )-----------( 122                 34.5     07-27    143  |  114<H>  |  18.0  ----------------------------<  127<H>  4.4   |  19.0<L>  |  0.75    Ca    7.5<L>       Phos  3.6      Mg     2.3        TPro  6.3<L>  /  Alb  3.2<L>  /  TBili  0.5  /  DBili  x   /  AST  45<H>  /  ALT  48<H>  /  AlkPhos  106        RADIOLOGY & ADDITIONAL TESTS:  CT Head No Cont (07.27.18 @ 04:47)  IMPRESSION:      Evolving small bilateralanterior-inferior frontal lobe   hemorrhagic contusions, slightly larger in size. Stable left-sided   subdural hemorrhage. Stable thin anterior falx subdural hemorrhage. Thin   posterior falx subdural hemorrhage. Mild subarachnoid hemorrhage in the   left parietal lobe. Stable left to right midline shift. Trace   subarachnoid hemorrhage and intraventricular fossa. Subdural hemorrhage   along the left tentorial leaflet.     CT Head No Cont (07.26.18 @ 17:58)  Impression:   Left-sided subdural hematoma demonstrating no change since 7/26/2018   11:16 AM.   Small bilateral hemorrhagic contusions noted at the base of the frontal   lobes medially, not definitely seen on the prior study..
INTERVAL HPI/OVERNIGHT EVENTS/SUBJECTIVE:  Haldol x 1 overnight for agitation. Started on doxycycline for cellulitis around PEG site.     Vital Signs Last 24 Hrs  T(C): 36.6 (03 Aug 2018 04:56), Max: 36.8 (02 Aug 2018 19:59)  T(F): 97.8 (03 Aug 2018 04:56), Max: 98.3 (02 Aug 2018 19:59)  HR: 77 (03 Aug 2018 04:56) (59 - 77)  BP: 125/82 (03 Aug 2018 04:56) (116/71 - 128/73)  BP(mean): --  ABP: --  ABP(mean): --  RR: 20 (03 Aug 2018 04:56) (18 - 20)  SpO2: 98% (03 Aug 2018 04:56) (93% - 98%)      I&O's Detail    02 Aug 2018 07:01  -  03 Aug 2018 07:00  --------------------------------------------------------  IN:    Enteral Tube Flush: 1000 mL    Pivot: 945 mL  Total IN: 1945 mL    OUT:    Voided: 703 mL  Total OUT: 703 mL    Total NET: 1242 mL    MEDICATIONS  (STANDING):  ALBUTerol/ipratropium for Nebulization 3 milliLiter(s) Nebulizer every 6 hours  artificial tears (preservative free) Ophthalmic Solution 2 Drop(s) Right EYE every 3 hours  chlorhexidine 0.12% Liquid 15 milliLiter(s) Swish and Spit two times a day  chlorhexidine 2% Cloths 1 Application(s) Topical daily  doxycycline IVPB 100 milliGRAM(s) IV Intermittent every 12 hours  doxycycline monohydrate Suspension 100 milliGRAM(s) Enteral Tube every 12 hours  enoxaparin Injectable 30 milliGRAM(s) SubCutaneous every 12 hours  gabapentin 300 milliGRAM(s) Oral three times a day  levothyroxine 50 MICROGram(s) Oral daily  tamsulosin 0.4 milliGRAM(s) Oral at bedtime  tiotropium 18 MICROgram(s) Capsule 1 Capsule(s) Inhalation daily  valproic  acid Syrup 500 milliGRAM(s) Oral two times a day    MEDICATIONS  (PRN):  haloperidol    Injectable 10 milliGRAM(s) IntraMuscular every 6 hours PRN Agitation  HYDROmorphone  Injectable 0.5 milliGRAM(s) IV Push every 4 hours PRN Severe Pain (7 - 10)      NUTRITION/IVF: Pivot at goal    PHYSICAL EXAM:    Gen: well appearing male in NAD    Neurological: Alert, RASS 0    Pulmonary: Unlabored    Cardiovascular: NSR    Gastrointestinal: peg in place. cellulitis around peg site improving.     Extremities: no c/c/e    Skin: intact        LABS:  CBC Full  -  ( 03 Aug 2018 06:49 )  WBC Count : 5.0 K/uL  Hemoglobin : 8.7 g/dL  Hematocrit : 27.5 %  Platelet Count - Automated : 179 K/uL  Mean Cell Volume : 94.8 fl  Mean Cell Hemoglobin : 30.0 pg  Mean Cell Hemoglobin Concentration : 31.6 g/dL  Auto Neutrophil # : x  Auto Lymphocyte # : x  Auto Monocyte # : x  Auto Eosinophil # : x  Auto Basophil # : x  Auto Neutrophil % : x  Auto Lymphocyte % : x  Auto Monocyte % : x  Auto Eosinophil % : x  Auto Basophil % : x    08-03    145  |  107  |  18.0  ----------------------------<  93  3.8   |  24.0  |  0.50    Ca    8.7      03 Aug 2018 06:49  Phos  3.2     08-03  Mg     1.9     08-03    RECENT CULTURES:  CAPILLARY BLOOD GLUCOSE      RADIOLOGY & ADDITIONAL STUDIES:    ASSESSMENT/PLAN:
INTERVAL HPI/OVERNIGHT EVENTS: No acute events overnight.  Afebrile, VSS.  Na 150 w/ FW.           MEDICATIONS  (STANDING):  ALBUTerol/ipratropium for Nebulization 3 milliLiter(s) Nebulizer every 6 hours  chlorhexidine 0.12% Liquid 15 milliLiter(s) Swish and Spit two times a day  chlorhexidine 2% Cloths 1 Application(s) Topical daily  enoxaparin Injectable 30 milliGRAM(s) SubCutaneous every 12 hours  gabapentin 300 milliGRAM(s) Oral three times a day  levothyroxine 50 MICROGram(s) Oral daily  tamsulosin 0.4 milliGRAM(s) Oral at bedtime  tiotropium 18 MICROgram(s) Capsule 1 Capsule(s) Inhalation daily  valproic  acid Syrup 500 milliGRAM(s) Oral two times a day    MEDICATIONS  (PRN):  HYDROmorphone  Injectable 0.5 milliGRAM(s) IV Push every 4 hours PRN Severe Pain (7 - 10)      Vital Signs Last 24 Hrs  T(C): 36.7 (01 Aug 2018 11:00), Max: 36.7 (31 Jul 2018 21:00)  T(F): 98 (01 Aug 2018 11:00), Max: 98 (31 Jul 2018 21:00)  HR: 57 (01 Aug 2018 11:00) (57 - 78)  BP: 121/76 (01 Aug 2018 11:00) (121/76 - 127/70)  BP(mean): --  RR: 18 (01 Aug 2018 04:00) (18 - 18)  SpO2: 91% (01 Aug 2018 14:31) (91% - 97%)    PHYSICAL EXAM:      Cons: NAD  HEENT: PERRL, EOMI  Pulm: CTAB  Cardiac: RRR  GI: Soft, nttp, nd, erythema surrounding PEG   Skin: none        I&O's Detail    31 Jul 2018 07:01  -  01 Aug 2018 07:00  --------------------------------------------------------  IN:    Pivot: 480 mL  Total IN: 480 mL    OUT:  Total OUT: 0 mL    Total NET: 480 mL      01 Aug 2018 07:01  -  01 Aug 2018 15:39  --------------------------------------------------------  IN:  Total IN: 0 mL    OUT:    Voided: 600 mL  Total OUT: 600 mL    Total NET: -600 mL          LABS:                        8.4    4.2   )-----------( 128      ( 01 Aug 2018 07:09 )             26.8     08-01    150<H>  |  112<H>  |  23.0<H>  ----------------------------<  108  3.8   |  26.0  |  0.70    Ca    8.5<L>      01 Aug 2018 07:09  Phos  3.9     08-01  Mg     1.8     08-01            RADIOLOGY & ADDITIONAL STUDIES:
Patient in bed with 1:1 present.   Reports that he feels fine.   Although does not state, does visually seems in discomfort in the head and is occasionally clutching it.   Threshold for further questions is low.     FUNCTIONAL PROGRESS    Bed Mobility  Bed Mobility Training Sit-to-Supine: moderate assist (50% patient effort)  Bed Mobility Training Supine-to-Sit: moderate assist (50% patient effort)    Sit-Stand Transfer Training  Transfer Training Sit-to-Stand Transfer: moderate assist (50% patient effort);  2 person assist  Transfer Training Stand-to-Sit Transfer: moderate assist (50% patient effort);  2 person assist    Gait Training  Gait Trainin'x2 RW modAx2  Gait Analysis: decreased gait velocity and activity tolerance, pt ambulated on maribel forefoot c unsteadiness throughout requiring assist      REVIEW OF SYSTEMS  Constitutional - No fever,  +fatigue  Neurological - +headaches, +loss of strength      VITALS  T(C): 36.7 (18 @ 11:00), Max: 36.7 (18 @ 21:00)  HR: 57 (18 @ 11:00) (57 - 78)  BP: 121/76 (18 @ 11:00) (121/76 - 127/70)  RR: 18 (18 @ 04:00) (18 - 18)  SpO2: 95% (18 @ 07:40) (95% - 97%)  Wt(kg): --    MEDICATIONS   ALBUTerol/ipratropium for Nebulization 3 milliLiter(s) every 6 hours  chlorhexidine 0.12% Liquid 15 milliLiter(s) two times a day  chlorhexidine 2% Cloths 1 Application(s) daily  enoxaparin Injectable 30 milliGRAM(s) every 12 hours  gabapentin 300 milliGRAM(s) three times a day  HYDROmorphone  Injectable 0.5 milliGRAM(s) every 4 hours PRN  levothyroxine 50 MICROGram(s) daily  tamsulosin 0.4 milliGRAM(s) at bedtime  tiotropium 18 MICROgram(s) Capsule 1 Capsule(s) daily  valproic  acid Syrup 500 milliGRAM(s) two times a day      RECENT LABS/IMAGING  CBC Full  -  ( 01 Aug 2018 07:09 )  WBC Count : 4.2 K/uL  Hemoglobin : 8.4 g/dL  Hematocrit : 26.8 %  Platelet Count - Automated : 128 K/uL  Mean Cell Volume : 95.7 fl  Mean Cell Hemoglobin : 30.0 pg  Mean Cell Hemoglobin Concentration : 31.3 g/dL  Auto Neutrophil # : 2.4 K/uL  Auto Lymphocyte # : 1.3 K/uL  Auto Monocyte # : 0.4 K/uL  Auto Eosinophil # : 0.1 K/uL  Auto Basophil # : 0.0 K/uL  Auto Neutrophil % : 57.4 %  Auto Lymphocyte % : 30.2 %  Auto Monocyte % : 8.8 %  Auto Eosinophil % : 1.7 %  Auto Basophil % : 0.7 %        150<H>  |  112<H>  |  23.0<H>  ----------------------------<  108  3.8   |  26.0  |  0.70    Ca    8.5<L>      01 Aug 2018 07:09  Phos  3.9     08  Mg     1.8             ----------------------------------------------------------------------------------------  PHYSICAL EXAM  Constitutional - NAD, Uncomfortable  Extremities - Mild swelling of the hands, No calf tenderness, 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 Limited BLE motor   Psychiatric - Mood anxious - related to pain in head possibly  ----------------------------------------------------------------------------------------  ASSESSMENT/PLAN  52yMale with functional deficits after a suspected fall sustaining a severe TBI (GCS- initial 7, motor best - 5)  Pain - Tylenol, Dilaudid, Neurontin  DVT PPX - SCDs, Lovenox  Mood - Depakote  Diet - NPO, PEG  Oral Care - Improved significantly, Aggressive oral care, Peridex  Rehab - Spoke to CM and group home about premorbid and current functional level and there is a clear difference. Given patient's premorbid functional debility and new TBI, recommend ACUTE inpatient rehabilitation for the functional deficits consisting of 3 hours of therapy/day & 24 hour RN/daily PMR physician for comorbid medical management. Will continue to follow for ongoing rehab needs and recommendations. Patient will be able to tolerate 3 hours a day.    Continue bedside therapy as well as OOB throughout the day with mobilization throughout the day with staff to maintain cardiopulmonary function and prevention of secondary complications related to debility.
Patient in bed, half asleep.   1:1 at bedside.   As per repot, is not sleeping at night, now fell asleep at 530.  Had a large BM this AM and was washed up, now exhausted.   Mittens placed for biting his own fingers.     FUNCTIONAL PROGRESS  8/2  Bed Mobility  Bed Mobility Training Supine-to-Sit: stand-by assist;  close S;  bed rails  Bed Mobility Training Limitations: decreased ability to use legs for bridging/pushing;  cognitive, decreased safety awareness;  impaired balance;  impaired coordination    Sit-Stand Transfer Training  Transfer Training Sit-to-Stand Transfer: moderate assist (50% patient effort);  1 person assist;  full weight-bearing   rolling walker  Transfer Training Stand-to-Sit Transfer: moderate assist (50% patient effort);  1 person assist;  full weight-bearing   rolling walker  Sit-to-Stand Transfer Training Transfer Safety Analysis: decreased balance;  decreased cognition;  impaired balance;  impaired coordination;  cognitive, decreased safety awareness;  decreased ROM;  abnormal muscle tone    Gait Training  Gait Training: moderate assist (50% patient effort);  2 person assist;  2nd person was stand-by for safety;  full weight-bearing   rolling walker;  50 feet;  10 feet  Gait Analysis: 3-point gait   ataxic;  maribel toe walker left>right;  decreased ROM;  impaired balance;  impaired coordination;  cognitive, decreased safety awareness;  abnormal muscle tone;  10 feet;  50 feet;  rolling walker  Gait Number of Times:: x 2  Type of Rest Type of Rest: sitting  Duration of Rest Duration of Rest: approx 5 mins       REVIEW OF SYSTEMS  Constitutional - No fever,  +fatigue  Neurological - +loss of strength    VITALS  T(C): 36.7 (08-06-18 @ 06:00), Max: 37 (08-05-18 @ 16:19)  HR: 67 (08-06-18 @ 06:00) (62 - 68)  BP: 105/62 (08-06-18 @ 06:00) (105/62 - 123/73)  RR: 20 (08-06-18 @ 06:00) (18 - 20)  SpO2: --  Wt(kg): --    MEDICATIONS   artificial tears (preservative free) Ophthalmic Solution 2 Drop(s) every 3 hours  chlorhexidine 0.12% Liquid 15 milliLiter(s) two times a day  chlorhexidine 2% Cloths 1 Application(s) daily  doxycycline IVPB 100 milliGRAM(s) every 12 hours  enoxaparin Injectable 30 milliGRAM(s) every 12 hours  gabapentin 300 milliGRAM(s) three times a day  levothyroxine 50 MICROGram(s) daily  melatonin 5 milliGRAM(s) at bedtime  tamsulosin 0.4 milliGRAM(s) at bedtime  tiotropium 18 MICROgram(s) Capsule 1 Capsule(s) daily  valproic  acid Syrup 500 milliGRAM(s) every 8 hours      RECENT LABS/IMAGING  CBC Full  -  ( 05 Aug 2018 08:30 )  WBC Count : 4.6 K/uL  Hemoglobin : 9.0 g/dL  Hematocrit : 28.5 %  Platelet Count - Automated : 331 K/uL  Mean Cell Volume : 95.6 fl  Mean Cell Hemoglobin : 30.2 pg  Mean Cell Hemoglobin Concentration : 31.6 g/dL  Auto Neutrophil # : 3.0 K/uL  Auto Lymphocyte # : 1.0 K/uL  Auto Monocyte # : 0.4 K/uL  Auto Eosinophil # : 0.0 K/uL  Auto Basophil # : 0.0 K/uL  Auto Neutrophil % : 65.0 %  Auto Lymphocyte % : 22.5 %  Auto Monocyte % : 9.4 %  Auto Eosinophil % : 1.1 %  Auto Basophil % : 0.9 %    08-05    146<H>  |  110<H>  |  15.0  ----------------------------<  93  3.5   |  24.0  |  0.60    Ca    8.9      05 Aug 2018 08:30  Phos  3.3     08-05  Mg     1.9     08-05        -----------------------------------------------------------------------  PHYSICAL EXAM  Constitutional - NAD, Somnolent  Extremities - BLE equinovarus deformity, BUE mittens  Neurologic Exam -                    Cognitive - Somnolent, tracks, spontaneously moves BUE     Communication - Limited verbal output      Motor - Limited in all 4 extremities LE>UE - able to SF, EF, EE. Limited BLE motor   Psychiatric - Mood stable - fatigued  ----------------------------------------------------------------------------------------  ASSESSMENT/PLAN  52yMale with functional deficits after a suspected fall sustaining a severe TBI (GCS- initial 7, motor best - 5)  Pain - Neurontin  Cellulitis - Doxycycline  DVT PPX - SCDs, Lovenox  Mood - Depakote 500mg Q8 (increased from 500mg Q12 8/3), Seroquel 50mg HS (8/6)  Sleep - Melatonin 5mg (8/3)  NPO Diet - Pivot 1.5, Free water  Oral Care - Aggressive oral care, Peridex  Rehab - Recommend ACUTE inpatient rehabilitation for the functional deficits consisting of 3 hours of therapy/day & 24 hour RN/daily PMR physician for comorbid medical management. Will continue to follow for ongoing rehab needs and recommendations. Patient will be able to tolerate 3 hours a day.    Continue bedside therapy as well as OOB throughout the day with mobilization throughout the day with staff to maintain cardiopulmonary function and prevention of secondary complications related to debility.
Patient in bed, more alert and able to respond to some questions.  Able to follow more commands, but limited related to his physical impairments  Reports no pain.   Continues to have 1:1.  Worked with PT after my visit and noted to demonstrate ability to improve.   As per PO, patient has had previous episodes of aspirations and PNA s/p PEG in 2018.    FUNCTIONAL PROGRESS    Bed Mobility  Bed Mobility Training Sit-to-Supine: moderate assist (50% patient effort)  Bed Mobility Training Supine-to-Sit: moderate assist (50% patient effort)    Sit-Stand Transfer Training  Transfer Training Sit-to-Stand Transfer: moderate assist (50% patient effort);  2 person assist  Transfer Training Stand-to-Sit Transfer: moderate assist (50% patient effort);  2 person assist    Gait Training  Gait Trainin'x2 RW modAx2  Gait Analysis: decreased gait velocity and activity tolerance, pt ambulated on maribel forefoot c unsteadiness throughout requiring assist      REVIEW OF SYSTEMS  Constitutional - No fever,  No fatigue  Neurological - +loss of strength    VITALS  T(C): 36.7 (18 @ 05:00), Max: 37.4 (18 @ 16:00)  HR: 62 (18 @ 09:47) (59 - 77)  BP: 112/74 (18 @ 05:00) (107/71 - 112/74)  RR: 20 (18 @ 05:00) (18 - 20)  SpO2: 92% (18 @ 09:47) (92% - 96%)  Wt(kg): --    MEDICATIONS   ALBUTerol/ipratropium for Nebulization 3 milliLiter(s) every 6 hours  chlorhexidine 2% Cloths 1 Application(s) daily  enoxaparin Injectable 30 milliGRAM(s) every 12 hours  gabapentin 300 milliGRAM(s) three times a day  HYDROmorphone  Injectable 0.5 milliGRAM(s) every 4 hours PRN  levothyroxine 50 MICROGram(s) daily  tamsulosin 0.4 milliGRAM(s) at bedtime  tiotropium 18 MICROgram(s) Capsule 1 Capsule(s) daily  valproic  acid Syrup 500 milliGRAM(s) two times a day      RECENT LABS/IMAGING  CBC Full  -  ( 2018 06:58 )  WBC Count : 4.2 K/uL  Hemoglobin : 9.0 g/dL  Hematocrit : 28.7 %  Platelet Count - Automated : 97 K/uL  Mean Cell Volume : 95.0 fl  Mean Cell Hemoglobin : 29.8 pg  Mean Cell Hemoglobin Concentration : 31.4 g/dL  Auto Neutrophil # : x  Auto Lymphocyte # : x  Auto Monocyte # : x  Auto Eosinophil # : x  Auto Basophil # : x  Auto Neutrophil % : x  Auto Lymphocyte % : x  Auto Monocyte % : x  Auto Eosinophil % : x  Auto Basophil % : x        151<H>  |  112<H>  |  20.0  ----------------------------<  69<L>  3.8   |  26.0  |  0.69    Ca    8.7      2018 06:58  Phos  3.9       Mg     1.7             ----------------------------------------------------------------------------------------  PHYSICAL EXAM  Constitutional - NAD, Comfortable  Extremities - Mild swelling of the hands, No calf tenderness, left equinovarus deformity  Neurologic Exam -                    Cognitive - Awake, Alert, AAO, states name      Communication - Dysarthria, Expressive deficits, Able to state some words      Motor - Limited in all 4 extremities - able to raise hands to command  Psychiatric - Mood appears stable   ----------------------------------------------------------------------------------------  ASSESSMENT/PLAN  52yMale with functional deficits after a suspected fall sustaining a severe TBI (GCS- initial 7, motor best - 5)  Pain - Tylenol, Dilaudid, neurontin  DVT PPX - SCDs, Lovenox  Mood - Depakote  Diet - NPO, PEG  Oral Care - Recommend aggressive oral care, Ordered Peridex  Rehab - Given patient's premorbid functional debility and new TBI, recommend ACUTE inpatient rehabilitation for the functional deficits consisting of 3 hours of therapy/day & 24 hour RN/daily PMR physician for comorbid medical management. Will continue to follow for ongoing rehab needs and recommendations. Patient will be able to tolerate 3 hours a day.    Continue bedside therapy as well as OOB throughout the day with mobilization throughout the day with staff to maintain cardiopulmonary function and prevention of secondary complications related to debility.
Patient in bed, resting comfortably.   Earlier as per 1:1 was more restless.   Did not appear to be in pain.     FUNCTIONAL PROGRESS  7/31  Mod A x2    REVIEW OF SYSTEMS  Constitutional - No fever,  +fatigue  Neurological - +loss of strength    VITALS  T(C): 36.6 (08-02-18 @ 11:00), Max: 36.7 (08-01-18 @ 16:30)  HR: 65 (08-02-18 @ 11:00) (60 - 69)  BP: 128/73 (08-02-18 @ 11:00) (112/64 - 128/73)  RR: 18 (08-02-18 @ 11:00) (17 - 18)  SpO2: 95% (08-02-18 @ 04:48) (91% - 95%)  Wt(kg): --    MEDICATIONS   ALBUTerol/ipratropium for Nebulization 3 milliLiter(s) every 6 hours  chlorhexidine 0.12% Liquid 15 milliLiter(s) two times a day  chlorhexidine 2% Cloths 1 Application(s) daily  doxycycline monohydrate Suspension 100 milliGRAM(s) every 12 hours  enoxaparin Injectable 30 milliGRAM(s) every 12 hours  gabapentin 300 milliGRAM(s) three times a day  HYDROmorphone  Injectable 0.5 milliGRAM(s) every 4 hours PRN  levothyroxine 50 MICROGram(s) daily  magnesium sulfate  IVPB 2 Gram(s) once  tamsulosin 0.4 milliGRAM(s) at bedtime  tiotropium 18 MICROgram(s) Capsule 1 Capsule(s) daily  valproic  acid Syrup 500 milliGRAM(s) two times a day      RECENT LABS/IMAGING  CBC Full  -  ( 02 Aug 2018 07:04 )  WBC Count : 4.6 K/uL  Hemoglobin : 8.0 g/dL  Hematocrit : 25.4 %  Platelet Count - Automated : 142 K/uL  Mean Cell Volume : 95.1 fl  Mean Cell Hemoglobin : 30.0 pg  Mean Cell Hemoglobin Concentration : 31.5 g/dL  Auto Neutrophil # : 2.7 K/uL  Auto Lymphocyte # : 1.2 K/uL  Auto Monocyte # : 0.5 K/uL  Auto Eosinophil # : 0.0 K/uL  Auto Basophil # : 0.0 K/uL  Auto Neutrophil % : 60.0 %  Auto Lymphocyte % : 26.2 %  Auto Monocyte % : 11.6 %  Auto Eosinophil % : 1.1 %  Auto Basophil % : 0.2 %    08-02    149<H>  |  112<H>  |  20.0  ----------------------------<  115  4.1   |  26.0  |  0.56    Ca    8.4<L>      02 Aug 2018 07:04  Phos  3.3     08-02  Mg     1.9     08-02      -----------------------------------------------------------------------  PHYSICAL EXAM  Constitutional - NAD, Comfortable  Extremities - Mild swelling of the hands, No calf tenderness, 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 Limited BLE motor   Psychiatric - Mood stable  ----------------------------------------------------------------------------------------  ASSESSMENT/PLAN  52yMale with functional deficits after a suspected fall sustaining a severe TBI (GCS- initial 7, motor best - 5)  Pain - Dilaudid, Neurontin  DVT PPX - SCDs, Lovenox  Mood - Depakote  Diet - NPO, PEG  Oral Care -Continue ggressive oral care, Peridex  Rehab - Recommend ACUTE inpatient rehabilitation for the functional deficits consisting of 3 hours of therapy/day & 24 hour RN/daily PMR physician for comorbid medical management. Will continue to follow for ongoing rehab needs and recommendations. Patient will be able to tolerate 3 hours a day.    Continue bedside therapy as well as OOB throughout the day with mobilization throughout the day with staff to maintain cardiopulmonary function and prevention of secondary complications related to debility.
Patient in bed, resting.  As per 1:1 was restless and agitated - trying to get up and pulling at various objects.   Received haldol.   No mention of actual physical harm to self or others.     FUNCTIONAL PROGRESS  8/2  Sit-Stand Transfer Training  Transfer Training Sit-to-Stand Transfer: moderate assist (50% patient effort);  1 person assist;  full weight-bearing   rolling walker  Transfer Training Stand-to-Sit Transfer: moderate assist (50% patient effort);  1 person assist;  full weight-bearing   rolling walker  Sit-to-Stand Transfer Training Transfer Safety Analysis: decreased balance;  decreased cognition;  impaired balance;  impaired coordination;  cognitive, decreased safety awareness;  decreased ROM;  abnormal muscle tone    Gait Training  Gait Training: moderate assist (50% patient effort);  2 person assist;  2nd person was stand-by for safety;  full weight-bearing   rolling walker;  50 feet;  10 feet  Gait Analysis: 3-point gait   ataxic;  maribel toe walker left>right;  decreased ROM;  impaired balance;  impaired coordination;  cognitive, decreased safety awareness;  abnormal muscle tone;  10 feet;  50 feet;  rolling walker  Gait Number of Times:: x 2  Type of Rest Type of Rest: sitting  Duration of Rest Duration of Rest: approx 5 mins           REVIEW OF SYSTEMS  Constitutional - No fever,  +fatigue  Neurological - +loss of strength    VITALS  T(C): 36.6 (08-03-18 @ 04:56), Max: 36.8 (08-02-18 @ 19:59)  HR: 77 (08-03-18 @ 04:56) (59 - 77)  BP: 125/82 (08-03-18 @ 04:56) (116/71 - 128/73)  RR: 20 (08-03-18 @ 04:56) (18 - 20)  SpO2: 98% (08-03-18 @ 04:56) (93% - 98%)  Wt(kg): --    MEDICATIONS   ALBUTerol/ipratropium for Nebulization 3 milliLiter(s) every 6 hours  artificial tears (preservative free) Ophthalmic Solution 2 Drop(s) every 3 hours  chlorhexidine 0.12% Liquid 15 milliLiter(s) two times a day  chlorhexidine 2% Cloths 1 Application(s) daily  doxycycline IVPB 100 milliGRAM(s) every 12 hours  doxycycline monohydrate Suspension 100 milliGRAM(s) every 12 hours  enoxaparin Injectable 30 milliGRAM(s) every 12 hours  gabapentin 300 milliGRAM(s) three times a day  haloperidol    Injectable 10 milliGRAM(s) every 6 hours PRN  HYDROmorphone  Injectable 0.5 milliGRAM(s) every 4 hours PRN  levothyroxine 50 MICROGram(s) daily  tamsulosin 0.4 milliGRAM(s) at bedtime  tiotropium 18 MICROgram(s) Capsule 1 Capsule(s) daily  valproic  acid Syrup 500 milliGRAM(s) two times a day      RECENT LABS/IMAGING  CBC Full  -  ( 03 Aug 2018 06:49 )  WBC Count : 5.0 K/uL  Hemoglobin : 8.7 g/dL  Hematocrit : 27.5 %  Platelet Count - Automated : 179 K/uL  Mean Cell Volume : 94.8 fl  Mean Cell Hemoglobin : 30.0 pg  Mean Cell Hemoglobin Concentration : 31.6 g/dL  Auto Neutrophil # : x  Auto Lymphocyte # : x  Auto Monocyte # : x  Auto Eosinophil # : x  Auto Basophil # : x  Auto Neutrophil % : x  Auto Lymphocyte % : x  Auto Monocyte % : x  Auto Eosinophil % : x  Auto Basophil % : x    08-03    145  |  107  |  18.0  ----------------------------<  93  3.8   |  24.0  |  0.50    Ca    8.7      03 Aug 2018 06:49  Phos  3.2     08-03  Mg     1.9     08-03        -----------------------------------------------------------------------  PHYSICAL EXAM  Constitutional - NAD, Comfortable  Extremities - Mild swelling of the hands, No calf tenderness, BLE equinovarus deformity  Neurologic Exam -                    Cognitive - Awake, Alert, AAO self      Communication - Dysarthria, Expressive deficits, Limited verbal output      Motor - Limited in all 4 extremities LE>UE - able to SF, EF, EE. Limited BLE motor   Psychiatric - Mood stable - fatigued  ----------------------------------------------------------------------------------------  ASSESSMENT/PLAN  52yMale with functional deficits after a suspected fall sustaining a severe TBI (GCS- initial 7, motor best - 5)  Pain - Neurontin  DVT PPX - SCDs, Lovenox  Mood - Depakote 500mg Q8 (increased from 500mg Q12 8/3)  Sleep - Melatonin 5mg (8/3)  NPO Diet - Pivot 1.5, Free water  Oral Care - Aggressive oral care, Peridex  Rehab - Patient is making progress. Recommend ACUTE inpatient rehabilitation for the functional deficits consisting of 3 hours of therapy/day & 24 hour RN/daily PMR physician for comorbid medical management. Will continue to follow for ongoing rehab needs and recommendations. Patient will be able to tolerate 3 hours a day.    Continue bedside therapy as well as OOB throughout the day with mobilization throughout the day with staff to maintain cardiopulmonary function and prevention of secondary complications related to debility.
SUBJECTIVE:  Overnight the patient tried to manipulate his IV access and was bitting his finger, b/l hand mitts were ordered. Patient had no complaints. Denied chest pain.     MEDICATIONS  (STANDING):  artificial tears (preservative free) Ophthalmic Solution 2 Drop(s) Right EYE every 3 hours  chlorhexidine 0.12% Liquid 15 milliLiter(s) Swish and Spit two times a day  chlorhexidine 2% Cloths 1 Application(s) Topical daily  doxycycline IVPB 100 milliGRAM(s) IV Intermittent every 12 hours  enoxaparin Injectable 30 milliGRAM(s) SubCutaneous every 12 hours  gabapentin 300 milliGRAM(s) Oral three times a day  levothyroxine 50 MICROGram(s) Oral daily  melatonin 5 milliGRAM(s) Oral at bedtime  tamsulosin 0.4 milliGRAM(s) Oral at bedtime  tiotropium 18 MICROgram(s) Capsule 1 Capsule(s) Inhalation daily  valproic  acid Syrup 500 milliGRAM(s) Oral every 8 hours    MEDICATIONS  (PRN):      Vital Signs Last 24 Hrs  T(C): 37.1 (04 Aug 2018 21:10), Max: 37.1 (04 Aug 2018 14:00)  T(F): 98.8 (04 Aug 2018 21:10), Max: 98.8 (04 Aug 2018 21:10)  HR: 69 (04 Aug 2018 21:10) (69 - 76)  BP: 127/74 (04 Aug 2018 21:10) (127/74 - 141/80)  BP(mean): --  RR: 18 (04 Aug 2018 21:10) (18 - 20)  SpO2: --    PE  Gen: Awake, not oriented.  Pulm: Non labored breathing.   CV: RRR  Abd: Soft.  Ext: Hand mitts b/l        I&O's Detail    03 Aug 2018 07:01  -  04 Aug 2018 07:00  --------------------------------------------------------  IN:    Enteral Tube Flush: 500 mL    Pivot: 315 mL  Total IN: 815 mL    OUT:    Voided: 1000 mL  Total OUT: 1000 mL    Total NET: -185 mL      04 Aug 2018 07:01  -  05 Aug 2018 05:55  --------------------------------------------------------  IN:    Enteral Tube Flush: 500 mL    Pivot: 735 mL  Total IN: 1235 mL    OUT:    Voided: 730 mL  Total OUT: 730 mL    Total NET: 505 mL          LABS:                        8.7    4.7   )-----------( 239      ( 04 Aug 2018 10:51 )             27.4     08-04    148<H>  |  112<H>  |  15.0  ----------------------------<  102  3.7   |  25.0  |  0.57    Ca    8.8      04 Aug 2018 10:51  Phos  3.4     08-04  Mg     1.8     08-04            RADIOLOGY & ADDITIONAL STUDIES:
HPI/OVERNIGHT EVENTS:  No acute events overnight. VSS. Afebrile. Temporarily paused tube feeds overnight due to mild distension/gas in the abdomen. +flatus/BM.       MEDICATIONS  (STANDING):  ALBUTerol/ipratropium for Nebulization 3 milliLiter(s) Nebulizer every 6 hours  amiodarone    Tablet 200 milliGRAM(s) Oral daily  chlorhexidine 2% Cloths 1 Application(s) Topical daily  enoxaparin Injectable 30 milliGRAM(s) SubCutaneous every 12 hours  gabapentin 300 milliGRAM(s) Oral three times a day  levothyroxine 50 MICROGram(s) Oral daily  tamsulosin 0.4 milliGRAM(s) Oral at bedtime  tiotropium 18 MICROgram(s) Capsule 1 Capsule(s) Inhalation daily  valproic  acid Syrup 500 milliGRAM(s) Oral two times a day    MEDICATIONS  (PRN):  HYDROmorphone  Injectable 0.5 milliGRAM(s) IV Push every 4 hours PRN Severe Pain (7 - 10)      Vital Signs Last 24 Hrs  T(C): 37.1 (30 Jul 2018 12:00), Max: 37.6 (29 Jul 2018 16:21)  T(F): 98.8 (30 Jul 2018 12:00), Max: 99.7 (29 Jul 2018 16:21)  HR: 90 (30 Jul 2018 12:00) (56 - 90)  BP: 126/69 (30 Jul 2018 12:00) (105/54 - 126/69)  BP(mean): --  RR: 18 (30 Jul 2018 12:00) (18 - 20)  SpO2: --    Gen: WDWN, NAD  HEENT: NCAT, PERRLA  neck: supple  CV: rrr  resp: unlabored breathing b/l  abd: mildly distended, non tender to palpation  msk: moving all extremities      I&O's Detail    29 Jul 2018 07:01  -  30 Jul 2018 07:00  --------------------------------------------------------  IN:    dextrose 5% + sodium chloride 0.45%.: 750 mL    Enteral Tube Flush: 60 mL    Pivot: 800 mL    sodium chloride 0.9%: 150 mL    Solution: 250 mL  Total IN: 2010 mL    OUT:    Indwelling Catheter - Urethral: 700 mL  Total OUT: 700 mL    Total NET: 1310 mL      30 Jul 2018 07:01  -  30 Jul 2018 15:18  --------------------------------------------------------  IN:  Total IN: 0 mL    OUT:    Indwelling Catheter - Urethral: 300 mL  Total OUT: 300 mL    Total NET: -300 mL          LABS:                        8.6    5.6   )-----------( 83       ( 30 Jul 2018 07:12 )             27.0     07-30    152<H>  |  113<H>  |  17.0  ----------------------------<  96  3.8   |  27.0  |  0.65    Ca    8.5<L>      30 Jul 2018 07:12  Phos  3.6     07-30  Mg     1.6     07-30
INTERVAL HPI/OVERNIGHT EVENTS:    PT extubated yesterday.  Pt receiving percussion therapy.  Receiving tube feeds via PEG tube.  Pt will continue on Zosyn for UTI.  Clinically improving.      MEDICATIONS  (STANDING):  acetaminophen  IVPB. 1000 milliGRAM(s) IV Intermittent once  ALBUTerol/ipratropium for Nebulization 3 milliLiter(s) Nebulizer every 6 hours  amiodarone    Tablet 200 milliGRAM(s) Oral daily  chlorhexidine 2% Cloths 1 Application(s) Topical daily  insulin lispro (HumaLOG) corrective regimen sliding scale   SubCutaneous every 6 hours  piperacillin/tazobactam IVPB. 3.375 Gram(s) IV Intermittent every 8 hours  sodium chloride 0.9%. 1000 milliLiter(s) (50 mL/Hr) IV Continuous <Continuous>  tamsulosin 0.4 milliGRAM(s) Oral at bedtime  tiotropium 18 MICROgram(s) Capsule 1 Capsule(s) Inhalation daily  valproic  acid Syrup 500 milliGRAM(s) Oral two times a day    MEDICATIONS  (PRN):  HYDROmorphone  Injectable 0.5 milliGRAM(s) IV Push every 4 hours PRN Severe Pain (7 - 10)      Drug Dosing Weight  Height (cm): 165.1 (2018 11:50)  Weight (kg): 62.3 (2018 11:50)  BMI (kg/m2): 22.9 (2018 11:50)  BSA (m2): 1.69 (2018 11:50)        PAST MEDICAL & SURGICAL HISTORY:  MR (mental retardation)  Glaucoma  Seizures      ICU Vital Signs Last 24 Hrs  T(C): 36 (2018 08:00), Max: 36.3 (2018 12:00)  T(F): 96.8 (2018 08:00), Max: 97.3 (2018 12:00)  HR: 64 (2018 08:00) (49 - 69)  BP: 93/57 (2018 08:00) (86/53 - 111/56)  BP(mean): 70 (2018 08:00) (65 - 77)  ABP: 177/177 (2018 06:00) (78/62 - 202/202)  ABP(mean): 177 (2018 06:00) (62 - 202)  RR: 26 (2018 08:00) (10 - 34)  SpO2: 93% (2018 08:00) (87% - 98%)      ABG - ( 2018 04:33 )  pH, Arterial: 7.41  pH, Blood: x     /  pCO2: 40    /  pO2: 60    / HCO3: 25    / Base Excess: 0.6   /  SaO2: 93                  I&O's Detail    2018 07:01  -  2018 07:00  --------------------------------------------------------  IN:    amiodarone Infusion: 116.9 mL    Enteral Tube Flush: 90 mL    fentaNYL Infusion.: 21.7 mL    norepinephrine Infusion: 66.6 mL    Pivot: 360 mL    sodium chloride 0.9%.: 1250 mL    Solution: 62.5 mL    Solution: 300 mL    Solution: 200 mL    vasopressin Infusion: 7.2 mL  Total IN: 2474.9 mL    OUT:    Indwelling Catheter - Urethral: 985 mL    Nasoenteral Tube: 400 mL  Total OUT: 1385 mL    Total NET: 1089.9 mL      2018 07:01  -  2018 08:48  --------------------------------------------------------  IN:    Pivot: 40 mL    sodium chloride 0.9%.: 50 mL    Solution: 25 mL  Total IN: 115 mL    OUT:    Indwelling Catheter - Urethral: 45 mL  Total OUT: 45 mL    Total NET: 70 mL          Mode: standby,40 cam      Physical Exam:    Neurological:  GCS 15.  At baseline.  No sensory/motor deficits    HEENT: PERRLA, EOMI, no drainage or redness    Neck: No bruits; no thyromegaly or nodules,  No JVD    Back: Normal spine flexure, No CVA tenderness, No deformity or limitation of movement    Respiratory: Breath Sounds equal & clear to auscultation, no accessory muscle use    Cardiovascular: Regular rate & rhythm, normal S1, S2; no murmurs, gallops or rubs    Gastrointestinal:  PEG tube in place.  Soft, non-tender, normal bowel sounds    Extremities: No peripheral edema, No cyanosis, clubbing     Vascular: Equal and normal pulses: 2+ peripheral pulses throughout      LABS:  CBC Full  -  ( 2018 06:43 )  WBC Count : 12.7 K/uL  Hemoglobin : 11.2 g/dL  Hematocrit : 34.5 %  Platelet Count - Automated : 122 K/uL  Mean Cell Volume : 91.0 fl  Mean Cell Hemoglobin : 29.6 pg  Mean Cell Hemoglobin Concentration : 32.5 g/dL  Auto Neutrophil # : 10.1 K/uL  Auto Lymphocyte # : 1.6 K/uL  Auto Monocyte # : 0.9 K/uL  Auto Eosinophil # : 0.0 K/uL  Auto Basophil # : 0.0 K/uL  Auto Neutrophil % : 79.6 %  Auto Lymphocyte % : 12.4 %  Auto Monocyte % : 7.0 %  Auto Eosinophil % : 0.2 %  Auto Basophil % : 0.2 %        144  |  112<H>  |  14.0  ----------------------------<  97  3.7   |  23.0  |  0.62    Ca    7.9<L>      2018 04:40  Phos  2.3     -  Mg     2.0     -    TPro  6.3<L>  /  Alb  3.2<L>  /  TBili  0.5  /  DBili  x   /  AST  45<H>  /  ALT  48<H>  /  AlkPhos  106  07-26    PT/INR - ( 2018 11:02 )   PT: 12.6 sec;   INR: 1.14 ratio         PTT - ( 2018 11:02 )  PTT:40.5 sec  Urinalysis Basic - ( 2018 14:23 )    Color: Yellow / Appearance: Clear / S.010 / pH: x  Gluc: x / Ketone: Trace  / Bili: Negative / Urobili: Negative mg/dL   Blood: x / Protein: 15 mg/dL / Nitrite: Negative   Leuk Esterase: Negative / RBC: x / WBC x   Sq Epi: x / Non Sq Epi: Occasional / Bacteria: x

## 2018-08-06 NOTE — PROGRESS NOTE ADULT - PROVIDER SPECIALTY LIST ADULT
Neurosurgery
Neurosurgery
Rehab Medicine
SICU
Surgery
Trauma Surgery
Vascular Surgery
SICU
Trauma Surgery
Trauma Surgery

## 2018-08-06 NOTE — H&P ADULT - ATTENDING COMMENTS
Pt. seen.  Agree with documentation above as per NP with amendments made as appropriate. Patient medically stable.

## 2018-08-06 NOTE — H&P ADULT - NSHPPHYSICALEXAM_GEN_ALL_CORE
Mental Status - Patient is awake, non-verbal. Unable to follow simple commands consistently. Poor attention and concentration.   Cranial Nerves -unable to assess   Motor Exam - No noticeable focal deficits, moves all extremities, deformity to left foot prevents normal ROM of ankle, unable to perform neuromuscular exam secondary to inability to cooperate and communicate effectively.   Sensory: unable to assess LT, withdraws from painful stimuli  Coord: unable to assess  Gait -  unable to assess  DTS Reflexes: unable to assess due to agitation during exam                           HEENT:  normocephalic, atraumatic		  LUNGS:	Clear bilaterally 	  HEART:	 Normal S1S2   No murmur RRR        GI/ ABDOMEN:  Soft, appears to have pain with palpation around peg tube site, erythema noted around peg tube insertion site.   EXTREMITIES:   No Edema    SKIN: Partial thickness wounds to bilateral elbows with warmth and erythema. Irritation and rash to bilat buttocks, blanchable erythema to bilat buttocks. Scattered healing scratches to arms and torso. Ecchymosis to right flank. Mental Status - Patient is awake, non-verbal. Unable to follow simple commands consistently. Poor attention and concentration.   Cranial Nerves -unable to assess   Motor Exam - No noticeable focal deficits, moves all extremities, deformity to left foot prevents normal ROM of ankle, unable to perform neuromuscular exam secondary to inability to cooperate and communicate effectively.   Sensory: unable to assess LT, withdraws from painful stimuli  Coord: unable to assess  Gait -  unable to assess  DTS Reflexes: unable to assess due to agitation during exam                           HEENT:  normocephalic, atraumatic		  LUNGS:	Clear bilaterally 	  HEART:	 Normal S1S2   No murmur RRR        GI/ ABDOMEN:  Soft, appears to have pain with palpation around peg tube site, erythema noted around peg tube insertion site.   EXTREMITIES:   bilat hand edema and bruising   SKIN: Partial thickness wounds to bilateral elbows with warmth and erythema. Irritation and rash to bilat buttocks, blanchable erythema to bilat buttocks. Scattered healing scratches to arms and torso. Ecchymosis to right flank. Mental Status - Patient is awake, minimally verbal--able to say "yes" or "no". Oriented to self--(via y/n answers), disoriented to place and time. Unable to follow simple commands consistently. Will mimic commands. Poor attention and concentration.   Cranial Nerves -unable to assess   Motor Exam - No noticeable focal deficits, moves all extremities equally, deformity to left foot prevents normal ROM of ankle, unable to perform  fully neuromuscular exam secondary to inability to cooperate and communicate effectively.   Sensory: unable to fully assess LT, withdraws from painful stimuli  Coord: unable to assess  Gait -  unable to assess  DTS Reflexes: unable to assess due to agitation during exam                           HEENT:  normocephalic, atraumatic		  LUNGS:	Clear bilaterally 	  HEART:	 Normal S1S2   No murmur RRR        GI/ ABDOMEN:  Soft, appears to have pain with palpation around peg tube site, erythema noted around peg tube insertion site.   EXTREMITIES:   bilat hand edema and bruising   SKIN: Partial thickness wounds to bilateral elbows with warmth and erythema. Irritation and rash to bilat buttocks, blanchable erythema to bilat buttocks. Scattered healing scratches to arms and torso. Ecchymosis to right flank. Mental Status - Patient is awake, minimally verbal--able to say "yes" or "no". Oriented to self--(via y/n answers), disoriented to place and time. Unable to follow simple commands consistently. Will mimic commands. Poor attention and concentration.   Cranial Nerves -unable to assess   Motor Exam - No noticeable focal deficits, moves all extremities equally, deformity to left foot prevents normal ROM of ankle, unable to perform  fully neuromuscular exam secondary to inability to cooperate and communicate effectively.   Sensory: unable to fully assess LT, withdraws from painful stimuli  Coord: unable to assess  Gait -  unable to assess  DTS Reflexes: unable to assess due to agitation during exam                           HEENT:  normocephalic, right eye closed--eyeball immobile		  LUNGS:	Clear bilaterally 	  HEART:	 Normal S1S2   No murmur RRR        GI/ ABDOMEN:  Soft, appears to have pain with palpation around peg tube site, erythema noted around peg tube insertion site.   EXTREMITIES:   bilat hand edema and bruising   SKIN: Partial thickness wounds to bilateral elbows with warmth and erythema. Irritation and rash to bilat buttocks, blanchable erythema to bilat buttocks. Scattered healing scratches to arms and torso. Ecchymosis to right flank.

## 2018-08-06 NOTE — H&P ADULT - PROBLEM SELECTOR PLAN 3
with behavioral problems and agitation. Continue increased depakote dosing, consider starting seroquel 50mg qHS. Continue home dose of klonopin. with behavioral problems and agitation. Continue increased depakote dosing. Continue home dose of klonopin.  Schedule seroquel 25mg qHS, and repeat 25mg if agitation or insomnia persists.

## 2018-08-06 NOTE — H&P ADULT - ASSESSMENT
This is a 51 y/o male with history of seizures and MR with new left parafrontal epidural and a minimal subdural hematoma/TBI now with functional deficits after sustaining fall.       Start comprehensive PT, OT and ST program.         Precautions: 1:1 observation for safety, aspiration, falls            Diet: NPO, tube feedings with 2Cal HN    DVT Prophylaxis:  lovenox                                                                       Medical Prognosis: fair    Prescreen Comparison: I have reviewed the prescreen information and I found no relevant changes between the preadmission  screening and my post admission evaluation.     Expected Therapy:   P.T.      1  hrs/day           O. T.    1  hrs/day           S.L.P.      1  hrs/day                    P&O    n/a                                               Excpected Frequency: 5 days/7 day period    Rehab Potential:    fair                                       Estimated Disposition:        home with home therapies             ELOS:        18-21      days      Rationale For Inpatient Rehab Admission- Patient demonstrates the following:     [X] Medically appropriate for rehabilitation admission   [X] Has attainable rehab goals with an approrpriate discharge plan  [X] Has rehabilitation potential (expected to make significant improvement within a reasonable period of time)  [X] Requires close medical management by a rehab physician, rehab nursing care and comprehensive interdisciplinary team (including         PT, OT, SLP and/or prosthetics and orthotics) This is a 53 y/o male with history of seizures and MR with new left parafrontal epidural and a minimal subdural hematoma/TBI now with functional deficits after sustaining fall.       Start comprehensive PT, OT and ST program.         Precautions: 1:1 observation for safety, aspiration, falls            Diet: NPO, start bolus tube feedings with 2Cal HN    DVT Prophylaxis:  lovenox                                                                       Medical Prognosis: fair    Prescreen Comparison: I have reviewed the prescreen information and I found no relevant changes between the preadmission  screening and my post admission evaluation.     Expected Therapy:   P.T.      1  hrs/day           O. T.    1  hrs/day           S.L.P.      1  hrs/day                    P&O    n/a                                               Excpected Frequency: 5 days/7 day period    Rehab Potential:    fair                                       Estimated Disposition:        home with home therapies             ELOS:        18-21      days      Rationale For Inpatient Rehab Admission- Patient demonstrates the following:     [X] Medically appropriate for rehabilitation admission   [X] Has attainable rehab goals with an approrpriate discharge plan  [X] Has rehabilitation potential (expected to make significant improvement within a reasonable period of time)  [X] Requires close medical management by a rehab physician, rehab nursing care and comprehensive interdisciplinary team (including         PT, OT, SLP and/or prosthetics and orthotics)

## 2018-08-06 NOTE — H&P ADULT - NSHPSOCIALHISTORY_GEN_ALL_CORE
Pt lives in Group home. Pt uses AFO to left foot due to foot deformity. Pt mostly WC bound but does use RW and AFO when ambulating.    No ETOH, illicit drug, or tobacco use.

## 2018-08-06 NOTE — H&P ADULT - HISTORY OF PRESENT ILLNESS
This is a 51 y/o male with history of seizures, MR, and glaucoma found down at his group home. Patient went to bed at 9:30pm and patient was found down at 6:30am. He is nonverbal and has severe agitation at baseline. Patient was taken to [facility] where he was found to have a Left parafrontal epidural and a minimal subdural hematoma with no midline shift. C-spine shows no fractures or subluxations. Patient was intubated for altered mental status and transferred to [facility].   At [facility], pt was admitted to SICU. Neurosurgery was consulted and conservative management undertaken for subdural hemorrhage. Repeat CT imaging was stable. Patient was subsequently weaned from sedation and extubated on 7/27. PT/OT/Rehab services evaluated patient with recommendation for Acute Inpatient Rehab once medically stable. Patient became more lucid once transferred to floor. Central line and arterial line were discontinued. While on floor patient noted to have cellulitis around PEG site which abx were started for on 8/2/18. Total course of 10 days suggested. Patient now stable for discharge with follow up in clinics as outpatient.    Of note patient also noted to be hypernatremic. Started on free water via PEG and showed gradual improvement.     Pt deemed medically stable for d/c to [facility]'s Acute rehab today on 8/6.

## 2018-08-06 NOTE — H&P ADULT - PMH
Blind right eye    Foot deformity, left    Glaucoma    MR (mental retardation)    Seizures    Thrombocytopenia

## 2018-08-06 NOTE — H&P ADULT - NSHPREVIEWOFSYSTEMS_GEN_ALL_CORE
TODAY'S SUBJECTIVE & REVIEW OF SYMPTOMS:    [   ] Constitutional WNL  [   ] Cardio WNL  [   ] Resp WNL  [   ] GI WNL  [   ] Heme WNL  [   ] Endo WNL  [   ] Skin WNL  [   ] MSK WNL  [   ] Neuro WNL  [   ] Cognitive WNL  [   ] Psych WNL TODAY'S SUBJECTIVE & REVIEW OF SYMPTOMS:    Pt seen and evaluated in bed. Pt unable to communicate verbally due to MR. Pt withdraws to pain.   Spoke with , Viky Tao, over the phone. Pt is at baseline per her reports. At baseline patient is easily agitated. Pt needs 1:1 supervision for safety. At baseline, patient has verbal and physical aggressive behaviors. He will strike out at others, attempt to bite, will bite and scratch himself and will throw himself into gore.     Per Viky, when patient becomes agitated, he can be calmed by talking about [employer] the movie/[employer] and he enjoys puzzles.       Any major surgery within past 100 days?   NO  Two or more falls within past one year?     NO  One fall with injury within past one year?   YES

## 2018-08-07 DIAGNOSIS — Z93.1 GASTROSTOMY STATUS: ICD-10-CM

## 2018-08-07 DIAGNOSIS — R89.9 UNSPECIFIED ABNORMAL FINDING IN SPECIMENS FROM OTHER ORGANS, SYSTEMS AND TISSUES: ICD-10-CM

## 2018-08-07 LAB
AMMONIA BLD-MCNC: 65 UMOL/L — HIGH (ref 11–55)
PREALB SERPL-MCNC: 18 MG/DL — LOW (ref 20–40)
VALPROATE SERPL-MCNC: 51 UG/ML — SIGNIFICANT CHANGE UP (ref 50–100)

## 2018-08-07 PROCEDURE — 99232 SBSQ HOSP IP/OBS MODERATE 35: CPT

## 2018-08-07 PROCEDURE — 93010 ELECTROCARDIOGRAM REPORT: CPT

## 2018-08-07 PROCEDURE — 99223 1ST HOSP IP/OBS HIGH 75: CPT

## 2018-08-07 RX ORDER — ASCORBIC ACID 60 MG
500 TABLET,CHEWABLE ORAL
Qty: 0 | Refills: 0 | Status: DISCONTINUED | OUTPATIENT
Start: 2018-08-07 | End: 2018-08-07

## 2018-08-07 RX ORDER — ASCORBIC ACID 60 MG
500 TABLET,CHEWABLE ORAL
Qty: 0 | Refills: 0 | Status: DISCONTINUED | OUTPATIENT
Start: 2018-08-07 | End: 2018-08-15

## 2018-08-07 RX ORDER — ZINC OXIDE 200 MG/G
1 OINTMENT TOPICAL
Qty: 0 | Refills: 0 | Status: DISCONTINUED | OUTPATIENT
Start: 2018-08-07 | End: 2018-08-15

## 2018-08-07 RX ADMIN — NYSTATIN CREAM 1 APPLICATION(S): 100000 CREAM TOPICAL at 06:55

## 2018-08-07 RX ADMIN — GABAPENTIN 300 MILLIGRAM(S): 400 CAPSULE ORAL at 05:42

## 2018-08-07 RX ADMIN — Medication 1 DROP(S): at 11:19

## 2018-08-07 RX ADMIN — CHLORHEXIDINE GLUCONATE 15 MILLILITER(S): 213 SOLUTION TOPICAL at 05:42

## 2018-08-07 RX ADMIN — Medication 4 MILLIGRAM(S): at 22:05

## 2018-08-07 RX ADMIN — Medication 50 MICROGRAM(S): at 05:42

## 2018-08-07 RX ADMIN — PANTOPRAZOLE SODIUM 40 MILLIGRAM(S): 20 TABLET, DELAYED RELEASE ORAL at 11:27

## 2018-08-07 RX ADMIN — NYSTATIN CREAM 1 APPLICATION(S): 100000 CREAM TOPICAL at 17:53

## 2018-08-07 RX ADMIN — Medication 100 MILLIGRAM(S): at 17:51

## 2018-08-07 RX ADMIN — Medication 500 MILLIGRAM(S): at 22:05

## 2018-08-07 RX ADMIN — Medication 1 DROP(S): at 22:04

## 2018-08-07 RX ADMIN — GABAPENTIN 300 MILLIGRAM(S): 400 CAPSULE ORAL at 13:13

## 2018-08-07 RX ADMIN — ENOXAPARIN SODIUM 40 MILLIGRAM(S): 100 INJECTION SUBCUTANEOUS at 11:26

## 2018-08-07 RX ADMIN — Medication 1 DROP(S): at 05:41

## 2018-08-07 RX ADMIN — Medication 500 MILLIGRAM(S): at 17:54

## 2018-08-07 RX ADMIN — Medication 15 MILLILITER(S): at 11:27

## 2018-08-07 RX ADMIN — GABAPENTIN 300 MILLIGRAM(S): 400 CAPSULE ORAL at 22:05

## 2018-08-07 RX ADMIN — Medication 500 MILLIGRAM(S): at 05:43

## 2018-08-07 RX ADMIN — CHLORHEXIDINE GLUCONATE 15 MILLILITER(S): 213 SOLUTION TOPICAL at 17:51

## 2018-08-07 RX ADMIN — Medication 1 DROP(S): at 17:53

## 2018-08-07 RX ADMIN — Medication 0.5 MILLIGRAM(S): at 05:42

## 2018-08-07 RX ADMIN — ZINC OXIDE 1 APPLICATION(S): 200 OINTMENT TOPICAL at 17:52

## 2018-08-07 RX ADMIN — Medication 100 MILLIGRAM(S): at 05:42

## 2018-08-07 RX ADMIN — Medication 0.5 MILLIGRAM(S): at 17:52

## 2018-08-07 RX ADMIN — Medication 1 DROP(S): at 23:37

## 2018-08-07 RX ADMIN — Medication 500 MILLIGRAM(S): at 13:12

## 2018-08-07 RX ADMIN — Medication 2 MILLIGRAM(S): at 22:04

## 2018-08-07 RX ADMIN — QUETIAPINE FUMARATE 25 MILLIGRAM(S): 200 TABLET, FILM COATED ORAL at 22:04

## 2018-08-07 NOTE — DIETITIAN INITIAL EVALUATION ADULT. - PROBLEM SELECTOR PLAN 3
with behavioral problems and agitation. Continue increased depakote dosing. Continue home dose of klonopin.  Schedule seroquel 25mg qHS, and repeat 25mg if agitation or insomnia persists.

## 2018-08-07 NOTE — PROGRESS NOTE ADULT - PROBLEM SELECTOR PLAN 8
Elevated ammonia level - 65.   Will d/c with Dr Beltran. Per conversation with , Viky, yesterday, behaviors exhibited in yesterday's and this mon ring exams are baseline. Pt normally exhibits aggressive behavior hits at others and bites self. Is restless at home and requires constant monitoring. Noted patient tired during Dr Beltran's exam, however alert and seemingly at baseline in exam with Dr Simmons.   Will defer decision for liver US to Dr Beltran.

## 2018-08-07 NOTE — DIETITIAN INITIAL EVALUATION ADULT. - ENERGY NEEDS
Ht: 67 inches Wt: 132 pounds BMI: 20.67 kg/m2 IBW: 148 (+/-10%) 89.2 %IBW  Pertinent information: Pt 53 y/o M with PMH: seizures, mental retardation, glaucoma, blind right eye, thrombocytopenia, admitted S/P TBI, cellulitis, elbow ulcer stage II.   No noted edema as per flow sheets. Skin: wound in left and right wrists; pressure ulcer in right and left elbow stage II as per documentation.

## 2018-08-07 NOTE — DIETITIAN INITIAL EVALUATION ADULT. - DIET TYPE
Available for activation: TwoCal HN through bolus feeds via PEG @240ml/hr every 6 hours provides 960 ml, 1920 kcal, and 80 g protein (32kcal/kg and 1.3 g/kg protein based on current weight 59.9 kg)

## 2018-08-07 NOTE — CONSULT NOTE ADULT - ASSESSMENT
52M with MR from group home with hx of seizure found to have left SDH and left parafrontal epidural hematoma after a fall, now at acute rehab.    #Left SDH   #Left epidural hematoma  #Traumatic Brain Injury  -PT/OT/speech therapy    #Seizure disorder  -Depakote    #Mental Retardation  -Supportive care    #Pressure ulcer / stage 2 decubiti bilateral elbows  -Does not appear infectious at this time (however, patient already on doxycycline for cellulitis of PEG site - which is schedule to end after ten total days of Abx)  -Topical routine wound care to sites of skin tears    DVT ppx: Lovenox 52M with MR from group home with hx of seizure found to have left SDH and left parafrontal epidural hematoma after a fall, now at acute rehab.    #Left SDH   #Left epidural hematoma  #Traumatic Brain Injury  -PT/OT/speech therapy    #Seizure disorder  -Depakote    #Mental Retardation  -Supportive care    #Pressure ulcer / stage 2 decubiti bilateral elbows  -Does not appear infectious at this time (however, patient already on doxycycline for cellulitis of PEG site - which is schedule to end after ten total days of Abx)  -Topical routine wound care to sites of skin tears    #High ammonia level  -This is of undetermined significance. Must find out patient's baseline mental state - as patient seemed tired during my exam. IF patient more tired/altered than usual, would benefit from lactulose and an ultrasound of the liver to see why ammonia level is elevated. If patient at his baseline mental state, the ammonia level becomes less significant.     DVT ppx: Lovenox

## 2018-08-07 NOTE — DIETITIAN INITIAL EVALUATION ADULT. - OTHER INFO
Nutrition consult received for tube feeding and pressure ulcer >2. PCA denies pt with GI distress at this time, reports pt tolerating feedings, reports last BM PTA. No noted EN provision as per documentation at this time. Weight as per previous RD note (07/27/2018) 136.6 pounds. Current weight as per flow sheets (08/06) 132 pounds -?accuracy of weight changes, recommend new weight.

## 2018-08-07 NOTE — DIETITIAN INITIAL EVALUATION ADULT. - ORAL INTAKE PTA
Noted pt was receiving Jevity 1.5 @55 ml/hr x 20 hours as per previous RD note (07/27/2018). Pt taking MVI and levothyroxine PTA as per chart.

## 2018-08-07 NOTE — PROGRESS NOTE ADULT - SUBJECTIVE AND OBJECTIVE BOX
History of Present Illness: 	  This is a 51 y/o male with history of seizures, MR, and glaucoma found down at his group home. Patient went to bed at 9:30pm and patient was found down at 6:30am. He is nonverbal and has severe agitation at baseline. Patient was taken to [facility] where he was found to have a Left parafrontal epidural and a minimal subdural hematoma with no midline shift. C-spine shows no fractures or subluxations. Patient was intubated for altered mental status and transferred to [facility].   At [facility], pt was admitted to SICU. Neurosurgery was consulted and conservative management undertaken for subdural hemorrhage. Repeat CT imaging was stable. Patient was subsequently weaned from sedation and extubated on 7/27. PT/OT/Rehab services evaluated patient with recommendation for Acute Inpatient Rehab once medically stable. Patient became more lucid once transferred to floor. Central line and arterial line were discontinued. While on floor patient noted to have cellulitis around PEG site which abx were started for on 8/2/18. Total course of 10 days suggested. Patient now stable for discharge with follow up in clinics as outpatient.    Of note patient also noted to be hypernatremic. Started on free water via PEG and showed gradual improvement.     Pt deemed medically stable for d/c to [facility]'s Acute rehab today on 8/6.       Review of Systems:  TODAY'S SUBJECTIVE & REVIEW OF SYMPTOMS:   Pt seen and evaluated in . Pt cooperative with assessment of lungs, heart, and abdomen. Pt restless and biting to fingers. Pt is on 1:1 observation. Patient remained restless overnight per staff. 	      Allergies and Intolerances:        Allergies:  	sulfa eye drop, unknown reaction           Past Medical History:  Blind right eye    Foot deformity, left    Glaucoma    MR (mental retardation)    Seizures    Thrombocytopenia.    Past Surgical History:  S/P hernia repair.        Physical Exam: Mental Status - Patient is awake, minimally verbal--able to say "yes" or "no". Oriented to self--(via y/n answers), disoriented to place and time.   Cranial Nerves -unable to assess   Motor Exam - No noticeable focal deficits, moves all extremities equally  Sensory: unable to fully assess LT, withdraws from painful stimuli  Coord: unable to assess  Gait -  unable to assess             HEENT:  normocephalic, right eye closed--eyeball immobile    LUNGS: Clear bilaterally    HEART:  Normal S1S2   No murmur RRR        GI/ ABDOMEN:  Soft, erythema noted around peg tube insertion site  EXTREMITIES:   bilat hand edema-improving and bruising  SKIN: Partial thickness wounds to bilateral elbows-dressed. Buttocks unassessed this morning. Scattered healing scratches to arms and torso.	  	        Labs and Results:  08-06  143  |  109<H>  |  22 ----------------------------<  118<H> 3.7   |  29  |  0.64  Ca    8.7      06 Aug 2018 19:10  TPro  7.3  /  Alb  2.9<L>  /  TBili  0.4  /  DBili  x   /  AST  44<H>  /  ALT  45  /  AlkPhos  102  08-06                      11.1  4.6   )-----------( 328      ( 06 Aug 2018 19:10 )            34.4   Ammonia 65 Pending depakote level 	    Functional Exam:   Gait: Eval  Transfers: Eval  ADLs: Eval History of Present Illness: 	  This is a 51 y/o male with history of seizures, MR, and glaucoma found down at his group home. Patient went to bed at 9:30pm and patient was found down at 6:30am. He is nonverbal and has severe agitation at baseline. Patient was taken to [facility] where he was found to have a Left parafrontal epidural and a minimal subdural hematoma with no midline shift. C-spine shows no fractures or subluxations. Patient was intubated for altered mental status and transferred to [facility].   At [facility], pt was admitted to SICU. Neurosurgery was consulted and conservative management undertaken for subdural hemorrhage. Repeat CT imaging was stable. Patient was subsequently weaned from sedation and extubated on 7/27. PT/OT/Rehab services evaluated patient with recommendation for Acute Inpatient Rehab once medically stable. Patient became more lucid once transferred to floor. Central line and arterial line were discontinued. While on floor patient noted to have cellulitis around PEG site which abx were started for on 8/2/18. Total course of 10 days suggested. Patient now stable for discharge with follow up in clinics as outpatient.    Of note patient also noted to be hypernatremic. Started on free water via PEG and showed gradual improvement.     Pt deemed medically stable for d/c to [facility]'s Acute rehab today on 8/6.       Review of Systems:  TODAY'S SUBJECTIVE & REVIEW OF SYMPTOMS:   Pt seen and evaluated in . Pt cooperative with assessment of lungs, heart, and abdomen. Pt restless and biting to fingers. Pt is on 1:1 observation. Patient slept last night as per overnight per staff. Restless with staff, but when  present, he follows commands and is more cooperative with her. 	      Allergies and Intolerances:        Allergies:  	sulfa eye drop, unknown reaction           Past Medical History:  Blind right eye    Foot deformity, left    Glaucoma    MR (mental retardation)    Seizures    Thrombocytopenia.    Past Surgical History:  S/P hernia repair.        Physical Exam: Mental Status - Patient is awake, minimally verbal--able to say "yes" or "no". Oriented to self--(via y/n answers), disoriented to place and time.  Neuro exam limited by impaired command following.   Cranial Nerves -right eye paralysis, non-reactive.  Left eye movement appears WNL, no facial asymmetry, + tongue mobility  Motor Exam - No noticeable focal deficits, moves all extremities equally  Sensory: unable to fully assess LT, withdraws from painful stimuli  Coord: unable to fully assess--impaired   HEENT:  normocephalic, right eye closed--eyeball immobile    LUNGS: Clear bilaterally    HEART:  Normal S1S2   No murmur RRR        GI/ ABDOMEN:  Soft, erythema noted around peg tube insertion site  EXTREMITIES:   bilat hand edema-improving and bruising  SKIN: Partial thickness wounds to bilateral elbows-dressed. Buttocks unassessed this morning. Scattered healing scratches to arms and torso.	  	        Labs and Results:  08-06  143  |  109<H>  |  22 ----------------------------<  118<H> 3.7   |  29  |  0.64  Ca    8.7      06 Aug 2018 19:10  TPro  7.3  /  Alb  2.9<L>  /  TBili  0.4  /  DBili  x   /  AST  44<H>  /  ALT  45  /  AlkPhos  102  08-06                      11.1  4.6   )-----------( 328      ( 06 Aug 2018 19:10 )            34.4   Ammonia 65 Pending depakote level 	    Functional Exam:   Gait: Eval  Transfers: Eval  ADLs: Eval

## 2018-08-07 NOTE — DIETITIAN INITIAL EVALUATION ADULT. - PT NOT SOURCE
Attempted to interview multiple times - pt sleeping, confused and with mental retardation as per chart; no family at bedside; limited information obtained

## 2018-08-07 NOTE — CONSULT NOTE ADULT - SUBJECTIVE AND OBJECTIVE BOX
Patient is a 52y old Male who presents with a chief complaint of TBI (06 Aug 2018 15:03)    HPI:  Unable to get history from patient, who is minimally verbal. All information obtained from chart review.    Per chart review, this is a 53 y/o male with history of seizures, MR, and glaucoma found down at his group home. Patient went to bed at 9:30pm and patient was found down at 6:30am. He is nonverbal and has severe agitation at baseline. Patient was taken to [facility] where he was found to have a Left parafrontal epidural and a minimal subdural hematoma with no midline shift. C-spine shows no fractures or subluxations. Patient was intubated for altered mental status and transferred to [facility]. At [facility], pt was admitted to SICU. Neurosurgery was consulted and conservative management undertaken for subdural hemorrhage. Repeat CT imaging was stable. Patient was subsequently weaned from sedation and extubated on 7/27. PT/OT/Rehab services evaluated patient with recommendation for Acute Inpatient Rehab once medically stable. Patient became more lucid once transferred to floor. Central line and arterial line were discontinued. While on floor patient noted to have cellulitis around PEG site which abx were started for on 8/2/18. Total course of 10 days suggested. Patient now stable for discharge with follow up in clinics as outpatient.      PAST MEDICAL & SURGICAL HISTORY:  Foot deformity, left  Thrombocytopenia  Blind right eye  MR (mental retardation)  Glaucoma  Seizures  S/P hernia repair    SOCIAL HISTORY:  Unable to obtain secondary to minimally verbal state    Family history: unable to obtain secondary to minimally verbal state    ALLERGIES:  sulfiSOXAZOLE ophthalmic (Unknown)    MEDICATIONS  (STANDING):  artificial  tears Solution 1 Drop(s) Right EYE every 3 hours  chlorhexidine 0.12% Liquid 15 milliLiter(s) Swish and Spit two times a day  clonazePAM Tablet 0.5 milliGRAM(s) Oral two times a day  clonazePAM Tablet 2 milliGRAM(s) Oral at bedtime  doxazosin 4 milliGRAM(s) Oral at bedtime  doxycycline hyclate Capsule 100 milliGRAM(s) Oral every 12 hours  enoxaparin Injectable 40 milliGRAM(s) SubCutaneous daily  gabapentin   Solution 300 milliGRAM(s) Oral three times a day  levothyroxine 50 MICROGram(s) Oral daily  multivitamin  Drops 15 milliLiter(s) Oral daily  nystatin Ointment 1 Application(s) Topical two times a day  pantoprazole    Tablet 40 milliGRAM(s) Oral daily  QUEtiapine 25 milliGRAM(s) Oral at bedtime  tiotropium 18 MICROgram(s) Capsule 1 Capsule(s) Inhalation daily  valproic  acid Syrup 500 milliGRAM(s) Oral every 8 hours    MEDICATIONS  (PRN):  QUEtiapine 25 milliGRAM(s) Oral at bedtime PRN give if agitation or insomnia continues 1 hour after scheduled dose    Review of Systems: Unable to obtain secondary to minimally verbal state    Vital Signs Last 24 Hrs  T(F): 98.6 (07 Aug 2018 08:00), Max: 98.6 (07 Aug 2018 08:00)  HR: 83 (07 Aug 2018 08:00) (66 - 83)  BP: 115/65 (07 Aug 2018 08:00) (100/67 - 122/78)  RR: 14 (07 Aug 2018 08:00) (14 - 19)  SpO2: 96% (07 Aug 2018 08:00) (96% - 100%)  I&O's Summary    PHYSICAL EXAM:  GENERAL: NAD  HEAD:  Normocephalic  EYES: Right eye is surgically absent, left eye conjunctiva clear  ENMT: Dry lips, tongue is moist  NECK: Supple, No JVD  CHEST/LUNG: Clear to auscultation bilaterally  HEART: Regular rate and rhythm; S1/S2, No murmurs  ABDOMEN: Soft, Nontender, Nondistended; Bowel sounds present; PEG+  VASCULAR: Normal pulses, Normal capillary refill  EXTREMITIES: No edema, left ankle is contracted  LYMPH: No lymphadenopathy noted  SKIN: Warm, superficial skin tears bilateral forearm, bilateral elbow skin tears  NERVOUS SYSTEM:  Limited exam as patient sleeping    LABS:                        11.1   4.6   )-----------( 328      ( 06 Aug 2018 19:10 )             34.4     08-06    143  |  109  |  22  ----------------------------<  118  3.7   |  29  |  0.64    Ca    8.7      06 Aug 2018 19:10  Phos  3.3     08-05  Mg     1.9     08-05    TPro  7.3  /  Alb  2.9  /  TBili  0.4  /  DBili  x   /  AST  44  /  ALT  45  /  AlkPhos  102  08-06    eGFR if Non African American: 111 mL/min/1.73M2 (08-06-18 @ 19:10)  eGFR if : 129 mL/min/1.73M2 (08-06-18 @ 19:10)      CAPILLARY BLOOD GLUCOSE    07-27 [id number] 4.5    RADIOLOGY & ADDITIONAL TESTS:   < from: 12 Lead ECG (08.07.18 @ 08:49) >  Diagnosis Line Normal sinus rhythm  Low voltage in limb leads.  Counterclockwise rotation  Left anterior fascicular block  Abnormal ECG  No previous ECGs available    < end of copied text >    EKG REVIEWED BY ME PERSONALLY!    Care Discussed with Consultants/Other Providers: Dr. Simmons Patient is a 52y old Male who presents with a chief complaint of TBI (06 Aug 2018 15:03)    HPI:  Unable to get history from patient, who is minimally verbal. All information obtained from chart review.    Per chart review, this is a 53 y/o male with history of seizures, MR, and glaucoma found down at his group home. Patient went to bed at 9:30pm and patient was found down at 6:30am. He is nonverbal and has severe agitation at baseline. Patient was taken to [facility] where he was found to have a Left parafrontal epidural and a minimal subdural hematoma with no midline shift. C-spine shows no fractures or subluxations. Patient was intubated for altered mental status and transferred to [facility]. At [facility], pt was admitted to SICU. Neurosurgery was consulted and conservative management undertaken for subdural hemorrhage. Repeat CT imaging was stable. Patient was subsequently weaned from sedation and extubated on 7/27. PT/OT/Rehab services evaluated patient with recommendation for Acute Inpatient Rehab once medically stable. Patient became more lucid once transferred to floor. Central line and arterial line were discontinued. While on floor patient noted to have cellulitis around PEG site which abx were started for on 8/2/18. Total course of 10 days suggested. Patient now stable for discharge with follow up in clinics as outpatient.      PAST MEDICAL & SURGICAL HISTORY:  Foot deformity, left  Thrombocytopenia  Blind right eye  MR (mental retardation)  Glaucoma  Seizures  S/P hernia repair    SOCIAL HISTORY:  Unable to obtain secondary to minimally verbal state    Family history: unable to obtain secondary to minimally verbal state    ALLERGIES:  sulfiSOXAZOLE ophthalmic (Unknown)    MEDICATIONS  (STANDING):  artificial  tears Solution 1 Drop(s) Right EYE every 3 hours  chlorhexidine 0.12% Liquid 15 milliLiter(s) Swish and Spit two times a day  clonazePAM Tablet 0.5 milliGRAM(s) Oral two times a day  clonazePAM Tablet 2 milliGRAM(s) Oral at bedtime  doxazosin 4 milliGRAM(s) Oral at bedtime  doxycycline hyclate Capsule 100 milliGRAM(s) Oral every 12 hours  enoxaparin Injectable 40 milliGRAM(s) SubCutaneous daily  gabapentin   Solution 300 milliGRAM(s) Oral three times a day  levothyroxine 50 MICROGram(s) Oral daily  multivitamin  Drops 15 milliLiter(s) Oral daily  nystatin Ointment 1 Application(s) Topical two times a day  pantoprazole    Tablet 40 milliGRAM(s) Oral daily  QUEtiapine 25 milliGRAM(s) Oral at bedtime  tiotropium 18 MICROgram(s) Capsule 1 Capsule(s) Inhalation daily  valproic  acid Syrup 500 milliGRAM(s) Oral every 8 hours    MEDICATIONS  (PRN):  QUEtiapine 25 milliGRAM(s) Oral at bedtime PRN give if agitation or insomnia continues 1 hour after scheduled dose    Review of Systems: Unable to obtain secondary to minimally verbal state    Vital Signs Last 24 Hrs  T(F): 98.6 (07 Aug 2018 08:00), Max: 98.6 (07 Aug 2018 08:00)  HR: 83 (07 Aug 2018 08:00) (66 - 83)  BP: 115/65 (07 Aug 2018 08:00) (100/67 - 122/78)  RR: 14 (07 Aug 2018 08:00) (14 - 19)  SpO2: 96% (07 Aug 2018 08:00) (96% - 100%)  I&O's Summary    PHYSICAL EXAM:  GENERAL: NAD  HEAD:  Normocephalic  EYES: Right eye is surgically absent, left eye conjunctiva clear  ENMT: Dry lips, tongue is moist  NECK: Supple, No JVD  CHEST/LUNG: Clear to auscultation bilaterally  HEART: Regular rate and rhythm; S1/S2, No murmurs  ABDOMEN: Soft, Nontender, Nondistended; Bowel sounds present; PEG+  VASCULAR: Normal pulses, Normal capillary refill  EXTREMITIES: No edema, left ankle is contracted  LYMPH: No lymphadenopathy noted  SKIN: Warm, superficial skin tears bilateral forearm, bilateral elbow skin tears  NERVOUS SYSTEM:  Limited exam as patient sleeping    LABS:                        11.1   4.6   )-----------( 328      ( 06 Aug 2018 19:10 )             34.4     08-06    143  |  109  |  22  ----------------------------<  118  3.7   |  29  |  0.64    Ca    8.7      06 Aug 2018 19:10  Phos  3.3     08-05  Mg     1.9     08-05    TPro  7.3  /  Alb  2.9  /  TBili  0.4  /  DBili  x   /  AST  44  /  ALT  45  /  AlkPhos  102  08-06    eGFR if Non African American: 111 mL/min/1.73M2 (08-06-18 @ 19:10)  eGFR if : 129 mL/min/1.73M2 (08-06-18 @ 19:10)    Ammonia: 65    CAPILLARY BLOOD GLUCOSE    07-27 [id number] 4.5    RADIOLOGY & ADDITIONAL TESTS:   < from: 12 Lead ECG (08.07.18 @ 08:49) >  Diagnosis Line Normal sinus rhythm  Low voltage in limb leads.  Counterclockwise rotation  Left anterior fascicular block  Abnormal ECG  No previous ECGs available    < end of copied text >    EKG REVIEWED BY ME PERSONALLY!    Care Discussed with Consultants/Other Providers: Dr. Simmons

## 2018-08-08 PROCEDURE — 99232 SBSQ HOSP IP/OBS MODERATE 35: CPT

## 2018-08-08 RX ADMIN — NYSTATIN CREAM 1 APPLICATION(S): 100000 CREAM TOPICAL at 17:13

## 2018-08-08 RX ADMIN — Medication 15 MILLILITER(S): at 13:24

## 2018-08-08 RX ADMIN — Medication 500 MILLIGRAM(S): at 05:38

## 2018-08-08 RX ADMIN — GABAPENTIN 300 MILLIGRAM(S): 400 CAPSULE ORAL at 21:56

## 2018-08-08 RX ADMIN — Medication 1 DROP(S): at 05:37

## 2018-08-08 RX ADMIN — NYSTATIN CREAM 1 APPLICATION(S): 100000 CREAM TOPICAL at 05:38

## 2018-08-08 RX ADMIN — CHLORHEXIDINE GLUCONATE 15 MILLILITER(S): 213 SOLUTION TOPICAL at 05:40

## 2018-08-08 RX ADMIN — Medication 0.5 MILLIGRAM(S): at 17:12

## 2018-08-08 RX ADMIN — QUETIAPINE FUMARATE 25 MILLIGRAM(S): 200 TABLET, FILM COATED ORAL at 21:55

## 2018-08-08 RX ADMIN — GABAPENTIN 300 MILLIGRAM(S): 400 CAPSULE ORAL at 13:52

## 2018-08-08 RX ADMIN — ENOXAPARIN SODIUM 40 MILLIGRAM(S): 100 INJECTION SUBCUTANEOUS at 11:03

## 2018-08-08 RX ADMIN — Medication 1 DROP(S): at 11:03

## 2018-08-08 RX ADMIN — Medication 500 MILLIGRAM(S): at 05:39

## 2018-08-08 RX ADMIN — PANTOPRAZOLE SODIUM 40 MILLIGRAM(S): 20 TABLET, DELAYED RELEASE ORAL at 11:04

## 2018-08-08 RX ADMIN — Medication 500 MILLIGRAM(S): at 21:55

## 2018-08-08 RX ADMIN — Medication 100 MILLIGRAM(S): at 05:39

## 2018-08-08 RX ADMIN — Medication 50 MICROGRAM(S): at 05:39

## 2018-08-08 RX ADMIN — Medication 500 MILLIGRAM(S): at 17:12

## 2018-08-08 RX ADMIN — Medication 2 MILLIGRAM(S): at 21:55

## 2018-08-08 RX ADMIN — ZINC OXIDE 1 APPLICATION(S): 200 OINTMENT TOPICAL at 17:13

## 2018-08-08 RX ADMIN — Medication 1 DROP(S): at 17:12

## 2018-08-08 RX ADMIN — ZINC OXIDE 1 APPLICATION(S): 200 OINTMENT TOPICAL at 05:42

## 2018-08-08 RX ADMIN — GABAPENTIN 300 MILLIGRAM(S): 400 CAPSULE ORAL at 05:40

## 2018-08-08 RX ADMIN — Medication 500 MILLIGRAM(S): at 13:52

## 2018-08-08 RX ADMIN — Medication 0.5 MILLIGRAM(S): at 05:39

## 2018-08-08 RX ADMIN — Medication 100 MILLIGRAM(S): at 17:13

## 2018-08-08 RX ADMIN — Medication 4 MILLIGRAM(S): at 21:55

## 2018-08-08 RX ADMIN — CHLORHEXIDINE GLUCONATE 15 MILLILITER(S): 213 SOLUTION TOPICAL at 17:12

## 2018-08-08 RX ADMIN — Medication 1 DROP(S): at 21:55

## 2018-08-08 NOTE — PROGRESS NOTE ADULT - ASSESSMENT
This is a 51 y/o male with history of seizures and MR with new left parafrontal epidural and a minimal subdural hematoma/TBI now with functional deficits after sustaining fall.       Continue comprehensive PT, OT and ST program.

## 2018-08-08 NOTE — PROGRESS NOTE ADULT - SUBJECTIVE AND OBJECTIVE BOX
HPI:  This is a 51 y/o male with history of seizures, MR, and glaucoma found down at his group home. Patient went to bed at 9:30pm and patient was found down at 6:30am. He is nonverbal and has severe agitation at baseline. Patient was taken to [facility] where he was found to have a Left parafrontal epidural and a minimal subdural hematoma with no midline shift. C-spine shows no fractures or subluxations. Patient was intubated for altered mental status and transferred to [facility].   At [facility], pt was admitted to SICU. Neurosurgery was consulted and conservative management undertaken for subdural hemorrhage. Repeat CT imaging was stable. Patient was subsequently weaned from sedation and extubated on 7/27. PT/OT/Rehab services evaluated patient with recommendation for Acute Inpatient Rehab once medically stable. Patient became more lucid once transferred to floor. Central line and arterial line were discontinued. While on floor patient noted to have cellulitis around PEG site which abx were started for on 8/2/18. Total course of 10 days suggested. Patient now stable for discharge with follow up in clinics as outpatient.      PAST MEDICAL & SURGICAL HISTORY:  Foot deformity, left  Thrombocytopenia  Blind right eye  MR (mental retardation)  Glaucoma  Seizures  S/P hernia repair      Subjective: Slept during night.  No acute agitation events. Pt. cooperative.       VITALS  Vital Signs Last 24 Hrs  T(C): 36.3 (08 Aug 2018 09:33), Max: 36.4 (07 Aug 2018 22:02)  T(F): 97.4 (08 Aug 2018 09:33), Max: 97.6 (07 Aug 2018 22:02)  HR: 77 (08 Aug 2018 09:33) (67 - 77)  BP: 106/72 (08 Aug 2018 09:33) (103/70 - 106/72)  BP(mean): --  RR: 15 (08 Aug 2018 09:33) (14 - 15)  SpO2: 98% (08 Aug 2018 09:33) (98% - 98%)      PHYSICAL EXAM  Mental Status - Patient is awake, minimally verbal--able to say "yes" or "no". Oriented to self--(via y/n answers), disoriented to place and time.   Neuro exam limited by impaired command following.   	Cranial Nerves -right eye paralysis, non-reactive.  Left eye movement appears WNL, no facial asymmetry, + tongue mobility  	Motor Exam - No noticeable focal deficits, moves all extremities equally  	Sensory: unable to fully assess LT, withdraws from painful stimuli  	Coord: unable to fully assess--impaired    	HEENT:  normocephalic, right eye closed--eyeball immobile		  	LUNGS:	Clear bilaterally 	  	HEART:	 Normal S1S2   No murmur RRR        	GI/ ABDOMEN:  Soft, erythema noted around peg tube insertion site  	EXTREMITIES:   bilat hand edema-improving and bruising   SKIN: Partial thickness wounds to bilateral elbows-dressed. Buttocks unassessed this morning. Scattered healing scratches to arms and torso    FUNCTIONAL PROGRESS  Gait - Wheelchair mobility 65 ft with mod A  ADLs: max A  Functional transfer - Mod A.   Speech therapy reports-- notes significant decline in receptive and expressive language and speech intelligibility from baseline.  At baseline pt. has mental status of 5-7 year old,  severe dysarthria, but able to communicate wants and needs. NPO with PEG    RECENT LABS                        11.1   4.6   )-----------( 328      ( 06 Aug 2018 19:10 )             34.4     08-06    143  |  109<H>  |  22  ----------------------------<  118<H>  3.7   |  29  |  0.64    Ca    8.7      06 Aug 2018 19:10    TPro  7.3  /  Alb  2.9<L>  /  TBili  0.4  /  DBili  x   /  AST  44<H>  /  ALT  45  /  AlkPhos  102  08-06      Depakote Level: 51 (therapeutic)      RADIOLOGY/OTHER RESULTS      MEDICATIONS  (STANDING):  artificial  tears Solution 1 Drop(s) Right EYE every 3 hours  ascorbic acid 500 milliGRAM(s) Oral two times a day  chlorhexidine 0.12% Liquid 15 milliLiter(s) Swish and Spit two times a day  clonazePAM Tablet 0.5 milliGRAM(s) Oral two times a day  clonazePAM Tablet 2 milliGRAM(s) Oral at bedtime  doxazosin 4 milliGRAM(s) Oral at bedtime  doxycycline hyclate Capsule 100 milliGRAM(s) Oral every 12 hours  enoxaparin Injectable 40 milliGRAM(s) SubCutaneous daily  gabapentin   Solution 300 milliGRAM(s) Oral three times a day  levothyroxine 50 MICROGram(s) Oral daily  multivitamin  Drops 15 milliLiter(s) Oral daily  nystatin Ointment 1 Application(s) Topical two times a day  pantoprazole    Tablet 40 milliGRAM(s) Oral daily  QUEtiapine 25 milliGRAM(s) Oral at bedtime  valproic  acid Syrup 500 milliGRAM(s) Oral every 8 hours  zinc oxide 20% Ointment 1 Application(s) Topical two times a day    MEDICATIONS  (PRN):  QUEtiapine 25 milliGRAM(s) Oral at bedtime PRN give if agitation or insomnia continues 1 hour after scheduled dose

## 2018-08-08 NOTE — PROGRESS NOTE ADULT - SUBJECTIVE AND OBJECTIVE BOX
PLAN OF CARE       REHABILITATION DIAGNOSIS/IMPAIRMENT GROUP CODE:  This is a 51 y/o male with history of seizures and MR with new left parafrontal epidural and a minimal subdural hematoma/TBI now with functional deficits after sustaining fall.       COMORBIDITIES/COMPLICATING CONDITIONS IMPACTING REHABILITATION:  HEALTH ISSUES - PROBLEM Dx:  PEG (percutaneous endoscopic gastrostomy) status: PEG (percutaneous endoscopic gastrostomy) status  Decubitus ulcer of elbow, stage 2, unspecified laterality: Decubitus ulcer of elbow, stage 2, unspecified laterality  Contact dermatitis: Contact dermatitis  Cellulitis: Cellulitis  MR (mental retardation): MR (mental retardation)  Seizures: Seizures  TBI (traumatic brain injury): TBI (traumatic brain injury)        PAST MEDICAL & SURGICAL HISTORY:  Foot deformity, left  Thrombocytopenia  Blind right eye  MR (mental retardation)  Glaucoma  Seizures  S/P hernia repair      Based upon consideration of the patient's impairments, functional status, complicating conditions and any other contributing factors and after information garnered from the assessments of all therapy disciplines involved in treating the patient and other pertinent clinicians:    INTERDISCIPLINARY REHABILITATION INTERVENTIONS:    [ x  ] Transfer Training  [ x  ] Bed Mobility  [ x  ] Therapeutic Exercise  [ x  ] Balance/Coordination Exercises  [ x  ] Locomotion retraining  [   ] Stairs  [ x  ] Functional Transfer Training  [ x  ] Bowel/Bladder program  [ x  ] Pain Management  [x   ] Skin/Wound Care  [ x  ] Visual/Perceptual Training  [ x  ] Therapeutic Recreation Activities  [ x  ] Neuromuscular Re-education  [ x  ] Activities of Daily Living  [ x  ] Speech Exercise  [ x  ] Swallowing Exercises  [   ] Vital Stim  [x   ] Dietary Supplements  [   ] Calorie Count  [ x  ] Cognitive Exercises  [  x ] Congnitive/Linguistic Treatment  [ x  ] Behavior Program  [   ] Neuropsych Therapy  [ x  ] Patient/Family Counseling  [ x  ] Family Training  [ x  ] Community Re-entry  [   ] Orthotic Evaluation  [   ] Prosthetic Eval/Training    MEDICAL PROGNOSIS:  Good    REHAB POTENTIAL:  Fair  EXPECTED DAILY THERAPY:         PT: 1hr       OT: 1hr       ST: 1hr       S&O: n/a    EXPECTED INTENSITY OF PROGRAM:  3 hours a day     EXPECTED FREQUENCY OF PROGRAM:  5 days a week     ESTIMATED LOS:  18-21 days     ESTIMATED DISPOSITION:  home to group home with home services     INTERDISCIPLINARY FUNCTIONAL OUTCOMES/GOALS:         Gait/Mobility: WC mobility with supervision        Transfers: CG       ADLs: min A        Functional Transfers: CG       Medication Management: dependent        Communication: mod I        Cognitive: mod I       Dysphagia: NPO, on tube feeds secondary to silent aspiration        Bladder min A       Bowel: min A

## 2018-08-08 NOTE — PROGRESS NOTE ADULT - PROBLEM SELECTOR PLAN 3
with behavioral problems and agitation. Continue  depakote. Continue home dose of klonopin.  Seroquel 25mg qHS, and repeat 25mg if agitation or insomnia persists.  Cont. 1:1 supervision and hand mitts for safety.

## 2018-08-09 PROCEDURE — 99232 SBSQ HOSP IP/OBS MODERATE 35: CPT

## 2018-08-09 RX ORDER — PANTOPRAZOLE SODIUM 20 MG/1
40 TABLET, DELAYED RELEASE ORAL DAILY
Qty: 0 | Refills: 0 | Status: DISCONTINUED | OUTPATIENT
Start: 2018-08-09 | End: 2018-08-15

## 2018-08-09 RX ADMIN — Medication 100 MILLIGRAM(S): at 06:12

## 2018-08-09 RX ADMIN — QUETIAPINE FUMARATE 25 MILLIGRAM(S): 200 TABLET, FILM COATED ORAL at 21:57

## 2018-08-09 RX ADMIN — Medication 100 MILLIGRAM(S): at 18:27

## 2018-08-09 RX ADMIN — Medication 15 MILLILITER(S): at 12:49

## 2018-08-09 RX ADMIN — Medication 500 MILLIGRAM(S): at 18:27

## 2018-08-09 RX ADMIN — GABAPENTIN 300 MILLIGRAM(S): 400 CAPSULE ORAL at 06:15

## 2018-08-09 RX ADMIN — ENOXAPARIN SODIUM 40 MILLIGRAM(S): 100 INJECTION SUBCUTANEOUS at 12:49

## 2018-08-09 RX ADMIN — Medication 500 MILLIGRAM(S): at 06:13

## 2018-08-09 RX ADMIN — Medication 2 MILLIGRAM(S): at 21:56

## 2018-08-09 RX ADMIN — NYSTATIN CREAM 1 APPLICATION(S): 100000 CREAM TOPICAL at 06:12

## 2018-08-09 RX ADMIN — Medication 1 DROP(S): at 00:34

## 2018-08-09 RX ADMIN — Medication 50 MICROGRAM(S): at 06:12

## 2018-08-09 RX ADMIN — Medication 1 DROP(S): at 08:31

## 2018-08-09 RX ADMIN — Medication 0.5 MILLIGRAM(S): at 06:12

## 2018-08-09 RX ADMIN — Medication 0.5 MILLIGRAM(S): at 18:27

## 2018-08-09 RX ADMIN — Medication 1 DROP(S): at 12:49

## 2018-08-09 RX ADMIN — CHLORHEXIDINE GLUCONATE 15 MILLILITER(S): 213 SOLUTION TOPICAL at 18:27

## 2018-08-09 RX ADMIN — ZINC OXIDE 1 APPLICATION(S): 200 OINTMENT TOPICAL at 06:13

## 2018-08-09 RX ADMIN — CHLORHEXIDINE GLUCONATE 15 MILLILITER(S): 213 SOLUTION TOPICAL at 06:13

## 2018-08-09 RX ADMIN — Medication 1 DROP(S): at 21:56

## 2018-08-09 RX ADMIN — Medication 500 MILLIGRAM(S): at 13:17

## 2018-08-09 RX ADMIN — GABAPENTIN 300 MILLIGRAM(S): 400 CAPSULE ORAL at 22:59

## 2018-08-09 RX ADMIN — GABAPENTIN 300 MILLIGRAM(S): 400 CAPSULE ORAL at 13:17

## 2018-08-09 RX ADMIN — Medication 500 MILLIGRAM(S): at 06:16

## 2018-08-09 RX ADMIN — Medication 1 DROP(S): at 06:10

## 2018-08-09 RX ADMIN — Medication 4 MILLIGRAM(S): at 21:57

## 2018-08-09 RX ADMIN — ZINC OXIDE 1 APPLICATION(S): 200 OINTMENT TOPICAL at 18:28

## 2018-08-09 RX ADMIN — Medication 500 MILLIGRAM(S): at 21:57

## 2018-08-09 RX ADMIN — PANTOPRAZOLE SODIUM 40 MILLIGRAM(S): 20 TABLET, DELAYED RELEASE ORAL at 18:27

## 2018-08-09 RX ADMIN — NYSTATIN CREAM 1 APPLICATION(S): 100000 CREAM TOPICAL at 18:28

## 2018-08-09 RX ADMIN — Medication 1 DROP(S): at 18:27

## 2018-08-09 NOTE — PROGRESS NOTE ADULT - ASSESSMENT
This is a 51 y/o male with history of seizures and MR with new left parafrontal epidural and a minimal subdural hematoma/TBI now with functional deficits after sustaining fall.       Continue comprehensive PT, OT and ST program.       IDT 8/9/18--Pt. with PT/OT function as above.  d/c planning 8/15.  Spoke with Viky Rivera regarding training on Tuesday. SW to f/u.

## 2018-08-09 NOTE — PROGRESS NOTE ADULT - SUBJECTIVE AND OBJECTIVE BOX
HPI:  This is a 51 y/o male with history of seizures, MR, and glaucoma found down at his group home. Patient went to bed at 9:30pm and patient was found down at 6:30am. He is nonverbal and has severe agitation at baseline. Patient was taken to [facility] where he was found to have a Left parafrontal epidural and a minimal subdural hematoma with no midline shift. C-spine shows no fractures or subluxations. Patient was intubated for altered mental status and transferred to [facility].   At [facility], pt was admitted to SICU. Neurosurgery was consulted and conservative management undertaken for subdural hemorrhage. Repeat CT imaging was stable. Patient was subsequently weaned from sedation and extubated on 7/27. PT/OT/Rehab services evaluated patient with recommendation for Acute Inpatient Rehab once medically stable. Patient became more lucid once transferred to floor. Central line and arterial line were discontinued. While on floor patient noted to have cellulitis around PEG site which abx were started for on 8/2/18. Total course of 10 days suggested. Patient now stable for discharge with follow up in clinics as outpatient.    Of note patient also noted to be hypernatremic. Started on free water via PEG and showed gradual improvement.             Subjective:  1:1 states reported pt. did not sleep much last night.  Pt. sleeping but arousable.  cooperating with therapy this AM.  No agitation this AM. Spoke with Viky Rivera -pt's -to clarify pt. function--as per [facility] chart, pt. was non-verbal a baseline-as per her, pt. was verbal at baseline, would communicate in short simple sentances to indicate wants and needs.  He was mod I with WC mobility in the home and needed one person assist for transfers and assistance with ADLs.         VITALS  Vital Signs Last 24 Hrs  T(C): 36.4 (09 Aug 2018 07:40), Max: 36.5 (08 Aug 2018 20:32)  T(F): 97.5 (09 Aug 2018 07:40), Max: 97.7 (08 Aug 2018 20:32)  HR: 80 (09 Aug 2018 07:40) (80 - 80)  BP: 106/71 (09 Aug 2018 07:40) (104/69 - 106/71)  BP(mean): --  RR: 14 (09 Aug 2018 07:40) (14 - 14)  SpO2: 98% (09 Aug 2018 07:40) (98% - 98%)      PHYSICAL EXAM  Mental Status - Patient is sleepy but arousable, micmicks commands.   Neuro exam limited by impaired command following.   	Cranial Nerves -right eye paralysis, non-reactive.  Left eye movement appears WNL, no facial asymmetry, + tongue mobility  	Motor Exam - No noticeable focal deficits, moves all extremities equally  	Sensory: unable to fully assess LT, withdraws from painful stimuli  	Coord: unable to fully assess--impaired    	HEENT:  normocephalic, right eye closed--eyeball immobile		  	LUNGS:	Clear bilaterally 	  	HEART:	 Normal S1S2   No murmur RRR        	GI/ ABDOMEN:  Soft, erythema noted around peg tube insertion site  	EXTREMITIES:   bilat hand edema-improving and bruising   SKIN: Partial thickness wounds to bilateral elbows-dressed.  Scattered healing scratches to arms and torso    FUNCTIONAL PROGRESS  Gait - Wheelchair mobility 65 ft with supervision  ADLs: max A groom/bathing, mod A UBD, transfers  Functional transfer - Mod A.     RECENT LABS                  RADIOLOGY/OTHER RESULTS      MEDICATIONS  (STANDING):  artificial  tears Solution 1 Drop(s) Right EYE every 3 hours  ascorbic acid 500 milliGRAM(s) Oral two times a day  chlorhexidine 0.12% Liquid 15 milliLiter(s) Swish and Spit two times a day  clonazePAM Tablet 0.5 milliGRAM(s) Oral two times a day  clonazePAM Tablet 2 milliGRAM(s) Oral at bedtime  doxazosin 4 milliGRAM(s) Oral at bedtime  doxycycline hyclate Capsule 100 milliGRAM(s) Oral every 12 hours  enoxaparin Injectable 40 milliGRAM(s) SubCutaneous daily  gabapentin   Solution 300 milliGRAM(s) Oral three times a day  levothyroxine 50 MICROGram(s) Oral daily  multivitamin   Solution 15 milliLiter(s) Oral daily  nystatin Ointment 1 Application(s) Topical two times a day  pantoprazole    Tablet 40 milliGRAM(s) Oral daily  QUEtiapine 25 milliGRAM(s) Oral at bedtime  valproic  acid Syrup 500 milliGRAM(s) Oral every 8 hours  zinc oxide 20% Ointment 1 Application(s) Topical two times a day    MEDICATIONS  (PRN):  QUEtiapine 25 milliGRAM(s) Oral at bedtime PRN give if agitation or insomnia continues 1 hour after scheduled dose

## 2018-08-10 LAB
AMMONIA BLD-MCNC: 70 UMOL/L — HIGH (ref 11–55)
ANION GAP SERPL CALC-SCNC: 3 MMOL/L — LOW (ref 5–17)
BUN SERPL-MCNC: 20 MG/DL — SIGNIFICANT CHANGE UP (ref 7–23)
CALCIUM SERPL-MCNC: 8.8 MG/DL — SIGNIFICANT CHANGE UP (ref 8.4–10.5)
CHLORIDE SERPL-SCNC: 106 MMOL/L — SIGNIFICANT CHANGE UP (ref 96–108)
CO2 SERPL-SCNC: 32 MMOL/L — HIGH (ref 22–31)
CREAT SERPL-MCNC: 0.6 MG/DL — SIGNIFICANT CHANGE UP (ref 0.5–1.3)
GLUCOSE SERPL-MCNC: 85 MG/DL — SIGNIFICANT CHANGE UP (ref 70–99)
HCT VFR BLD CALC: 34.1 % — LOW (ref 39–50)
HGB BLD-MCNC: 11 G/DL — LOW (ref 13–17)
MCHC RBC-ENTMCNC: 31.1 PG — SIGNIFICANT CHANGE UP (ref 27–34)
MCHC RBC-ENTMCNC: 32.2 GM/DL — SIGNIFICANT CHANGE UP (ref 32–36)
MCV RBC AUTO: 96.6 FL — SIGNIFICANT CHANGE UP (ref 80–100)
PLATELET # BLD AUTO: 162 K/UL — SIGNIFICANT CHANGE UP (ref 150–400)
POTASSIUM SERPL-MCNC: 4.3 MMOL/L — SIGNIFICANT CHANGE UP (ref 3.5–5.3)
POTASSIUM SERPL-SCNC: 4.3 MMOL/L — SIGNIFICANT CHANGE UP (ref 3.5–5.3)
RBC # BLD: 3.53 M/UL — LOW (ref 4.2–5.8)
RBC # FLD: 15 % — HIGH (ref 10.3–14.5)
SODIUM SERPL-SCNC: 141 MMOL/L — SIGNIFICANT CHANGE UP (ref 135–145)
WBC # BLD: 3.8 K/UL — SIGNIFICANT CHANGE UP (ref 3.8–10.5)
WBC # FLD AUTO: 3.8 K/UL — SIGNIFICANT CHANGE UP (ref 3.8–10.5)

## 2018-08-10 PROCEDURE — 99233 SBSQ HOSP IP/OBS HIGH 50: CPT

## 2018-08-10 RX ORDER — BACITRACIN ZINC 500 UNIT/G
1 OINTMENT IN PACKET (EA) TOPICAL DAILY
Qty: 0 | Refills: 0 | Status: DISCONTINUED | OUTPATIENT
Start: 2018-08-10 | End: 2018-08-15

## 2018-08-10 RX ADMIN — GABAPENTIN 300 MILLIGRAM(S): 400 CAPSULE ORAL at 15:36

## 2018-08-10 RX ADMIN — Medication 500 MILLIGRAM(S): at 21:18

## 2018-08-10 RX ADMIN — NYSTATIN CREAM 1 APPLICATION(S): 100000 CREAM TOPICAL at 17:40

## 2018-08-10 RX ADMIN — Medication 500 MILLIGRAM(S): at 06:21

## 2018-08-10 RX ADMIN — GABAPENTIN 300 MILLIGRAM(S): 400 CAPSULE ORAL at 06:23

## 2018-08-10 RX ADMIN — Medication 1 DROP(S): at 00:41

## 2018-08-10 RX ADMIN — PANTOPRAZOLE SODIUM 40 MILLIGRAM(S): 20 TABLET, DELAYED RELEASE ORAL at 11:45

## 2018-08-10 RX ADMIN — NYSTATIN CREAM 1 APPLICATION(S): 100000 CREAM TOPICAL at 06:22

## 2018-08-10 RX ADMIN — Medication 500 MILLIGRAM(S): at 17:38

## 2018-08-10 RX ADMIN — Medication 100 MILLIGRAM(S): at 06:21

## 2018-08-10 RX ADMIN — Medication 1 DROP(S): at 17:38

## 2018-08-10 RX ADMIN — Medication 1 DROP(S): at 21:17

## 2018-08-10 RX ADMIN — Medication 0.5 MILLIGRAM(S): at 06:21

## 2018-08-10 RX ADMIN — QUETIAPINE FUMARATE 25 MILLIGRAM(S): 200 TABLET, FILM COATED ORAL at 21:17

## 2018-08-10 RX ADMIN — Medication 500 MILLIGRAM(S): at 06:22

## 2018-08-10 RX ADMIN — ENOXAPARIN SODIUM 40 MILLIGRAM(S): 100 INJECTION SUBCUTANEOUS at 11:15

## 2018-08-10 RX ADMIN — CHLORHEXIDINE GLUCONATE 15 MILLILITER(S): 213 SOLUTION TOPICAL at 06:21

## 2018-08-10 RX ADMIN — Medication 1 APPLICATION(S): at 14:15

## 2018-08-10 RX ADMIN — ZINC OXIDE 1 APPLICATION(S): 200 OINTMENT TOPICAL at 06:22

## 2018-08-10 RX ADMIN — Medication 1 DROP(S): at 15:36

## 2018-08-10 RX ADMIN — Medication 0.5 MILLIGRAM(S): at 17:38

## 2018-08-10 RX ADMIN — GABAPENTIN 300 MILLIGRAM(S): 400 CAPSULE ORAL at 21:17

## 2018-08-10 RX ADMIN — QUETIAPINE FUMARATE 25 MILLIGRAM(S): 200 TABLET, FILM COATED ORAL at 01:56

## 2018-08-10 RX ADMIN — Medication 1 DROP(S): at 08:07

## 2018-08-10 RX ADMIN — Medication 50 MICROGRAM(S): at 06:22

## 2018-08-10 RX ADMIN — ZINC OXIDE 1 APPLICATION(S): 200 OINTMENT TOPICAL at 17:41

## 2018-08-10 RX ADMIN — Medication 100 MILLIGRAM(S): at 17:38

## 2018-08-10 RX ADMIN — CHLORHEXIDINE GLUCONATE 15 MILLILITER(S): 213 SOLUTION TOPICAL at 17:38

## 2018-08-10 RX ADMIN — Medication 1 DROP(S): at 11:14

## 2018-08-10 RX ADMIN — Medication 4 MILLIGRAM(S): at 21:17

## 2018-08-10 RX ADMIN — Medication 1 DROP(S): at 06:20

## 2018-08-10 RX ADMIN — Medication 500 MILLIGRAM(S): at 14:15

## 2018-08-10 RX ADMIN — Medication 15 MILLILITER(S): at 11:46

## 2018-08-10 RX ADMIN — Medication 2 MILLIGRAM(S): at 21:17

## 2018-08-10 NOTE — PROGRESS NOTE ADULT - ASSESSMENT
52M with MR from group home with hx of seizure found to have left SDH and left parafrontal epidural hematoma after a fall, now at acute rehab    #Left SDH   #Left epidural hematoma  #Traumatic Brain Injury  -PT/OT/speech therapy    #Seizure disorder  -Depakote    #Mental Retardation  -Supportive care    #Pressure ulcer / stage 2 decubiti bilateral elbows  -Topical routine wound care to sites of skin tears, improving    #High ammonia level  -This is of undetermined significance. Must find out patient's baseline mental state - patient seemed more awake today than when I saw him on a prior exam. (Today, the ammonia level is higher, and the patient appears more awake). However, baseline mental state is unclear. If patient at his baseline mental state, the ammonia level becomes less significant. Would stop checking this level unless patient becomes lethargic / AMS, etc.    DVT ppx: Lovenox 52M with MR from group home with hx of seizure found to have left SDH and left parafrontal epidural hematoma after a fall, now at acute rehab    #Left SDH   #Left epidural hematoma  #Traumatic Brain Injury  -PT/OT/speech therapy    #Seizure disorder  -Depakote    #Mental Retardation  -Supportive care    #High ammonia level  -This is of undetermined significance. Must find out patient's baseline mental state - patient seemed more awake today than when I saw him on a prior exam. (Today, the ammonia level is higher, and the patient appears more awake). However, baseline mental state is unclear. If patient at his baseline mental state, the ammonia level becomes less significant. Would stop checking this level unless patient becomes lethargic / AMS, etc.    DVT ppx: Lovenox

## 2018-08-10 NOTE — PROVIDER CONTACT NOTE (CHANGE IN STATUS NOTIFICATION) - ACTION/TREATMENT ORDERED:
Bladder scan done >1060 ml result. Straight catheterization done as per protocol and obtained 1100ml of yellow color urine.  Continue to monitor.

## 2018-08-10 NOTE — PROGRESS NOTE ADULT - ASSESSMENT
This is a 51 y/o male with history of seizures and MR with new left parafrontal epidural and a minimal subdural hematoma/TBI now with functional deficits after sustaining fall.       Continue comprehensive PT, OT and ST program.       IDT 8/9/18--Pt. with PT/OT function as above.  d/c planning 8/15.  Spoke with Viky Rivera regarding training on Tuesday. SW to f/u.     elevated Ammonia level--d/w hospitalist,  Depakote level normal (depakote can cause hyperammonia).  Pt. at his baseline mentation.  If becoming lethargic, will investigate further.     Urinary retention--voided this AM with WCQ=374 when taking to the bathrooom.  This AM bladder scan was overnight in bed.  Will order toileting program q.2h while awake.

## 2018-08-10 NOTE — CHART NOTE - NSCHARTNOTEFT_GEN_A_CORE
Nutrition follow up     Hospital course: Pt 51 y/o M with PMH: seizures, mental retardation, glaucoma, blind right eye, thrombocytopenia, admitted S/P TBI, cellulitis, elbow ulcer stage II.     Attempted to interview - pt sleeping, confused and with mental retardation as per chart. No EN provision documented in flow sheets, per RN pt receiving feedings at goal and tolerating well, denies pt with GI distress at this time, reports last BM 2 days ago (08/08).     Source: Patient [ ]    Family [ ]     other [x]; medical record; RN     Diet : NPO with tube feedings     Enteral /Parenteral Nutrition: TwoCal HN through bolus feeds via PEG @240ml/hr every 6 hours provides 960 ml, 1920 kcal, and 80 g protein + Derik 1 pckg per day provides 80 kcal and 14 g protein; total provides 2000 kcal and 94 g protein (33kcal/kg and 1.6 g/kg protein based on current weight 59.9 kg).    Current Weight: (08/06) 132 pounds -> (08/08) 138.4 pounds -> (08/10) 138.8 pounds - weight likely to be stable at this time.   % Weight Change: n/a     Pertinent Medications: MEDICATIONS  (STANDING):  artificial  tears Solution 1 Drop(s) Right EYE every 3 hours  ascorbic acid 500 milliGRAM(s) Oral two times a day  chlorhexidine 0.12% Liquid 15 milliLiter(s) Swish and Spit two times a day  clonazePAM Tablet 0.5 milliGRAM(s) Oral two times a day  clonazePAM Tablet 2 milliGRAM(s) Oral at bedtime  doxazosin 4 milliGRAM(s) Oral at bedtime  doxycycline hyclate Capsule 100 milliGRAM(s) Oral every 12 hours  enoxaparin Injectable 40 milliGRAM(s) SubCutaneous daily  gabapentin   Solution 300 milliGRAM(s) Oral three times a day  levothyroxine 50 MICROGram(s) Oral daily  multivitamin   Solution 15 milliLiter(s) Oral daily  nystatin Ointment 1 Application(s) Topical two times a day  pantoprazole   Suspension 40 milliGRAM(s) Oral daily  QUEtiapine 25 milliGRAM(s) Oral at bedtime  valproic  acid Syrup 500 milliGRAM(s) Oral every 8 hours  zinc oxide 20% Ointment 1 Application(s) Topical two times a day    MEDICATIONS  (PRN):  QUEtiapine 25 milliGRAM(s) Oral at bedtime PRN give if agitation or insomnia continues 1 hour after scheduled dose    Pertinent Labs: (08/10) Ammonia 70  (07/27) [id number] 4.5%    Skin: wound in left and right wrists; pressure ulcer in right and left elbow stage II as per documentation.  No noted edema as per flow sheets.     Estimated Needs:   [x] no change since previous assessment  [ ] recalculated:     Previous Nutrition Diagnosis:     [x] Increased nutrient needs (specify); protein, MVI, vitamin C    Nutrition Diagnosis is [x] ongoing - being addressed with tube feedings as above.      New Nutrition Diagnosis: [x] not applicable    Interventions:     1. Recommend continue EN provision as above. Encouraged RN to provide Derik via tube feedings. Defer diet advancement to team.   2. Continue MVI and vitamin C to help with healing.  3. Continue to obtain weights to identify changes if any.     Monitoring and Evaluation:     [ ] PO intake [x] Tolerance to diet prescription [x] weights [x] follow up per protocol    [x] other: EN provision and tolerance.     RD remains available.   Camilla Arias Dietitian

## 2018-08-10 NOTE — PROGRESS NOTE ADULT - SUBJECTIVE AND OBJECTIVE BOX
Patient is a 52y old  Male who presents with a chief complaint of TBI (06 Aug 2018 15:03)    Patient seen and examined at bedside. Does not partake in history.    ALLERGIES:  sulfiSOXAZOLE ophthalmic (Unknown)    MEDICATIONS  (STANDING):  artificial  tears Solution 1 Drop(s) Right EYE every 3 hours  ascorbic acid 500 milliGRAM(s) Oral two times a day  BACItracin   Ointment 1 Application(s) Topical daily  chlorhexidine 0.12% Liquid 15 milliLiter(s) Swish and Spit two times a day  clonazePAM Tablet 0.5 milliGRAM(s) Oral two times a day  clonazePAM Tablet 2 milliGRAM(s) Oral at bedtime  doxazosin 4 milliGRAM(s) Oral at bedtime  doxycycline hyclate Capsule 100 milliGRAM(s) Oral every 12 hours  enoxaparin Injectable 40 milliGRAM(s) SubCutaneous daily  gabapentin   Solution 300 milliGRAM(s) Oral three times a day  levothyroxine 50 MICROGram(s) Oral daily  multivitamin   Solution 15 milliLiter(s) Oral daily  nystatin Ointment 1 Application(s) Topical two times a day  pantoprazole   Suspension 40 milliGRAM(s) Oral daily  QUEtiapine 25 milliGRAM(s) Oral at bedtime  valproic  acid Syrup 500 milliGRAM(s) Oral every 8 hours  zinc oxide 20% Ointment 1 Application(s) Topical two times a day    MEDICATIONS  (PRN):  QUEtiapine 25 milliGRAM(s) Oral at bedtime PRN give if agitation or insomnia continues 1 hour after scheduled dose    Vital Signs Last 24 Hrs  T(F): 97.4 (10 Aug 2018 07:39), Max: 97.9 (09 Aug 2018 20:03)  HR: 77 (10 Aug 2018 07:39) (77 - 80)  BP: 119/76 (10 Aug 2018 07:39) (104/56 - 119/76)  RR: 14 (10 Aug 2018 07:39) (14 - 14)  SpO2: 98% (10 Aug 2018 07:39) (96% - 98%)  I&O's Summary    09 Aug 2018 07:01  -  10 Aug 2018 07:00  --------------------------------------------------------  IN: 880 mL / OUT: 1100 mL / NET: -220 mL    10 Aug 2018 07:01  -  10 Aug 2018 13:24  --------------------------------------------------------  IN: 490 mL / OUT: 0 mL / NET: 490 mL      PHYSICAL EXAM:  GENERAL: Makes a "humming" sound during the exam  EYES: Right eye is surgically absent  NECK: No JVD  CHEST/LUNG: Clear to auscultation bilaterally limited to poor effort  HEART: Regular rate and rhythm; S1/S2  ABDOMEN: Soft, Nontender, Nondistended; Bowel sounds present; PEG+  VASCULAR: Normal pulses, Normal capillary refill  EXTREMITIES: No edema, left ankle is contracted  SKIN: superficial skin tears bilateral forearm, bilateral elbow skin tears - IMPROVED    LABS:                        11.0   3.8   )-----------( 162      ( 10 Aug 2018 05:41 )             34.1     08-10    141  |  106  |  20  ----------------------------<  85  4.3   |  32  |  0.60    Ca    8.8      10 Aug 2018 05:41      eGFR if Non African American: 114 mL/min/1.73M2 (08-10-18 @ 05:41)  eGFR if : 132 mL/min/1.73M2 (08-10-18 @ 05:41)    07-27 [id number] 4.5        RADIOLOGY & ADDITIONAL TESTS:  < from: 12 Lead ECG (08.07.18 @ 08:49) >  Diagnosis Line Normal sinus rhythm  Low voltage in limb leads.  Counterclockwise rotation  Left anterior fascicular block  Abnormal ECG  No previous ECGs available    < end of copied text >    Care Discussed with Consultants/Other Providers: Dr. Simmons Patient is a 52y old  Male who presents with a chief complaint of TBI (06 Aug 2018 15:03)    Patient seen and examined at bedside. Does not partake in history.    ALLERGIES:  sulfiSOXAZOLE ophthalmic (Unknown)    MEDICATIONS  (STANDING):  artificial  tears Solution 1 Drop(s) Right EYE every 3 hours  ascorbic acid 500 milliGRAM(s) Oral two times a day  BACItracin   Ointment 1 Application(s) Topical daily  chlorhexidine 0.12% Liquid 15 milliLiter(s) Swish and Spit two times a day  clonazePAM Tablet 0.5 milliGRAM(s) Oral two times a day  clonazePAM Tablet 2 milliGRAM(s) Oral at bedtime  doxazosin 4 milliGRAM(s) Oral at bedtime  doxycycline hyclate Capsule 100 milliGRAM(s) Oral every 12 hours  enoxaparin Injectable 40 milliGRAM(s) SubCutaneous daily  gabapentin   Solution 300 milliGRAM(s) Oral three times a day  levothyroxine 50 MICROGram(s) Oral daily  multivitamin   Solution 15 milliLiter(s) Oral daily  nystatin Ointment 1 Application(s) Topical two times a day  pantoprazole   Suspension 40 milliGRAM(s) Oral daily  QUEtiapine 25 milliGRAM(s) Oral at bedtime  valproic  acid Syrup 500 milliGRAM(s) Oral every 8 hours  zinc oxide 20% Ointment 1 Application(s) Topical two times a day    MEDICATIONS  (PRN):  QUEtiapine 25 milliGRAM(s) Oral at bedtime PRN give if agitation or insomnia continues 1 hour after scheduled dose    Vital Signs Last 24 Hrs  T(F): 97.4 (10 Aug 2018 07:39), Max: 97.9 (09 Aug 2018 20:03)  HR: 77 (10 Aug 2018 07:39) (77 - 80)  BP: 119/76 (10 Aug 2018 07:39) (104/56 - 119/76)  RR: 14 (10 Aug 2018 07:39) (14 - 14)  SpO2: 98% (10 Aug 2018 07:39) (96% - 98%)  I&O's Summary    09 Aug 2018 07:01  -  10 Aug 2018 07:00  --------------------------------------------------------  IN: 880 mL / OUT: 1100 mL / NET: -220 mL    10 Aug 2018 07:01  -  10 Aug 2018 13:24  --------------------------------------------------------  IN: 490 mL / OUT: 0 mL / NET: 490 mL      PHYSICAL EXAM:  GENERAL: Makes a "humming" sound during the exam  EYES: Right eye is surgically absent  NECK: No JVD  CHEST/LUNG: Clear to auscultation bilaterally limited to poor effort  HEART: Regular rate and rhythm; S1/S2  ABDOMEN: Soft, Nontender, Nondistended; Bowel sounds present; PEG+  VASCULAR: Normal pulses, Normal capillary refill  EXTREMITIES: No edema, left ankle is contracted    LABS:                        11.0   3.8   )-----------( 162      ( 10 Aug 2018 05:41 )             34.1     08-10    141  |  106  |  20  ----------------------------<  85  4.3   |  32  |  0.60    Ca    8.8      10 Aug 2018 05:41      eGFR if Non African American: 114 mL/min/1.73M2 (08-10-18 @ 05:41)  eGFR if : 132 mL/min/1.73M2 (08-10-18 @ 05:41)    07-27 [id number] 4.5        RADIOLOGY & ADDITIONAL TESTS:  < from: 12 Lead ECG (08.07.18 @ 08:49) >  Diagnosis Line Normal sinus rhythm  Low voltage in limb leads.  Counterclockwise rotation  Left anterior fascicular block  Abnormal ECG  No previous ECGs available    < end of copied text >    Care Discussed with Consultants/Other Providers: Dr. Simmons

## 2018-08-10 NOTE — PROVIDER CONTACT NOTE (CHANGE IN STATUS NOTIFICATION) - ASSESSMENT
Pt incontinent of elimination, no void since 11pm as per PCA. Pt. on bolus feeding 2 Tyree HN 240ml every 6hrs. Restless all night. on 1:1 observation for impulsivity and safety.

## 2018-08-10 NOTE — PROGRESS NOTE ADULT - PROBLEM SELECTOR PLAN 3
with behavioral problems and agitation. Continue  depakote. Continue home dose of klonopin.  Cont. 1:1 supervision.  Pt. stable.   Seroquel 25mg qHS, and repeat 25mg if agitation or insomnia persists.  Cont. 1:1 supervision and hand mitts for safety.

## 2018-08-10 NOTE — PROGRESS NOTE ADULT - SUBJECTIVE AND OBJECTIVE BOX
HPI:  This is a 51 y/o male with history of seizures, MR, and glaucoma found down at his group home. Patient went to bed at 9:30pm and patient was found down at 6:30am. He is nonverbal and has severe agitation at baseline. Patient was taken to [facility] where he was found to have a Left parafrontal epidural and a minimal subdural hematoma with no midline shift. C-spine shows no fractures or subluxations. Patient was intubated for altered mental status and transferred to [facility].   At [facility], pt was admitted to SICU. Neurosurgery was consulted and conservative management undertaken for subdural hemorrhage. Repeat CT imaging was stable. Patient was subsequently weaned from sedation and extubated on 7/27. PT/OT/Rehab services evaluated patient with recommendation for Acute Inpatient Rehab once medically stable. Patient became more lucid once transferred to floor. Central line and arterial line were discontinued. While on floor patient noted to have cellulitis around PEG site which abx were started for on 8/2/18. Total course of 10 days suggested. Patient now stable for discharge with follow up in clinics as outpatient.    Of note patient also noted to be hypernatremic. Started on free water via PEG and showed gradual improvement.       PAST MEDICAL & SURGICAL HISTORY:  Foot deformity, left  Thrombocytopenia  Blind right eye  MR (mental retardation)  Glaucoma  Seizures  S/P hernia repair      Subjective:  Pt. was straight cath this AM for 1100 cc of urine after did not void all night.  But this AM when taken to bathroom, voided a large amount.  Pt. denies pain.  1:1 at bedside states overnight report --pt. did not sleep much.  Sleep log not completed last night.  no fever/chills  Pt. cooperative.     REVIEW OF SYMPTOMS  MR--cognitive deficits, pt. was verbal at baseline, would communicate in short simple sentances to indicate wants and needs.  He was mod I with WC mobility in the home and needed one person assist for transfers and assistance with ADLs. Aggressive behaviors at times--will bite and scratch himself  Urinary retention last night.       VITALS  Vital Signs Last 24 Hrs  T(C): 36.3 (10 Aug 2018 07:39), Max: 36.6 (09 Aug 2018 20:03)  T(F): 97.4 (10 Aug 2018 07:39), Max: 97.9 (09 Aug 2018 20:03)  HR: 77 (10 Aug 2018 07:39) (77 - 80)  BP: 119/76 (10 Aug 2018 07:39) (104/56 - 119/76)  BP(mean): --  RR: 14 (10 Aug 2018 07:39) (14 - 14)  SpO2: 98% (10 Aug 2018 07:39) (96% - 98%)      PHYSICAL EXAM  Mental Status - Patient is sleepy but arousable, micmicks commands.   Neuro exam limited by impaired command following.   	Cranial Nerves -right eye paralysis, non-reactive.  Left eye movement appears WNL, no facial asymmetry, + tongue mobility  	Motor Exam - No noticeable focal deficits, moves all extremities equally  	Sensory: unable to fully assess LT, withdraws from painful stimuli  	Coord: unable to fully assess--impaired    	HEENT:  normocephalic, right eye closed--eyeball immobile		  	LUNGS:	Clear bilaterally 	  	HEART:	 Normal S1S2   No murmur RRR        	GI/ ABDOMEN:  Soft, erythema noted around peg tube insertion site improved   	EXTREMITIES:   bilat hand edema-improving and bruising   SKIN: bilateral elbow wounds healed, erythema resolved.   Scattered healing scratches to arms and torso,  healing bite marks on wrists.     FUNCTIONAL PROGRESS  Gait - Wheelchair mobility 65 ft with supervision  ADLs: max A groom/bathing, mod A UBD, transfers  Functional transfer - Mod A.     RECENT LABS                        11.0   3.8   )-----------( 162      ( 10 Aug 2018 05:41 )             34.1     08-10    141  |  106  |  20  ----------------------------<  85  4.3   |  32<H>  |  0.60    Ca    8.8      10 Aug 2018 05:41        RADIOLOGY/OTHER RESULTS      MEDICATIONS  (STANDING):  artificial  tears Solution 1 Drop(s) Right EYE every 3 hours  ascorbic acid 500 milliGRAM(s) Oral two times a day  chlorhexidine 0.12% Liquid 15 milliLiter(s) Swish and Spit two times a day  clonazePAM Tablet 0.5 milliGRAM(s) Oral two times a day  clonazePAM Tablet 2 milliGRAM(s) Oral at bedtime  doxazosin 4 milliGRAM(s) Oral at bedtime  doxycycline hyclate Capsule 100 milliGRAM(s) Oral every 12 hours  enoxaparin Injectable 40 milliGRAM(s) SubCutaneous daily  gabapentin   Solution 300 milliGRAM(s) Oral three times a day  levothyroxine 50 MICROGram(s) Oral daily  multivitamin   Solution 15 milliLiter(s) Oral daily  nystatin Ointment 1 Application(s) Topical two times a day  pantoprazole   Suspension 40 milliGRAM(s) Oral daily  QUEtiapine 25 milliGRAM(s) Oral at bedtime  valproic  acid Syrup 500 milliGRAM(s) Oral every 8 hours  zinc oxide 20% Ointment 1 Application(s) Topical two times a day    MEDICATIONS  (PRN):  QUEtiapine 25 milliGRAM(s) Oral at bedtime PRN give if agitation or insomnia continues 1 hour after scheduled dose HPI:  This is a 51 y/o male with history of seizures, MR, and glaucoma found down at his group home. Patient went to bed at 9:30pm and patient was found down at 6:30am. He is nonverbal and has severe agitation at baseline. Patient was taken to [facility] where he was found to have a Left parafrontal epidural and a minimal subdural hematoma with no midline shift. C-spine shows no fractures or subluxations. Patient was intubated for altered mental status and transferred to [facility].   At [facility], pt was admitted to SICU. Neurosurgery was consulted and conservative management undertaken for subdural hemorrhage. Repeat CT imaging was stable. Patient was subsequently weaned from sedation and extubated on 7/27. PT/OT/Rehab services evaluated patient with recommendation for Acute Inpatient Rehab once medically stable. Patient became more lucid once transferred to floor. Central line and arterial line were discontinued. While on floor patient noted to have cellulitis around PEG site which abx were started for on 8/2/18. Total course of 10 days suggested. Patient now stable for discharge with follow up in clinics as outpatient.    Of note patient also noted to be hypernatremic. Started on free water via PEG and showed gradual improvement.       PAST MEDICAL & SURGICAL HISTORY:  Foot deformity, left  Thrombocytopenia  Blind right eye  MR (mental retardation)  Glaucoma  Seizures  S/P hernia repair      Subjective:  Pt. was straight cath this AM for 1100 cc of urine after did not void all night.  But this AM when taken to bathroom, voided a large amount.  Pt. denies pain.  1:1 at bedside states overnight report --pt. did not sleep much.  Sleep log not completed last night.  no fever/chills  Pt. cooperative.     REVIEW OF SYMPTOMS  MR--cognitive deficits, pt. was verbal at baseline, would communicate in short simple sentances to indicate wants and needs.  He was mod I with WC mobility in the home and needed one person assist for transfers and assistance with ADLs. Aggressive behaviors at times--will bite and scratch himself  Urinary retention last night. Normally incontinent. last BM 8/8      VITALS  Vital Signs Last 24 Hrs  T(C): 36.3 (10 Aug 2018 07:39), Max: 36.6 (09 Aug 2018 20:03)  T(F): 97.4 (10 Aug 2018 07:39), Max: 97.9 (09 Aug 2018 20:03)  HR: 77 (10 Aug 2018 07:39) (77 - 80)  BP: 119/76 (10 Aug 2018 07:39) (104/56 - 119/76)  BP(mean): --  RR: 14 (10 Aug 2018 07:39) (14 - 14)  SpO2: 98% (10 Aug 2018 07:39) (96% - 98%)      PHYSICAL EXAM  Mental Status - Patient is sleepy but arousable, micmicks commands.   Neuro exam limited by impaired command following.   	Cranial Nerves -right eye paralysis, non-reactive.  Left eye movement appears WNL, no facial asymmetry, + tongue mobility  	Motor Exam - No noticeable focal deficits, moves all extremities equally  	Sensory: unable to fully assess LT, withdraws from painful stimuli  	Coord: unable to fully assess--impaired    	HEENT:  normocephalic, right eye closed--eyeball immobile		  	LUNGS:	Clear bilaterally 	  	HEART:	 Normal S1S2   No murmur RRR        	GI/ ABDOMEN:  Soft, erythema noted around peg tube insertion site improved   	EXTREMITIES:   bilat hand edema-improving and bruising   SKIN: bilateral elbow wounds healed, erythema resolved.   Scattered healing scratches to arms and torso,  healing bite marks on wrists.     FUNCTIONAL PROGRESS  Gait - Wheelchair mobility 65 ft with supervision  ADLs: max A groom/bathing, mod A UBD, transfers  Functional transfer - Mod A.     RECENT LABS                        11.0   3.8   )-----------( 162      ( 10 Aug 2018 05:41 )             34.1     08-10    141  |  106  |  20  ----------------------------<  85  4.3   |  32<H>  |  0.60    Ca    8.8      10 Aug 2018 05:41        RADIOLOGY/OTHER RESULTS      MEDICATIONS  (STANDING):  artificial  tears Solution 1 Drop(s) Right EYE every 3 hours  ascorbic acid 500 milliGRAM(s) Oral two times a day  chlorhexidine 0.12% Liquid 15 milliLiter(s) Swish and Spit two times a day  clonazePAM Tablet 0.5 milliGRAM(s) Oral two times a day  clonazePAM Tablet 2 milliGRAM(s) Oral at bedtime  doxazosin 4 milliGRAM(s) Oral at bedtime  doxycycline hyclate Capsule 100 milliGRAM(s) Oral every 12 hours  enoxaparin Injectable 40 milliGRAM(s) SubCutaneous daily  gabapentin   Solution 300 milliGRAM(s) Oral three times a day  levothyroxine 50 MICROGram(s) Oral daily  multivitamin   Solution 15 milliLiter(s) Oral daily  nystatin Ointment 1 Application(s) Topical two times a day  pantoprazole   Suspension 40 milliGRAM(s) Oral daily  QUEtiapine 25 milliGRAM(s) Oral at bedtime  valproic  acid Syrup 500 milliGRAM(s) Oral every 8 hours  zinc oxide 20% Ointment 1 Application(s) Topical two times a day    MEDICATIONS  (PRN):  QUEtiapine 25 milliGRAM(s) Oral at bedtime PRN give if agitation or insomnia continues 1 hour after scheduled dose

## 2018-08-11 PROCEDURE — 99232 SBSQ HOSP IP/OBS MODERATE 35: CPT

## 2018-08-11 RX ADMIN — GABAPENTIN 300 MILLIGRAM(S): 400 CAPSULE ORAL at 21:15

## 2018-08-11 RX ADMIN — ZINC OXIDE 1 APPLICATION(S): 200 OINTMENT TOPICAL at 22:47

## 2018-08-11 RX ADMIN — Medication 0.5 MILLIGRAM(S): at 06:31

## 2018-08-11 RX ADMIN — NYSTATIN CREAM 1 APPLICATION(S): 100000 CREAM TOPICAL at 06:33

## 2018-08-11 RX ADMIN — Medication 100 MILLIGRAM(S): at 17:49

## 2018-08-11 RX ADMIN — Medication 1 DROP(S): at 21:17

## 2018-08-11 RX ADMIN — NYSTATIN CREAM 1 APPLICATION(S): 100000 CREAM TOPICAL at 21:17

## 2018-08-11 RX ADMIN — GABAPENTIN 300 MILLIGRAM(S): 400 CAPSULE ORAL at 06:31

## 2018-08-11 RX ADMIN — Medication 1 DROP(S): at 06:28

## 2018-08-11 RX ADMIN — Medication 2 MILLIGRAM(S): at 21:15

## 2018-08-11 RX ADMIN — Medication 500 MILLIGRAM(S): at 17:49

## 2018-08-11 RX ADMIN — Medication 0.5 MILLIGRAM(S): at 17:49

## 2018-08-11 RX ADMIN — CHLORHEXIDINE GLUCONATE 15 MILLILITER(S): 213 SOLUTION TOPICAL at 06:28

## 2018-08-11 RX ADMIN — GABAPENTIN 300 MILLIGRAM(S): 400 CAPSULE ORAL at 17:48

## 2018-08-11 RX ADMIN — CHLORHEXIDINE GLUCONATE 15 MILLILITER(S): 213 SOLUTION TOPICAL at 16:55

## 2018-08-11 RX ADMIN — Medication 500 MILLIGRAM(S): at 06:28

## 2018-08-11 RX ADMIN — Medication 1 DROP(S): at 03:43

## 2018-08-11 RX ADMIN — Medication 50 MICROGRAM(S): at 06:31

## 2018-08-11 RX ADMIN — Medication 1 DROP(S): at 11:55

## 2018-08-11 RX ADMIN — Medication 1 APPLICATION(S): at 11:57

## 2018-08-11 RX ADMIN — Medication 1 DROP(S): at 17:49

## 2018-08-11 RX ADMIN — ZINC OXIDE 1 APPLICATION(S): 200 OINTMENT TOPICAL at 06:34

## 2018-08-11 RX ADMIN — QUETIAPINE FUMARATE 25 MILLIGRAM(S): 200 TABLET, FILM COATED ORAL at 21:15

## 2018-08-11 RX ADMIN — Medication 1 DROP(S): at 00:21

## 2018-08-11 RX ADMIN — Medication 100 MILLIGRAM(S): at 06:28

## 2018-08-11 RX ADMIN — Medication 15 MILLILITER(S): at 11:58

## 2018-08-11 RX ADMIN — ENOXAPARIN SODIUM 40 MILLIGRAM(S): 100 INJECTION SUBCUTANEOUS at 11:58

## 2018-08-11 RX ADMIN — Medication 500 MILLIGRAM(S): at 21:15

## 2018-08-11 RX ADMIN — PANTOPRAZOLE SODIUM 40 MILLIGRAM(S): 20 TABLET, DELAYED RELEASE ORAL at 11:57

## 2018-08-11 RX ADMIN — Medication 4 MILLIGRAM(S): at 21:15

## 2018-08-11 RX ADMIN — Medication 500 MILLIGRAM(S): at 11:59

## 2018-08-11 NOTE — PROGRESS NOTE ADULT - SUBJECTIVE AND OBJECTIVE BOX
HPI:  This is a 53 y/o male with history of seizures, MR, and glaucoma found down at his group home. Patient went to bed at 9:30pm and patient was found down at 6:30am. He is nonverbal and has severe agitation at baseline. Patient was taken to [facility] where he was found to have a Left parafrontal epidural and a minimal subdural hematoma with no midline shift. C-spine shows no fractures or subluxations. Patient was intubated for altered mental status and transferred to [facility].   At [facility], pt was admitted to SICU. Neurosurgery was consulted and conservative management undertaken for subdural hemorrhage. Repeat CT imaging was stable. Patient was subsequently weaned from sedation and extubated on 7/27. PT/OT/Rehab services evaluated patient with recommendation for Acute Inpatient Rehab once medically stable. Patient became more lucid once transferred to floor. Central line and arterial line were discontinued. While on floor patient noted to have cellulitis around PEG site which abx were started for on 8/2/18. Total course of 10 days suggested. Patient now stable for discharge with follow up in clinics as outpatient.    Of note patient also noted to be hypernatremic. Started on free water via PEG and showed gradual improvement.   PAST MEDICAL & SURGICAL HISTORY:  Foot deformity, left  Thrombocytopenia  Blind right eye  MR (mental retardation)  Glaucoma  Seizures  S/P hernia repair      Subjective:  Patient seen and evaluated at the bedside. No events overnight. He is on a 1:1. He is asleep but arousable.    REVIEW OF SYMPTOMS  MR--cognitive deficits, pt. was verbal at baseline, would communicate in short simple sentances to indicate wants and needs.  He was mod I with WC mobility in the home and needed one person assist for transfers and assistance with ADLs. Aggressive behaviors at times--will bite and scratch himself  Urinary retention last night. Normally incontinent. last BM 8/8      VITALS  Vital Signs Last 24 Hrs  T(C): 36.3 (10 Aug 2018 07:39), Max: 36.6 (09 Aug 2018 20:03)  T(F): 97.4 (10 Aug 2018 07:39), Max: 97.9 (09 Aug 2018 20:03)  HR: 77 (10 Aug 2018 07:39) (77 - 80)  BP: 119/76 (10 Aug 2018 07:39) (104/56 - 119/76)  BP(mean): --  RR: 14 (10 Aug 2018 07:39) (14 - 14)  SpO2: 98% (10 Aug 2018 07:39) (96% - 98%)      PHYSICAL EXAM  Mental Status - Patient is sleepy but arousable, micmicks commands.   Neuro exam limited by impaired command following.   	Cranial Nerves -right eye paralysis, non-reactive.  Left eye movement appears WNL, no facial asymmetry, + tongue mobility  	Motor Exam - No noticeable focal deficits, moves all extremities equally  	Sensory: unable to fully assess LT, withdraws from painful stimuli  	Coord: unable to fully assess--impaired    	HEENT:  normocephalic, right eye closed--eyeball immobile		  	LUNGS:	Clear bilaterally 	  	HEART:	 Normal S1S2   No murmur RRR        	GI/ ABDOMEN:  Soft, erythema noted around peg tube insertion site improved   	EXTREMITIES:   bilat hand edema-improving and bruising   SKIN: bilateral elbow wounds healed, erythema resolved.   Scattered healing scratches to arms and torso,  healing bite marks on wrists.     FUNCTIONAL PROGRESS  Gait - Wheelchair mobility 65 ft with supervision  ADLs: max A groom/bathing, mod A UBD, transfers  Functional transfer - Mod A.     RECENT LABS                        11.0   3.8   )-----------( 162      ( 10 Aug 2018 05:41 )             34.1     08-10    141  |  106  |  20  ----------------------------<  85  4.3   |  32<H>  |  0.60    Ca    8.8      10 Aug 2018 05:41        RADIOLOGY/OTHER RESULTS      MEDICATIONS  (STANDING):  artificial  tears Solution 1 Drop(s) Right EYE every 3 hours  ascorbic acid 500 milliGRAM(s) Oral two times a day  chlorhexidine 0.12% Liquid 15 milliLiter(s) Swish and Spit two times a day  clonazePAM Tablet 0.5 milliGRAM(s) Oral two times a day  clonazePAM Tablet 2 milliGRAM(s) Oral at bedtime  doxazosin 4 milliGRAM(s) Oral at bedtime  doxycycline hyclate Capsule 100 milliGRAM(s) Oral every 12 hours  enoxaparin Injectable 40 milliGRAM(s) SubCutaneous daily  gabapentin   Solution 300 milliGRAM(s) Oral three times a day  levothyroxine 50 MICROGram(s) Oral daily  multivitamin   Solution 15 milliLiter(s) Oral daily  nystatin Ointment 1 Application(s) Topical two times a day  pantoprazole   Suspension 40 milliGRAM(s) Oral daily  QUEtiapine 25 milliGRAM(s) Oral at bedtime  valproic  acid Syrup 500 milliGRAM(s) Oral every 8 hours  zinc oxide 20% Ointment 1 Application(s) Topical two times a day    MEDICATIONS  (PRN):  QUEtiapine 25 milliGRAM(s) Oral at bedtime PRN give if agitation or insomnia continues 1 hour after scheduled dose

## 2018-08-11 NOTE — PROGRESS NOTE ADULT - ASSESSMENT
This is a 51 y/o male with history of seizures and MR with new left parafrontal epidural and a minimal subdural hematoma/TBI now with functional deficits after sustaining fall.       Continue comprehensive PT, OT and ST program.   IDT 8/9/18--Pt. with PT/OT function as above.  d/c planning 8/15.  Spoke with Viky Rivera regarding training on Tuesday. SW to f/u.   Pt. at his baseline mentation.  If becoming lethargic, will investigate further.   Urinary retention--voided this AM with [id number] when taking to the bathrooom.  This AM bladder scan was overnight in bed.  Will order toileting program q.2h while awake.

## 2018-08-12 PROCEDURE — 99232 SBSQ HOSP IP/OBS MODERATE 35: CPT

## 2018-08-12 RX ADMIN — Medication 500 MILLIGRAM(S): at 06:01

## 2018-08-12 RX ADMIN — Medication 500 MILLIGRAM(S): at 17:38

## 2018-08-12 RX ADMIN — CHLORHEXIDINE GLUCONATE 15 MILLILITER(S): 213 SOLUTION TOPICAL at 06:00

## 2018-08-12 RX ADMIN — Medication 1 DROP(S): at 16:35

## 2018-08-12 RX ADMIN — QUETIAPINE FUMARATE 25 MILLIGRAM(S): 200 TABLET, FILM COATED ORAL at 20:48

## 2018-08-12 RX ADMIN — ZINC OXIDE 1 APPLICATION(S): 200 OINTMENT TOPICAL at 06:19

## 2018-08-12 RX ADMIN — Medication 500 MILLIGRAM(S): at 06:00

## 2018-08-12 RX ADMIN — Medication 100 MILLIGRAM(S): at 17:38

## 2018-08-12 RX ADMIN — GABAPENTIN 300 MILLIGRAM(S): 400 CAPSULE ORAL at 17:38

## 2018-08-12 RX ADMIN — Medication 1 DROP(S): at 20:47

## 2018-08-12 RX ADMIN — Medication 50 MICROGRAM(S): at 06:01

## 2018-08-12 RX ADMIN — Medication 4 MILLIGRAM(S): at 20:59

## 2018-08-12 RX ADMIN — Medication 1 DROP(S): at 03:04

## 2018-08-12 RX ADMIN — Medication 1 DROP(S): at 11:30

## 2018-08-12 RX ADMIN — Medication 500 MILLIGRAM(S): at 20:47

## 2018-08-12 RX ADMIN — Medication 1 DROP(S): at 23:45

## 2018-08-12 RX ADMIN — GABAPENTIN 300 MILLIGRAM(S): 400 CAPSULE ORAL at 06:01

## 2018-08-12 RX ADMIN — NYSTATIN CREAM 1 APPLICATION(S): 100000 CREAM TOPICAL at 06:19

## 2018-08-12 RX ADMIN — Medication 15 MILLILITER(S): at 11:31

## 2018-08-12 RX ADMIN — Medication 0.5 MILLIGRAM(S): at 17:38

## 2018-08-12 RX ADMIN — Medication 500 MILLIGRAM(S): at 17:40

## 2018-08-12 RX ADMIN — GABAPENTIN 300 MILLIGRAM(S): 400 CAPSULE ORAL at 20:47

## 2018-08-12 RX ADMIN — ENOXAPARIN SODIUM 40 MILLIGRAM(S): 100 INJECTION SUBCUTANEOUS at 11:30

## 2018-08-12 RX ADMIN — PANTOPRAZOLE SODIUM 40 MILLIGRAM(S): 20 TABLET, DELAYED RELEASE ORAL at 11:31

## 2018-08-12 RX ADMIN — Medication 1 DROP(S): at 06:00

## 2018-08-12 RX ADMIN — NYSTATIN CREAM 1 APPLICATION(S): 100000 CREAM TOPICAL at 16:23

## 2018-08-12 RX ADMIN — ZINC OXIDE 1 APPLICATION(S): 200 OINTMENT TOPICAL at 17:40

## 2018-08-12 RX ADMIN — Medication 1 APPLICATION(S): at 11:30

## 2018-08-12 RX ADMIN — CHLORHEXIDINE GLUCONATE 15 MILLILITER(S): 213 SOLUTION TOPICAL at 16:34

## 2018-08-12 RX ADMIN — Medication 0.5 MILLIGRAM(S): at 06:01

## 2018-08-12 RX ADMIN — Medication 2 MILLIGRAM(S): at 20:48

## 2018-08-12 RX ADMIN — Medication 100 MILLIGRAM(S): at 06:01

## 2018-08-12 NOTE — PROGRESS NOTE ADULT - SUBJECTIVE AND OBJECTIVE BOX
HPI:  This is a 53 y/o male with history of seizures, MR, and glaucoma found down at his group home. Patient went to bed at 9:30pm and patient was found down at 6:30am. He is nonverbal and has severe agitation at baseline. Patient was taken to [facility] where he was found to have a Left parafrontal epidural and a minimal subdural hematoma with no midline shift. C-spine shows no fractures or subluxations. Patient was intubated for altered mental status and transferred to [facility].   At [facility], pt was admitted to SICU. Neurosurgery was consulted and conservative management undertaken for subdural hemorrhage. Repeat CT imaging was stable. Patient was subsequently weaned from sedation and extubated on 7/27. PT/OT/Rehab services evaluated patient with recommendation for Acute Inpatient Rehab once medically stable. Patient became more lucid once transferred to floor. Central line and arterial line were discontinued. While on floor patient noted to have cellulitis around PEG site which abx were started for on 8/2/18. Total course of 10 days suggested. Patient now stable for discharge with follow up in clinics as outpatient.    Of note patient also noted to be hypernatremic. Started on free water via PEG and showed gradual improvement.   PAST MEDICAL & SURGICAL HISTORY:  Foot deformity, left  Thrombocytopenia  Blind right eye  MR (mental retardation)  Glaucoma  Seizures  S/P hernia repair      Subjective:  Patient seen and evaluated at the bedside. No events overnight. He is on a 1:1. Was asleep but easily arousable.    REVIEW OF SYMPTOMS  MR--cognitive deficits, pt. was verbal at baseline, would communicate in short simple sentances to indicate wants and needs.  He was mod I with WC mobility in the home and needed one person assist for transfers and assistance with ADLs. Aggressive behaviors at times--will bite and scratch himself  Urinary retention last night. Normally incontinent. last BM 8/8      VITALS  Vital Signs Last 24 Hrs  T(C): 36.3 (10 Aug 2018 07:39), Max: 36.6 (09 Aug 2018 20:03)  T(F): 97.4 (10 Aug 2018 07:39), Max: 97.9 (09 Aug 2018 20:03)  HR: 77 (10 Aug 2018 07:39) (77 - 80)  BP: 119/76 (10 Aug 2018 07:39) (104/56 - 119/76)  BP(mean): --  RR: 14 (10 Aug 2018 07:39) (14 - 14)  SpO2: 98% (10 Aug 2018 07:39) (96% - 98%)      PHYSICAL EXAM  Mental Status - Patient is sleepy but arousable, micmicks commands.   Neuro exam limited by impaired command following.   	Cranial Nerves -right eye paralysis, non-reactive.  Left eye movement appears WNL, no facial asymmetry, + tongue mobility  	Motor Exam - No noticeable focal deficits, moves all extremities equally  	Sensory: unable to fully assess LT, withdraws from painful stimuli  	Coord: unable to fully assess--impaired    	HEENT:  normocephalic, right eye closed--eyeball immobile		  	LUNGS:	Clear bilaterally 	  	HEART:	 Normal S1S2   No murmur RRR        	GI/ ABDOMEN:  Soft, erythema noted around peg tube insertion site improved   	EXTREMITIES:   bilat hand edema-improving and bruising   SKIN: bilateral elbow wounds healed, erythema resolved.   Scattered healing scratches to arms and torso,  healing bite marks on wrists.     FUNCTIONAL PROGRESS  Gait - Wheelchair mobility 65 ft with supervision  ADLs: max A groom/bathing, mod A UBD, transfers  Functional transfer - Mod A.     RECENT LABS                        11.0   3.8   )-----------( 162      ( 10 Aug 2018 05:41 )             34.1     08-10    141  |  106  |  20  ----------------------------<  85  4.3   |  32<H>  |  0.60    Ca    8.8      10 Aug 2018 05:41    MEDICATIONS  (STANDING):  artificial  tears Solution 1 Drop(s) Right EYE every 3 hours  ascorbic acid 500 milliGRAM(s) Oral two times a day  chlorhexidine 0.12% Liquid 15 milliLiter(s) Swish and Spit two times a day  clonazePAM Tablet 0.5 milliGRAM(s) Oral two times a day  clonazePAM Tablet 2 milliGRAM(s) Oral at bedtime  doxazosin 4 milliGRAM(s) Oral at bedtime  doxycycline hyclate Capsule 100 milliGRAM(s) Oral every 12 hours  enoxaparin Injectable 40 milliGRAM(s) SubCutaneous daily  gabapentin   Solution 300 milliGRAM(s) Oral three times a day  levothyroxine 50 MICROGram(s) Oral daily  multivitamin   Solution 15 milliLiter(s) Oral daily  nystatin Ointment 1 Application(s) Topical two times a day  pantoprazole   Suspension 40 milliGRAM(s) Oral daily  QUEtiapine 25 milliGRAM(s) Oral at bedtime  valproic  acid Syrup 500 milliGRAM(s) Oral every 8 hours  zinc oxide 20% Ointment 1 Application(s) Topical two times a day    MEDICATIONS  (PRN):  QUEtiapine 25 milliGRAM(s) Oral at bedtime PRN give if agitation or insomnia continues 1 hour after scheduled dose

## 2018-08-12 NOTE — PROGRESS NOTE ADULT - ASSESSMENT
This is a 53 y/o male with history of seizures and MR with new left parafrontal epidural and a minimal subdural hematoma/TBI now with functional deficits after sustaining fall.       Continue comprehensive PT, OT and ST program.   Continue medical management as per primary team

## 2018-08-13 PROCEDURE — 99232 SBSQ HOSP IP/OBS MODERATE 35: CPT

## 2018-08-13 PROCEDURE — 99233 SBSQ HOSP IP/OBS HIGH 50: CPT

## 2018-08-13 RX ORDER — CLONAZEPAM 1 MG
2 TABLET ORAL AT BEDTIME
Qty: 0 | Refills: 0 | Status: DISCONTINUED | OUTPATIENT
Start: 2018-08-13 | End: 2018-08-15

## 2018-08-13 RX ORDER — CLONAZEPAM 1 MG
0.5 TABLET ORAL
Qty: 0 | Refills: 0 | Status: DISCONTINUED | OUTPATIENT
Start: 2018-08-13 | End: 2018-08-15

## 2018-08-13 RX ADMIN — Medication 0.5 MILLIGRAM(S): at 17:35

## 2018-08-13 RX ADMIN — Medication 15 MILLILITER(S): at 12:36

## 2018-08-13 RX ADMIN — Medication 1 DROP(S): at 05:03

## 2018-08-13 RX ADMIN — Medication 500 MILLIGRAM(S): at 05:02

## 2018-08-13 RX ADMIN — NYSTATIN CREAM 1 APPLICATION(S): 100000 CREAM TOPICAL at 05:02

## 2018-08-13 RX ADMIN — CHLORHEXIDINE GLUCONATE 15 MILLILITER(S): 213 SOLUTION TOPICAL at 17:24

## 2018-08-13 RX ADMIN — CHLORHEXIDINE GLUCONATE 15 MILLILITER(S): 213 SOLUTION TOPICAL at 05:03

## 2018-08-13 RX ADMIN — QUETIAPINE FUMARATE 25 MILLIGRAM(S): 200 TABLET, FILM COATED ORAL at 22:24

## 2018-08-13 RX ADMIN — Medication 2 MILLIGRAM(S): at 22:23

## 2018-08-13 RX ADMIN — Medication 50 MICROGRAM(S): at 05:02

## 2018-08-13 RX ADMIN — Medication 1 APPLICATION(S): at 12:38

## 2018-08-13 RX ADMIN — Medication 1 DROP(S): at 17:24

## 2018-08-13 RX ADMIN — Medication 500 MILLIGRAM(S): at 17:24

## 2018-08-13 RX ADMIN — Medication 0.5 MILLIGRAM(S): at 05:02

## 2018-08-13 RX ADMIN — NYSTATIN CREAM 1 APPLICATION(S): 100000 CREAM TOPICAL at 17:35

## 2018-08-13 RX ADMIN — ZINC OXIDE 1 APPLICATION(S): 200 OINTMENT TOPICAL at 05:03

## 2018-08-13 RX ADMIN — GABAPENTIN 300 MILLIGRAM(S): 400 CAPSULE ORAL at 22:23

## 2018-08-13 RX ADMIN — Medication 500 MILLIGRAM(S): at 12:37

## 2018-08-13 RX ADMIN — GABAPENTIN 300 MILLIGRAM(S): 400 CAPSULE ORAL at 05:02

## 2018-08-13 RX ADMIN — PANTOPRAZOLE SODIUM 40 MILLIGRAM(S): 20 TABLET, DELAYED RELEASE ORAL at 12:36

## 2018-08-13 RX ADMIN — GABAPENTIN 300 MILLIGRAM(S): 400 CAPSULE ORAL at 14:09

## 2018-08-13 RX ADMIN — Medication 500 MILLIGRAM(S): at 22:24

## 2018-08-13 RX ADMIN — Medication 1 DROP(S): at 20:41

## 2018-08-13 RX ADMIN — ZINC OXIDE 1 APPLICATION(S): 200 OINTMENT TOPICAL at 17:36

## 2018-08-13 RX ADMIN — Medication 100 MILLIGRAM(S): at 05:02

## 2018-08-13 RX ADMIN — Medication 1 DROP(S): at 12:37

## 2018-08-13 RX ADMIN — Medication 4 MILLIGRAM(S): at 22:23

## 2018-08-13 RX ADMIN — ENOXAPARIN SODIUM 40 MILLIGRAM(S): 100 INJECTION SUBCUTANEOUS at 12:36

## 2018-08-13 NOTE — PROGRESS NOTE ADULT - ASSESSMENT
This is a 51 y/o male with history of seizures and MR with new left parafrontal epidural and a minimal subdural hematoma/TBI now with functional deficits after sustaining fall.

## 2018-08-13 NOTE — PROGRESS NOTE ADULT - ASSESSMENT
This is a 52 y o M with history of seizures and MR adm to rehab with new left parafrontal epidural and a minimal subdural hematoma/TBI now with functional deficits after sustaining fall.

## 2018-08-13 NOTE — PROGRESS NOTE ADULT - SUBJECTIVE AND OBJECTIVE BOX
CC: Patient is a 52y old  Male who presents with a chief complaint of TBI (06 Aug 2018 15:03)      S: No f/c/n/v/pain    Patient seen and examined at bedside.    ALLERGIES:  sulfiSOXAZOLE ophthalmic (Unknown)      MEDICATIONS:  artificial  tears Solution 1 Drop(s) Right EYE every 3 hours  ascorbic acid 500 milliGRAM(s) Oral two times a day  BACItracin   Ointment 1 Application(s) Topical daily  chlorhexidine 0.12% Liquid 15 milliLiter(s) Swish and Spit two times a day  clonazePAM Tablet 0.5 milliGRAM(s) Oral two times a day  clonazePAM Tablet 2 milliGRAM(s) Oral at bedtime  doxazosin 4 milliGRAM(s) Oral at bedtime  doxycycline hyclate Capsule 100 milliGRAM(s) Oral every 12 hours  enoxaparin Injectable 40 milliGRAM(s) SubCutaneous daily  gabapentin   Solution 300 milliGRAM(s) Oral three times a day  levothyroxine 50 MICROGram(s) Oral daily  multivitamin   Solution 15 milliLiter(s) Oral daily  nystatin Ointment 1 Application(s) Topical two times a day  pantoprazole   Suspension 40 milliGRAM(s) Oral daily  QUEtiapine 25 milliGRAM(s) Oral at bedtime  QUEtiapine 25 milliGRAM(s) Oral at bedtime PRN  valproic  acid Syrup 500 milliGRAM(s) Oral every 8 hours  zinc oxide 20% Ointment 1 Application(s) Topical two times a day        Vital Signs Last 24 Hrs  T(F): 98 (13 Aug 2018 07:16), Max: 98 (13 Aug 2018 07:16)  HR: 78 (13 Aug 2018 07:16) (78 - 78)  BP: 93/58 (13 Aug 2018 07:16) (93/58 - 102/65)  RR: 14 (13 Aug 2018 07:16) (14 - 14)  SpO2: 94% (13 Aug 2018 07:16) (94% - 96%)  I&O's Summary    12 Aug 2018 07:01  -  13 Aug 2018 07:00  --------------------------------------------------------  IN: 1960 mL / OUT: 1830 mL / NET: 130 mL        PHYSICAL EXAM:  General: sleeping, answers briefly to questions  ENT: MMM  Neck: Supple, No JVD  Lungs: Clear to auscultation bilaterally  Cardio: RRR, S1/S2, No murmurs  Abdomen: Soft, Nontender, Nondistended; Bowel sounds present  Extremities: No cyanosis, No edema  Neuro: no new deficits, moves all deifcits  Skin: no rashes  Psych: unable to assess, pt sleeping, has 1:1 observation, calm    LABS:    reviewed            CAPILLARY BLOOD GLUCOSE        07-27 [id number] 4.5          RADIOLOGY & ADDITIONAL TESTS:    Care Discussed with Consultants/Other Providers:   Rehab team

## 2018-08-13 NOTE — PROGRESS NOTE ADULT - PROBLEM SELECTOR PLAN 2
Continue Depakote 500mg q8hrs. Monitor for seizure activity.   Depakote level therapeutic Continue Depakote 500mg q8hrs. Monitor for seizure activity.  Depakote level therapeutic

## 2018-08-13 NOTE — PROGRESS NOTE ADULT - PROBLEM SELECTOR PLAN 3
with behavioral problems and agitation. Continue  depakote. Continue home dose of klonopin.  Cont. 1:1 supervision.  Pt. stable.   Seroquel 25mg qHS, and repeat 25mg if agitation or insomnia persists.  Cont. 1:1 supervision and hand mitts for safety with behavioral problems and agitation. Continue depakote. Continue home dose of klonopin.  Cont. 1:1 supervision.  Pt. stable.   Seroquel 25mg qHS, and repeat 25mg if agitation or insomnia persists.  Cont. 1:1 supervision and hand mitts for safety

## 2018-08-13 NOTE — PROGRESS NOTE ADULT - SUBJECTIVE AND OBJECTIVE BOX
HPI:  This is a 53 y/o male with history of seizures, MR, and glaucoma found down at his group home. Patient went to bed at 9:30pm and patient was found down at 6:30am. He is nonverbal and has severe agitation at baseline. Patient was taken to [facility] where he was found to have a Left parafrontal epidural and a minimal subdural hematoma with no midline shift. C-spine shows no fractures or subluxations. Patient was intubated for altered mental status and transferred to [facility].   At [facility], pt was admitted to SICU. Neurosurgery was consulted and conservative management undertaken for subdural hemorrhage. Repeat CT imaging was stable. Patient was subsequently weaned from sedation and extubated on 7/27. PT/OT/Rehab services evaluated patient with recommendation for Acute Inpatient Rehab once medically stable. Patient became more lucid once transferred to floor. Central line and arterial line were discontinued. While on floor patient noted to have cellulitis around PEG site which abx were started for on 8/2/18. Total course of 10 days suggested. Patient now stable for discharge with follow up in clinics as outpatient.    Of note patient also noted to be hypernatremic. Started on free water via PEG and showed gradual improvement.   PAST MEDICAL & SURGICAL HISTORY:  Foot deformity, left  Thrombocytopenia  Blind right eye  MR (mental retardation)  Glaucoma  Seizures  S/P hernia repair      Subjective:  Patient seen and evaluated self propelling (feet) wheelchair in hallway. Continues to be on 1:1 with no reported events overnight. Denies headache or pain. Minimal verbal output with slowed processing at baseline.  Offers no new complaints.      REVIEW OF SYMPTOMS  MR--cognitive deficits, pt. was verbal at baseline, would communicate in short simple sentences to indicate wants and needs.  He was mod I with WC mobility in the home and needed one person assist for transfers and assistance with ADLs. Aggressive behaviors at times--will bite and scratch himself  Per flowsheet - slept at least on hour last night but no sleep log maintained.  LBM yesterday 8/12     Vital Signs Last 24 Hrs  T(C): 36.7 (13 Aug 2018 07:16), Max: 36.7 (13 Aug 2018 07:16)  T(F): 98 (13 Aug 2018 07:16), Max: 98 (13 Aug 2018 07:16)  HR: 78 (13 Aug 2018 07:16) (78 - 78)  BP: 93/58 (13 Aug 2018 07:16) (93/58 - 102/65)  BP(mean): --  RR: 14 (13 Aug 2018 07:16) (14 - 14)  SpO2: 94% (13 Aug 2018 07:16) (94% - 96%)    PHYSICAL EXAM  Mental Status - Patient is somewhat somnolent but arouses to voice  Neuro exam limited by impaired command following.   	Cranial Nerves -right eye paralysis, non-reactive.  Left eye movement appears WNL, no facial asymmetry, + tongue mobility  	Motor Exam - No noticeable focal deficits, moves all extremities equally  	Sensory: unable to fully assess LT, withdraws from painful stimuli  	Coord: unable to fully assess--impaired    	HEENT:  normocephalic, right eye closed--eyeball immobile		  	LUNGS:	Clear bilaterally 	  	HEART:	 Normal S1S2   No murmur RRR        	GI/ ABDOMEN:  Soft, erythema noted around peg tube insertion site improved   	EXTREMITIES:   bilat hand edema-improving and bruising   SKIN: bilateral elbow wounds healed, erythema resolved.   Scattered healing scratches to arms and torso,  healing bite marks on wrists.     FUNCTIONAL PROGRESS  Gait - Wheelchair mobility 65 ft with supervision  ADLs: max A groom/bathing, mod A UBD, transfers  Functional transfer - Mod A.     RECENT LABS  no new labs today 8/13    MEDICATIONS  (STANDING):  artificial  tears Solution 1 Drop(s) Right EYE every 3 hours  ascorbic acid 500 milliGRAM(s) Oral two times a day  BACItracin   Ointment 1 Application(s) Topical daily  chlorhexidine 0.12% Liquid 15 milliLiter(s) Swish and Spit two times a day  clonazePAM Tablet 0.5 milliGRAM(s) Oral two times a day  clonazePAM Tablet 2 milliGRAM(s) Oral at bedtime  doxazosin 4 milliGRAM(s) Oral at bedtime  enoxaparin Injectable 40 milliGRAM(s) SubCutaneous daily  gabapentin   Solution 300 milliGRAM(s) Oral three times a day  levothyroxine 50 MICROGram(s) Oral daily  multivitamin   Solution 15 milliLiter(s) Oral daily  nystatin Ointment 1 Application(s) Topical two times a day  pantoprazole   Suspension 40 milliGRAM(s) Oral daily  QUEtiapine 25 milliGRAM(s) Oral at bedtime  valproic  acid Syrup 500 milliGRAM(s) Oral every 8 hours  zinc oxide 20% Ointment 1 Application(s) Topical two times a day    MEDICATIONS  (PRN):  QUEtiapine 25 milliGRAM(s) Oral at bedtime PRN give if agitation or insomnia continues 1 hour after scheduled dose

## 2018-08-13 NOTE — PROGRESS NOTE ADULT - PROBLEM SELECTOR PLAN 4
Pt on 1:1 observation, has behavioral problems and agitation. Continue Depakote, Klonopin, Seroquel.

## 2018-08-13 NOTE — PROGRESS NOTE ADULT - PROBLEM SELECTOR PLAN 4
improved s/p 10-day course of doxycycline resolved s/p 10-day course of doxycycline. Stopped antibiotic today

## 2018-08-14 ENCOUNTER — TRANSCRIPTION ENCOUNTER (OUTPATIENT)
Age: 53
End: 2018-08-14

## 2018-08-14 PROCEDURE — 99233 SBSQ HOSP IP/OBS HIGH 50: CPT

## 2018-08-14 PROCEDURE — 99232 SBSQ HOSP IP/OBS MODERATE 35: CPT

## 2018-08-14 RX ORDER — QUETIAPINE FUMARATE 200 MG/1
1 TABLET, FILM COATED ORAL
Qty: 30 | Refills: 0 | OUTPATIENT
Start: 2018-08-14 | End: 2018-09-12

## 2018-08-14 RX ORDER — BACITRACIN ZINC 500 UNIT/G
1 OINTMENT IN PACKET (EA) TOPICAL
Qty: 0 | Refills: 0 | COMMUNITY
Start: 2018-08-14

## 2018-08-14 RX ORDER — ZINC OXIDE 200 MG/G
1 OINTMENT TOPICAL
Qty: 0 | Refills: 0 | COMMUNITY
Start: 2018-08-14

## 2018-08-14 RX ADMIN — ZINC OXIDE 1 APPLICATION(S): 200 OINTMENT TOPICAL at 06:42

## 2018-08-14 RX ADMIN — Medication 4 MILLIGRAM(S): at 21:55

## 2018-08-14 RX ADMIN — ENOXAPARIN SODIUM 40 MILLIGRAM(S): 100 INJECTION SUBCUTANEOUS at 12:13

## 2018-08-14 RX ADMIN — Medication 1 DROP(S): at 12:13

## 2018-08-14 RX ADMIN — CHLORHEXIDINE GLUCONATE 15 MILLILITER(S): 213 SOLUTION TOPICAL at 06:41

## 2018-08-14 RX ADMIN — GABAPENTIN 300 MILLIGRAM(S): 400 CAPSULE ORAL at 06:41

## 2018-08-14 RX ADMIN — Medication 1 DROP(S): at 21:53

## 2018-08-14 RX ADMIN — CHLORHEXIDINE GLUCONATE 15 MILLILITER(S): 213 SOLUTION TOPICAL at 17:32

## 2018-08-14 RX ADMIN — Medication 1 APPLICATION(S): at 12:12

## 2018-08-14 RX ADMIN — Medication 500 MILLIGRAM(S): at 21:54

## 2018-08-14 RX ADMIN — Medication 1 DROP(S): at 06:40

## 2018-08-14 RX ADMIN — GABAPENTIN 300 MILLIGRAM(S): 400 CAPSULE ORAL at 14:36

## 2018-08-14 RX ADMIN — Medication 1 DROP(S): at 08:49

## 2018-08-14 RX ADMIN — NYSTATIN CREAM 1 APPLICATION(S): 100000 CREAM TOPICAL at 17:33

## 2018-08-14 RX ADMIN — Medication 0.5 MILLIGRAM(S): at 06:40

## 2018-08-14 RX ADMIN — Medication 2 MILLIGRAM(S): at 21:53

## 2018-08-14 RX ADMIN — Medication 15 MILLILITER(S): at 12:14

## 2018-08-14 RX ADMIN — NYSTATIN CREAM 1 APPLICATION(S): 100000 CREAM TOPICAL at 06:42

## 2018-08-14 RX ADMIN — QUETIAPINE FUMARATE 25 MILLIGRAM(S): 200 TABLET, FILM COATED ORAL at 21:54

## 2018-08-14 RX ADMIN — Medication 0.5 MILLIGRAM(S): at 17:32

## 2018-08-14 RX ADMIN — ZINC OXIDE 1 APPLICATION(S): 200 OINTMENT TOPICAL at 17:33

## 2018-08-14 RX ADMIN — Medication 500 MILLIGRAM(S): at 17:32

## 2018-08-14 RX ADMIN — PANTOPRAZOLE SODIUM 40 MILLIGRAM(S): 20 TABLET, DELAYED RELEASE ORAL at 12:14

## 2018-08-14 RX ADMIN — Medication 500 MILLIGRAM(S): at 06:41

## 2018-08-14 RX ADMIN — Medication 50 MICROGRAM(S): at 06:40

## 2018-08-14 RX ADMIN — Medication 500 MILLIGRAM(S): at 06:42

## 2018-08-14 RX ADMIN — Medication 1 DROP(S): at 17:32

## 2018-08-14 RX ADMIN — Medication 1 DROP(S): at 14:38

## 2018-08-14 RX ADMIN — GABAPENTIN 300 MILLIGRAM(S): 400 CAPSULE ORAL at 21:55

## 2018-08-14 RX ADMIN — Medication 500 MILLIGRAM(S): at 14:37

## 2018-08-14 NOTE — PROGRESS NOTE ADULT - PROBLEM SELECTOR PLAN 4
Pt on 1:1 observation, has behavioral problems and agitation, currently is calm. Continue Depakote, Klonopin, Seroquel.

## 2018-08-14 NOTE — DISCHARGE NOTE ADULT - PLAN OF CARE
Mod to Max Assist for ADLs, transfers; WC mobility with supervision Continue therapy as outpatient/group home Continue depakote, gabapentin.  Follow up with PCP Dr. Cordero within 2 weeks Continue medications and treatment as prescribed.  Follow up with PCP within 2 weeks. Currently NPO with meds and feeds via PEG. Will need ongoing swallowing evaluation/ aspiration precautions.  Follow up with PCP within 2 weeks.

## 2018-08-14 NOTE — DISCHARGE NOTE ADULT - CARE PROVIDERS DIRECT ADDRESSES
,DirectAddress_Unknown,[email or web address]

## 2018-08-14 NOTE — DISCHARGE NOTE ADULT - MEDICATION SUMMARY - MEDICATIONS TO STOP TAKING
I will STOP taking the medications listed below when I get home from the hospital:    tiotropium 18 mcg inhalation capsule  -- 1 cap(s) inhaled once a day

## 2018-08-14 NOTE — PROGRESS NOTE ADULT - SUBJECTIVE AND OBJECTIVE BOX
HPI:  This is a 53 y/o male with history of seizures, MR, and glaucoma found down at his group home. Patient went to bed at 9:30pm and patient was found down at 6:30am. He is nonverbal and has severe agitation at baseline. Patient was taken to [facility] where he was found to have a Left parafrontal epidural and a minimal subdural hematoma with no midline shift. C-spine shows no fractures or subluxations. Patient was intubated for altered mental status and transferred to [facility].   At [facility], pt was admitted to SICU. Neurosurgery was consulted and conservative management undertaken for subdural hemorrhage. Repeat CT imaging was stable. Patient was subsequently weaned from sedation and extubated on 7/27. PT/OT/Rehab services evaluated patient with recommendation for Acute Inpatient Rehab once medically stable. Patient became more lucid once transferred to floor. Central line and arterial line were discontinued. While on floor patient noted to have cellulitis around PEG site which abx were started for on 8/2/18. Total course of 10 days suggested. Patient now stable for discharge with follow up in clinics as outpatient.    Of note patient also noted to be hypernatremic. Started on free water via PEG and showed gradual improvement.   PAST MEDICAL & SURGICAL HISTORY:  Foot deformity, left  Thrombocytopenia  Blind right eye  MR (mental retardation)  Glaucoma  Seizures  S/P hernia repair      Subjective:  Patient seen and evaluated.  More alert and improved initiation, communication this AM.  Patient requesting to eat ice cream.  Denies headache or pain. Continues to be on 1:1 with no reported events overnight.  Able to make needs known.      REVIEW OF SYMPTOMS  MR--cognitive deficits, pt. was verbal at baseline, would communicate in short simple sentences to indicate wants and needs.  He was mod I with WC mobility in the home and needed one person assist for transfers and assistance with ADLs. Aggressive behaviors at times--will bite and scratch himself    Vital Signs Last 24 Hrs  T(C): 36.3 (14 Aug 2018 07:40), Max: 36.6 (13 Aug 2018 22:30)  T(F): 97.4 (14 Aug 2018 07:40), Max: 97.8 (13 Aug 2018 22:30)  HR: 84 (14 Aug 2018 07:40) (74 - 84)  BP: 96/64 (14 Aug 2018 07:40) (96/64 - 100/60)  BP(mean): --  RR: 14 (14 Aug 2018 07:40) (14 - 14)  SpO2: 94% (14 Aug 2018 07:40) (94% - 94%)    PHYSICAL EXAM  Mental Status - Patient improved alertness, communication this AM   Neuro exam limited by impaired command following.   	Cranial Nerves -right eye paralysis, non-reactive.  Left eye movement appears WNL, no facial asymmetry, + tongue mobility  	Motor Exam - No noticeable focal deficits, moves all extremities equally  	Sensory: unable to fully assess LT, withdraws from painful stimuli  	Coord: unable to fully assess--impaired    	HEENT:  normocephalic, right eye closed--eyeball immobile		  	LUNGS:	Clear bilaterally 	  	HEART:	 Normal S1S2   No murmur RRR        	GI/ ABDOMEN:  Soft, erythema noted around peg tube insertion site improved   	EXTREMITIES:   bilat hand edema-improving and bruising   SKIN: bilateral elbow wounds healed, erythema resolved.   Scattered healing scratches to arms and torso,  healing bite marks on wrists.     FUNCTIONAL PROGRESS  Gait - Wheelchair mobility 65 ft with supervision  ADLs: max A groom/bathing, mod A UBD, transfers  Functional transfer - Mod A.     RECENT LABS  no new labs today 8/14    MEDICATIONS  (STANDING):  artificial  tears Solution 1 Drop(s) Right EYE every 3 hours  ascorbic acid 500 milliGRAM(s) Oral two times a day  BACItracin   Ointment 1 Application(s) Topical daily  chlorhexidine 0.12% Liquid 15 milliLiter(s) Swish and Spit two times a day  clonazePAM Tablet 0.5 milliGRAM(s) Oral two times a day  clonazePAM Tablet 2 milliGRAM(s) Oral at bedtime  doxazosin 4 milliGRAM(s) Oral at bedtime  enoxaparin Injectable 40 milliGRAM(s) SubCutaneous daily  gabapentin   Solution 300 milliGRAM(s) Oral three times a day  levothyroxine 50 MICROGram(s) Oral daily  multivitamin   Solution 15 milliLiter(s) Oral daily  nystatin Ointment 1 Application(s) Topical two times a day  pantoprazole   Suspension 40 milliGRAM(s) Oral daily  QUEtiapine 25 milliGRAM(s) Oral at bedtime  valproic  acid Syrup 500 milliGRAM(s) Oral every 8 hours  zinc oxide 20% Ointment 1 Application(s) Topical two times a day    MEDICATIONS  (PRN):  QUEtiapine 25 milliGRAM(s) Oral at bedtime PRN give if agitation or insomnia continues 1 hour after scheduled dose

## 2018-08-14 NOTE — PROGRESS NOTE ADULT - SUBJECTIVE AND OBJECTIVE BOX
CC: Patient is a 52y old  Male who presents with a chief complaint of TBI (06 Aug 2018 15:03)      S: No complaints, pt says he feels ok, 1:1 aide at bedisde    Patient seen and examined at bedside.    ALLERGIES:  sulfiSOXAZOLE ophthalmic (Unknown)      MEDICATIONS:  artificial  tears Solution 1 Drop(s) Right EYE every 3 hours  ascorbic acid 500 milliGRAM(s) Oral two times a day  BACItracin   Ointment 1 Application(s) Topical daily  chlorhexidine 0.12% Liquid 15 milliLiter(s) Swish and Spit two times a day  clonazePAM Tablet 0.5 milliGRAM(s) Oral two times a day  clonazePAM Tablet 2 milliGRAM(s) Oral at bedtime  doxazosin 4 milliGRAM(s) Oral at bedtime  enoxaparin Injectable 40 milliGRAM(s) SubCutaneous daily  gabapentin   Solution 300 milliGRAM(s) Oral three times a day  levothyroxine 50 MICROGram(s) Oral daily  multivitamin   Solution 15 milliLiter(s) Oral daily  nystatin Ointment 1 Application(s) Topical two times a day  pantoprazole   Suspension 40 milliGRAM(s) Oral daily  QUEtiapine 25 milliGRAM(s) Oral at bedtime  QUEtiapine 25 milliGRAM(s) Oral at bedtime PRN  valproic  acid Syrup 500 milliGRAM(s) Oral every 8 hours  zinc oxide 20% Ointment 1 Application(s) Topical two times a day        Vital Signs Last 24 Hrs  T(F): 97.4 (14 Aug 2018 07:40), Max: 97.8 (13 Aug 2018 22:30)  HR: 84 (14 Aug 2018 07:40) (74 - 84)  BP: 96/64 (14 Aug 2018 07:40) (96/64 - 100/60)  RR: 14 (14 Aug 2018 07:40) (14 - 14)  SpO2: 94% (14 Aug 2018 07:40) (94% - 94%)  I&O's Summary    13 Aug 2018 07:01  -  14 Aug 2018 07:00  --------------------------------------------------------  IN: 980 mL / OUT: 2010 mL / NET: -1030 mL        PHYSICAL EXAM:  General: NAD  ENT: MMM  Neck: Supple, No JVD  Lungs: Clear to auscultation bilaterally  Cardio: RRR, S1/S2, No murmurs  Abdomen: Soft, Nontender, Nondistended; Bowel sounds present  Extremities: No cyanosis, No edema  Neuro: no new deficits, moves all extremities, answers minimally  Skin: no rashes  Psych: calm, follows commands    LABS:  Reviewed                          CAPILLARY BLOOD GLUCOSE        07-27 [id number] 4.5          RADIOLOGY & ADDITIONAL TESTS:    Care Discussed with Consultants/Other Providers:   Rehab team

## 2018-08-14 NOTE — DISCHARGE NOTE ADULT - MEDICATION SUMMARY - MEDICATIONS TO TAKE
I will START or STAY ON the medications listed below when I get home from the hospital:    Flomax 0.4 mg oral capsule  -- 1 cap(s) by mouth once a day  -- Indication: For BPH    valproic acid 250 mg/5 mL oral syrup  -- 10 milliliter(s) by mouth 2 times a day  -- Indication: For Seizures    clonazePAM 0.5 mg oral tablet  -- by gastrostomy tube 2 times a day  -- Indication: For Mood    clonazePAM 2 mg oral tablet  -- 1 tab(s) by gastrostomy tube once a day (at bedtime)  -- Indication: For Mood    gabapentin 300 mg oral tablet  -- 1 tab(s) by mouth 3 times a day  -- Indication: For Seizures    QUEtiapine 25 mg oral tablet  -- 1 tab(s) by mouth once a day (at bedtime), As needed, give if agitation or insomnia continues 1 hour after scheduled dose  -- Indication: For Mood    bacitracin 500 units/g topical ointment  -- 1 application on skin once a day  -- Indication: For Skin    zinc oxide 20% topical ointment  -- 1 application on skin 2 times a day  -- Indication: For SKin    ocular lubricant ophthalmic solution  -- 2 drop(s) to each affected eye every 3 hours  -- Indication: For eye lubricant    Prevacid  -- 30 milligram(s) by gastrostomy tube  -- Indication: For Prevent stomach ulcer    levothyroxine 50 mcg (0.05 mg) oral tablet  -- 1 tab(s) by gastrostomy tube once a day  -- Indication: For Hypothyroid    Centrum oral tablet  -- 1 tab(s) by mouth once a day  -- Indication: For Nutrition

## 2018-08-14 NOTE — DISCHARGE NOTE ADULT - PROVIDER TOKENS
FREE:[LAST:[[email or web address]],FIRST:[[email or web address]],PHONE:[phone]],FAX:[(   [phone]    -],ADDRESS:[[place], [place]]],TOKEN:'[id number]',TOKEN:'[id number]'

## 2018-08-14 NOTE — DISCHARGE NOTE ADULT - HOSPITAL COURSE
This is a 51 y/o male with history of seizures, MR, and glaucoma found down at his group home. Patient went to bed at 9:30pm and patient was found down at 6:30am. He is nonverbal and has severe agitation at baseline. Patient was taken to [facility] where he was found to have a Left parafrontal epidural and a minimal subdural hematoma with no midline shift. C-spine shows no fractures or subluxations. Patient was intubated for altered mental status and transferred to [facility]. At [facility], pt was admitted to SICU. Neurosurgery was consulted and conservative management undertaken for subdural hemorrhage. Repeat CT imaging was stable. Patient was subsequently weaned from sedation and extubated on 7/27. PT/OT/Rehab services evaluated patient with recommendation for Acute Inpatient Rehab once medically stable. Patient became more lucid once transferred to floor. Central line and arterial line were discontinued. While on floor patient noted to have cellulitis around PEG site which abx were started for on 8/2/18. Total course of 10 days suggested. Patient now stable for discharge with follow up in clinics as outpatient. Of note patient also noted to be hypernatremic. Started on free water via PEG and showed gradual improvement. Pt deemed medically stable for d/c to [facility]'s Acute rehab on 8/6. In rehab, patient started on seroquel qHS for agitation. Maintained on 1:1 for impulsivity, aggressive behavior - mitts for biting his own fingers.  Completed course of doxycycline for cellulitis around PEG tube site.  Made functional progress in rehab currently stable for discharge home to group home.

## 2018-08-14 NOTE — PROGRESS NOTE ADULT - ATTENDING COMMENTS
Pt. seen.  Agree with documentation above as per resident. Patient medically stable. Making progress towards rehab goals.   here for training today.  d/c planning tomorrow
Pt. seen.  Agree with documentation above as per resident with amendments made as appropriate. Patient medically stable. Making progress towards rehab goals.   coming for training with therapy tomorrow.  d/c planning 8/15/18.
Pt. seen.  Agree with documentation above as per NP with amendments made as appropriate. Patient with elevated ammonia level.  Depakote level pending. Depakote toxicity can elevate ammonia.  Pt. appears to be at baseline mentation.  Will monitor.  If depakote level normal, will consider liver ultrasound as rec. by hospitalist.     Cont. 1:1 supervision for Impulsivity/agitation, and bilat. hand mittens to prevent self-mutilation/harm.      Cont. current Psych meds--consult pending.  Erythema at PEG site--ordered zinc oxide.

## 2018-08-14 NOTE — PROGRESS NOTE ADULT - PROBLEM SELECTOR PLAN 3
with behavioral problems and agitation. Continue Depakote. Continue home dose of Klonopin.  Cont. 1:1 supervision.  Pt. stable.   Seroquel 25mg QHS, and repeat 25mg if agitation or insomnia persists.  Cont. 1:1 supervision and hand mitts for safety

## 2018-08-14 NOTE — DISCHARGE NOTE ADULT - CARE PROVIDER_API CALL
fabi chandler  [place], [place]  Phone: [phone]  Fax: (   )    -    Mayito Cid), Emergency Medicine; Surgical Critical Care  [address] [address]  Phone: [phone]  Fax: [phone]    Priscilla Simmons (), Brain Injury Medicine; PhysicalRehab Medicine  [address] [place] [address]  Phone: [phone]  Fax: [phone]

## 2018-08-14 NOTE — DISCHARGE NOTE ADULT - CARE PLAN
Principal Discharge DX:	TBI (traumatic brain injury)  Goal:	Mod to Max Assist for ADLs, transfers; WC mobility with supervision  Assessment and plan of treatment:	Continue therapy as outpatient/group home  Secondary Diagnosis:	Seizures  Assessment and plan of treatment:	Continue depakote, gabapentin.  Follow up with PCP Dr. Cordero within 2 weeks  Secondary Diagnosis:	MR (mental retardation)  Assessment and plan of treatment:	Continue medications and treatment as prescribed.  Follow up with PCP within 2 weeks.  Secondary Diagnosis:	PEG (percutaneous endoscopic gastrostomy) status  Assessment and plan of treatment:	Currently NPO with meds and feeds via PEG. Will need ongoing swallowing evaluation/ aspiration precautions.  Follow up with PCP within 2 weeks.

## 2018-08-15 VITALS
SYSTOLIC BLOOD PRESSURE: 102 MMHG | OXYGEN SATURATION: 96 % | RESPIRATION RATE: 14 BRPM | DIASTOLIC BLOOD PRESSURE: 67 MMHG | HEART RATE: 79 BPM | TEMPERATURE: 98 F

## 2018-08-15 DIAGNOSIS — L25.9 UNSPECIFIED CONTACT DERMATITIS, UNSPECIFIED CAUSE: ICD-10-CM

## 2018-08-15 DIAGNOSIS — L03.90 CELLULITIS, UNSPECIFIED: ICD-10-CM

## 2018-08-15 DIAGNOSIS — S06.9X9S UNSPECIFIED INTRACRANIAL INJURY WITH LOSS OF CONSCIOUSNESS OF UNSPECIFIED DURATION, SEQUELA: ICD-10-CM

## 2018-08-15 PROCEDURE — 99233 SBSQ HOSP IP/OBS HIGH 50: CPT

## 2018-08-15 PROCEDURE — 99238 HOSP IP/OBS DSCHRG MGMT 30/<: CPT

## 2018-08-15 RX ADMIN — Medication 1 DROP(S): at 03:59

## 2018-08-15 RX ADMIN — ZINC OXIDE 1 APPLICATION(S): 200 OINTMENT TOPICAL at 05:08

## 2018-08-15 RX ADMIN — Medication 500 MILLIGRAM(S): at 05:07

## 2018-08-15 RX ADMIN — Medication 1 DROP(S): at 08:04

## 2018-08-15 RX ADMIN — Medication 50 MICROGRAM(S): at 05:08

## 2018-08-15 RX ADMIN — NYSTATIN CREAM 1 APPLICATION(S): 100000 CREAM TOPICAL at 05:08

## 2018-08-15 RX ADMIN — Medication 0.5 MILLIGRAM(S): at 05:07

## 2018-08-15 RX ADMIN — Medication 1 DROP(S): at 00:08

## 2018-08-15 RX ADMIN — CHLORHEXIDINE GLUCONATE 15 MILLILITER(S): 213 SOLUTION TOPICAL at 05:07

## 2018-08-15 RX ADMIN — GABAPENTIN 300 MILLIGRAM(S): 400 CAPSULE ORAL at 05:07

## 2018-08-15 RX ADMIN — Medication 1 DROP(S): at 05:07

## 2018-08-15 NOTE — PROGRESS NOTE ADULT - PROBLEM SELECTOR PROBLEM 7
Contact dermatitis, unspecified contact dermatitis type, unspecified trigger
PEG (percutaneous endoscopic gastrostomy) status

## 2018-08-15 NOTE — PROGRESS NOTE ADULT - PROBLEM SELECTOR PROBLEM 6
Cellulitis, unspecified cellulitis site
Decubitus ulcer of elbow, stage 2, unspecified laterality

## 2018-08-15 NOTE — PROGRESS NOTE ADULT - PROBLEM SELECTOR PLAN 1
Continue comprehensive PT, OT, and ST program.
Resolved, per rehab team pt completing 10 day course of doxycycline today, they will stop today.
Resolved, s/p Doxycycline
Start comprehensive PT, OT, and ST program.
f/u with home care therapy.
Cont. PT/OT
Continue comprehensive PT, OT, and ST program.

## 2018-08-15 NOTE — PROGRESS NOTE ADULT - PROBLEM SELECTOR PROBLEM 2
Seizures
TBI (traumatic brain injury)
TBI (traumatic brain injury)
Seizures
MR (mental retardation)
Seizures

## 2018-08-15 NOTE — PROGRESS NOTE ADULT - PROVIDER SPECIALTY LIST ADULT
Hospitalist
Rehab Medicine

## 2018-08-15 NOTE — PROGRESS NOTE ADULT - SUBJECTIVE AND OBJECTIVE BOX
HPI:  This is a 51 y/o male with history of seizures, MR, and glaucoma found down at his group home. Patient went to bed at 9:30pm and patient was found down at 6:30am. He is nonverbal and has severe agitation at baseline. Patient was taken to [facility] where he was found to have a Left parafrontal epidural and a minimal subdural hematoma with no midline shift. C-spine shows no fractures or subluxations. Patient was intubated for altered mental status and transferred to [facility].   At [facility], pt was admitted to SICU. Neurosurgery was consulted and conservative management undertaken for subdural hemorrhage. Repeat CT imaging was stable. Patient was subsequently weaned from sedation and extubated on 7/27. PT/OT/Rehab services evaluated patient with recommendation for Acute Inpatient Rehab once medically stable. Patient became more lucid once transferred to floor. Central line and arterial line were discontinued. While on floor patient noted to have cellulitis around PEG site which abx were started for on 8/2/18. Total course of 10 days suggested. Patient now stable for discharge with follow up in clinics as outpatient.      Subjective:  slept last night, no overnight issues, Pt. denies any complaints. discharge today.     REVIEW OF SYMPTOMS  MR--cognitive deficits, pt. was verbal at baseline, would communicate in short simple sentences to indicate wants and needs.  He was mod I with WC mobility in the home and needed one person assist for transfers and assistance with ADLs. Aggressive behaviors at times--will bite and scratch himself      VITALS  Vital Signs Last 24 Hrs  T(C): 36.9 (15 Aug 2018 08:19), Max: 37 (14 Aug 2018 23:00)  T(F): 98.4 (15 Aug 2018 08:19), Max: 98.6 (14 Aug 2018 23:00)  HR: 79 (15 Aug 2018 08:19) (72 - 79)  BP: 102/67 (15 Aug 2018 08:19) (101/61 - 102/67)  BP(mean): --  RR: 14 (15 Aug 2018 08:19) (14 - 14)  SpO2: 96% (15 Aug 2018 08:19) (96% - 96%)      PHYSICAL EXAM  Mental Status - able to communicate basic needs  following commands better  	Cranial Nerves -right eye paralysis, non-reactive.  Left eye movement appears WNL, no facial asymmetry, + tongue mobility  	Motor Exam - No noticeable focal deficits, moves all extremities equally  	Sensory: unable to fully assess LT, withdraws from painful stimuli  	Coord: unable to fully assess--impaired    	HEENT:  normocephalic, right eye closed--eyeball immobile		  	LUNGS:	Clear bilaterally 	  	HEART:	 Normal S1S2   No murmur RRR        	GI/ ABDOMEN:  Soft, erythema noted around peg tube insertion site improved   	EXTREMITIES:   healing bite marks on  wrists  SKIN: bilateral elbow wounds healed, erythema resolved.   Scattered healing scratches to arms and torso,  healing bite marks on wrists.     FUNCTIONAL PROGRESS  Gait - Wheelchair mobility 65 ft with supervision  ADLs: max A groom/bathing, mod A UBD, transfers  Functional transfer - Mod A.     RECENT LABS                  RADIOLOGY/OTHER RESULTS      MEDICATIONS  (STANDING):  artificial  tears Solution 1 Drop(s) Right EYE every 3 hours  ascorbic acid 500 milliGRAM(s) Oral two times a day  BACItracin   Ointment 1 Application(s) Topical daily  chlorhexidine 0.12% Liquid 15 milliLiter(s) Swish and Spit two times a day  clonazePAM Tablet 0.5 milliGRAM(s) Oral two times a day  clonazePAM Tablet 2 milliGRAM(s) Oral at bedtime  doxazosin 4 milliGRAM(s) Oral at bedtime  enoxaparin Injectable 40 milliGRAM(s) SubCutaneous daily  gabapentin   Solution 300 milliGRAM(s) Oral three times a day  levothyroxine 50 MICROGram(s) Oral daily  multivitamin   Solution 15 milliLiter(s) Oral daily  nystatin Ointment 1 Application(s) Topical two times a day  pantoprazole   Suspension 40 milliGRAM(s) Oral daily  QUEtiapine 25 milliGRAM(s) Oral at bedtime  valproic  acid Syrup 500 milliGRAM(s) Oral every 8 hours  zinc oxide 20% Ointment 1 Application(s) Topical two times a day    MEDICATIONS  (PRN):  QUEtiapine 25 milliGRAM(s) Oral at bedtime PRN give if agitation or insomnia continues 1 hour after scheduled dose

## 2018-08-15 NOTE — PROGRESS NOTE ADULT - PROBLEM SELECTOR PROBLEM 3
MR (mental retardation)
Seizures
Seizures
MR (mental retardation)
Seizures
MR (mental retardation)

## 2018-08-15 NOTE — PROGRESS NOTE ADULT - SUBJECTIVE AND OBJECTIVE BOX
Patient is a 52y old  Male who presents with a chief complaint of TBI (14 Aug 2018 12:01)      Patient seen and examined at bedside.    ALLERGIES:  sulfiSOXAZOLE ophthalmic (Unknown)    MEDICATIONS:  artificial  tears Solution 1 Drop(s) Right EYE every 3 hours  ascorbic acid 500 milliGRAM(s) Oral two times a day  BACItracin   Ointment 1 Application(s) Topical daily  chlorhexidine 0.12% Liquid 15 milliLiter(s) Swish and Spit two times a day  clonazePAM Tablet 0.5 milliGRAM(s) Oral two times a day  clonazePAM Tablet 2 milliGRAM(s) Oral at bedtime  doxazosin 4 milliGRAM(s) Oral at bedtime  enoxaparin Injectable 40 milliGRAM(s) SubCutaneous daily  gabapentin   Solution 300 milliGRAM(s) Oral three times a day  levothyroxine 50 MICROGram(s) Oral daily  multivitamin   Solution 15 milliLiter(s) Oral daily  nystatin Ointment 1 Application(s) Topical two times a day  pantoprazole   Suspension 40 milliGRAM(s) Oral daily  QUEtiapine 25 milliGRAM(s) Oral at bedtime  QUEtiapine 25 milliGRAM(s) Oral at bedtime PRN  valproic  acid Syrup 500 milliGRAM(s) Oral every 8 hours  zinc oxide 20% Ointment 1 Application(s) Topical two times a day    Vital Signs Last 24 Hrs  T(C): 36.9 (15 Aug 2018 08:19), Max: 37 (14 Aug 2018 23:00)  T(F): 98.4 (15 Aug 2018 08:19), Max: 98.6 (14 Aug 2018 23:00)  HR: 79 (15 Aug 2018 08:19) (72 - 79)  BP: 102/67 (15 Aug 2018 08:19) (101/61 - 102/67)  BP(mean): --  RR: 14 (15 Aug 2018 08:19) (14 - 14)  SpO2: 96% (15 Aug 2018 08:19) (96% - 96%)  I&O's Summary    14 Aug 2018 07:01  -  15 Aug 2018 07:00  --------------------------------------------------------  IN: 0 mL / OUT: 850 mL / NET: -850 mL          PAST MEDICAL & SURGICAL HISTORY:  Foot deformity, left  Thrombocytopenia  Blind right eye  MR (mental retardation)  Glaucoma  Seizures  S/P hernia repair      Home Medications:  bacitracin 500 units/g topical ointment: 1 application topically once a day (14 Aug 2018 12:09)  Centrum oral tablet: 1 tab(s) orally once a day (26 Jul 2018 11:49)  clonazePAM 0.5 mg oral tablet: by gastrostomy tube 2 times a day (26 Jul 2018 11:27)  clonazePAM 2 mg oral tablet: 1 tab(s) by gastrostomy tube once a day (at bedtime) (26 Jul 2018 11:27)  Flomax 0.4 mg oral capsule: 1 cap(s) orally once a day (26 Jul 2018 11:48)  gabapentin 300 mg oral tablet: 1 tab(s) orally 3 times a day (26 Jul 2018 11:27)  levothyroxine 50 mcg (0.05 mg) oral tablet: 1 tab(s) by gastrostomy tube once a day (26 Jul 2018 11:27)  ocular lubricant ophthalmic solution: 2 drop(s) to each affected eye every 3 hours (03 Aug 2018 09:21)  Prevacid: 30 milligram(s) by gastrostomy tube (26 Jul 2018 11:27)  valproic acid 250 mg/5 mL oral syrup: 10 milliliter(s) orally 2 times a day (03 Aug 2018 09:21)  zinc oxide 20% topical ointment: 1 application topically 2 times a day (14 Aug 2018 12:09)      PHYSICAL EXAM:  General: NAD, A/O x3  ENT: MMM  Neck: Supple, No JVD  Lungs: Clear to percussion bilaterally  Cardio: RRR, S1/S2, No murmurs  Abdomen:  PEG, Soft, Nontender, Nondistended; Bowel sounds present  Extremities: No clubbing, cyanosis, or edema    LABS: reviewed    07-27 [id number] 4.5    RADIOLOGY & ADDITIONAL TESTS: Reviewed    Care Discussed with Consultants/Other Providers: PM&R team Patient is a 52y old  Male who presents with a chief complaint of TBI (14 Aug 2018 12:01)      Patient seen and examined at bedside, no events overnight, in no acute distress.     ALLERGIES:  sulfiSOXAZOLE ophthalmic (Unknown)    MEDICATIONS:  artificial  tears Solution 1 Drop(s) Right EYE every 3 hours  ascorbic acid 500 milliGRAM(s) Oral two times a day  BACItracin   Ointment 1 Application(s) Topical daily  chlorhexidine 0.12% Liquid 15 milliLiter(s) Swish and Spit two times a day  clonazePAM Tablet 0.5 milliGRAM(s) Oral two times a day  clonazePAM Tablet 2 milliGRAM(s) Oral at bedtime  doxazosin 4 milliGRAM(s) Oral at bedtime  enoxaparin Injectable 40 milliGRAM(s) SubCutaneous daily  gabapentin   Solution 300 milliGRAM(s) Oral three times a day  levothyroxine 50 MICROGram(s) Oral daily  multivitamin   Solution 15 milliLiter(s) Oral daily  nystatin Ointment 1 Application(s) Topical two times a day  pantoprazole   Suspension 40 milliGRAM(s) Oral daily  QUEtiapine 25 milliGRAM(s) Oral at bedtime  QUEtiapine 25 milliGRAM(s) Oral at bedtime PRN  valproic  acid Syrup 500 milliGRAM(s) Oral every 8 hours  zinc oxide 20% Ointment 1 Application(s) Topical two times a day    Vital Signs Last 24 Hrs  T(C): 36.9 (15 Aug 2018 08:19), Max: 37 (14 Aug 2018 23:00)  T(F): 98.4 (15 Aug 2018 08:19), Max: 98.6 (14 Aug 2018 23:00)  HR: 79 (15 Aug 2018 08:19) (72 - 79)  BP: 102/67 (15 Aug 2018 08:19) (101/61 - 102/67)  BP(mean): --  RR: 14 (15 Aug 2018 08:19) (14 - 14)  SpO2: 96% (15 Aug 2018 08:19) (96% - 96%)  I&O's Summary    14 Aug 2018 07:01  -  15 Aug 2018 07:00  --------------------------------------------------------  IN: 0 mL / OUT: 850 mL / NET: -850 mL          PAST MEDICAL & SURGICAL HISTORY:  Foot deformity, left  Thrombocytopenia  Blind right eye  MR (mental retardation)  Glaucoma  Seizures  S/P hernia repair      Home Medications:  bacitracin 500 units/g topical ointment: 1 application topically once a day (14 Aug 2018 12:09)  Centrum oral tablet: 1 tab(s) orally once a day (26 Jul 2018 11:49)  clonazePAM 0.5 mg oral tablet: by gastrostomy tube 2 times a day (26 Jul 2018 11:27)  clonazePAM 2 mg oral tablet: 1 tab(s) by gastrostomy tube once a day (at bedtime) (26 Jul 2018 11:27)  Flomax 0.4 mg oral capsule: 1 cap(s) orally once a day (26 Jul 2018 11:48)  gabapentin 300 mg oral tablet: 1 tab(s) orally 3 times a day (26 Jul 2018 11:27)  levothyroxine 50 mcg (0.05 mg) oral tablet: 1 tab(s) by gastrostomy tube once a day (26 Jul 2018 11:27)  ocular lubricant ophthalmic solution: 2 drop(s) to each affected eye every 3 hours (03 Aug 2018 09:21)  Prevacid: 30 milligram(s) by gastrostomy tube (26 Jul 2018 11:27)  valproic acid 250 mg/5 mL oral syrup: 10 milliliter(s) orally 2 times a day (03 Aug 2018 09:21)  zinc oxide 20% topical ointment: 1 application topically 2 times a day (14 Aug 2018 12:09)      PHYSICAL EXAM:  General: NAD, A/O x3  ENT: MMM  Neck: Supple, No JVD  Lungs: Clear to percussion bilaterally  Cardio: RRR, S1/S2, No murmurs  Abdomen:  PEG, Soft, Nontender, Nondistended; Bowel sounds present  Extremities: No clubbing, cyanosis, or edema    LABS: reviewed    07-27 [id number] 4.5    RADIOLOGY & ADDITIONAL TESTS: Reviewed    Care Discussed with Consultants/Other Providers: PM&R team

## 2018-08-15 NOTE — PROGRESS NOTE ADULT - ASSESSMENT
This is a 53 y/o male with history of seizures and MR with new left parafrontal epidural and a minimal subdural hematoma/TBI now with functional deficits after sustaining fall.     Medically stable for discharge home today.  Discharge medications reviewed yesterday with  yesterday by Dr. Arredondo.

## 2018-08-15 NOTE — PROGRESS NOTE ADULT - PROBLEM SELECTOR PLAN 7
Nystatin power
Apply zinc oxide around peg tube site BID for erythema.--resolving
Apply zinc oxide around peg tube site BID for erythema.--resolving
Apply zinc oxide around peg tube site BID for erythema.
Apply zinc oxide around peg tube site BID for erythema.--resolving
PEG site care
PEG site care

## 2018-08-15 NOTE — PROGRESS NOTE ADULT - PROBLEM SELECTOR PROBLEM 5
Contact dermatitis
Decubitus ulcer of elbow, stage 2, unspecified laterality

## 2018-08-15 NOTE — PROGRESS NOTE ADULT - PROBLEM SELECTOR PROBLEM 4
Cellulitis
MR (mental retardation)
MR (mental retardation)
Cellulitis
PEG (percutaneous endoscopic gastrostomy) status

## 2018-08-15 NOTE — PROGRESS NOTE ADULT - PROBLEM SELECTOR PLAN 3
with behavioral problems and agitation. Continue Depakote. Continue home dose of Klonopin. Seroquel 25mg QHS, Pt. stable.

## 2018-08-15 NOTE — PROGRESS NOTE ADULT - PROBLEM SELECTOR PROBLEM 1
TBI (traumatic brain injury)
Cellulitis
Cellulitis
TBI (traumatic brain injury)
Traumatic brain injury with loss of consciousness, sequela
TBI (traumatic brain injury)

## 2018-08-15 NOTE — PROGRESS NOTE ADULT - PROBLEM SELECTOR PLAN 5
Resolved
Nystatin powder, start toileting program
Nystatin powder, start toileting program
Nystatin powder
Nystatin powder
Nystatin powder, start toileting program

## 2018-08-15 NOTE — PROGRESS NOTE ADULT - ASSESSMENT
51 y/o male with PM<H of seizures, MR, p/w TBI and left parafrontal epidural minimal subdural hematoma s/p fall.

## 2018-08-15 NOTE — PROGRESS NOTE ADULT - PROBLEM SELECTOR PLAN 6
Finished course of doxycycline  Resolved
Resolved, d/c hydrogel.  keep elbow pads for protection.   Apply bacitracin to bilateral wrists
Resolved, d/c hydrogel.  keep elbow pads for protection.   Apply bacitracin to bilateral wrists
Apply hydrogel daily and elbow pads, reposition q2 hrs
Resolved
Resolved
Resolved, d/c hydrogel.  keep elbow pads for protection.   Apply bacitracin to bilateral wrists

## 2018-08-21 DIAGNOSIS — Z43.1 ENCOUNTER FOR ATTENTION TO GASTROSTOMY: ICD-10-CM

## 2018-08-21 DIAGNOSIS — H40.9 UNSPECIFIED GLAUCOMA: ICD-10-CM

## 2018-08-21 DIAGNOSIS — Z51.89 ENCOUNTER FOR OTHER SPECIFIED AFTERCARE: ICD-10-CM

## 2018-08-21 DIAGNOSIS — Z90.01 ACQUIRED ABSENCE OF EYE: ICD-10-CM

## 2018-08-21 DIAGNOSIS — Z91.81 HISTORY OF FALLING: ICD-10-CM

## 2018-08-21 DIAGNOSIS — L03.311 CELLULITIS OF ABDOMINAL WALL: ICD-10-CM

## 2018-08-21 DIAGNOSIS — G40.909 EPILEPSY, UNSPECIFIED, NOT INTRACTABLE, WITHOUT STATUS EPILEPTICUS: ICD-10-CM

## 2018-08-21 DIAGNOSIS — L89.022 PRESSURE ULCER OF LEFT ELBOW, STAGE 2: ICD-10-CM

## 2018-08-21 DIAGNOSIS — R47.1 DYSARTHRIA AND ANARTHRIA: ICD-10-CM

## 2018-08-21 DIAGNOSIS — R45.1 RESTLESSNESS AND AGITATION: ICD-10-CM

## 2018-08-21 DIAGNOSIS — M24.572 CONTRACTURE, LEFT ANKLE: ICD-10-CM

## 2018-08-21 DIAGNOSIS — S06.4X9D: ICD-10-CM

## 2018-08-21 DIAGNOSIS — L25.9 UNSPECIFIED CONTACT DERMATITIS, UNSPECIFIED CAUSE: ICD-10-CM

## 2018-08-21 DIAGNOSIS — R41.840 ATTENTION AND CONCENTRATION DEFICIT: ICD-10-CM

## 2018-08-21 DIAGNOSIS — F79 UNSPECIFIED INTELLECTUAL DISABILITIES: ICD-10-CM

## 2018-08-21 DIAGNOSIS — L89.012 PRESSURE ULCER OF RIGHT ELBOW, STAGE 2: ICD-10-CM

## 2018-08-21 DIAGNOSIS — S06.5X9D TRAUMATIC SUBDURAL HEMORRHAGE WITH LOSS OF CONSCIOUSNESS OF UNSPECIFIED DURATION, SUBSEQUENT ENCOUNTER: ICD-10-CM

## 2018-08-21 DIAGNOSIS — R33.9 RETENTION OF URINE, UNSPECIFIED: ICD-10-CM

## 2018-08-21 DIAGNOSIS — K94.22 GASTROSTOMY INFECTION: ICD-10-CM

## 2018-08-22 ENCOUNTER — OUTPATIENT (OUTPATIENT)
Dept: OUTPATIENT SERVICES | Facility: HOSPITAL | Age: 53
LOS: 1 days | End: 2018-08-22
Payer: MEDICARE

## 2018-08-22 DIAGNOSIS — Z98.890 OTHER SPECIFIED POSTPROCEDURAL STATES: Chronic | ICD-10-CM

## 2018-08-22 PROCEDURE — 70450 CT HEAD/BRAIN W/O DYE: CPT | Mod: 26

## 2018-10-06 ENCOUNTER — OUTPATIENT (OUTPATIENT)
Dept: OUTPATIENT SERVICES | Facility: HOSPITAL | Age: 53
LOS: 1 days | End: 2018-10-06

## 2018-10-06 DIAGNOSIS — Z98.890 OTHER SPECIFIED POSTPROCEDURAL STATES: Chronic | ICD-10-CM

## 2018-10-13 ENCOUNTER — EMERGENCY (EMERGENCY)
Facility: HOSPITAL | Age: 53
LOS: 1 days | End: 2018-10-13
Payer: MEDICARE

## 2018-10-13 DIAGNOSIS — Z98.890 OTHER SPECIFIED POSTPROCEDURAL STATES: Chronic | ICD-10-CM

## 2018-10-13 PROCEDURE — 76080 X-RAY EXAM OF FISTULA: CPT | Mod: 26

## 2018-10-13 PROCEDURE — 99283 EMERGENCY DEPT VISIT LOW MDM: CPT | Mod: 25

## 2018-10-13 PROCEDURE — 43760 CHANGE GASTROSTOMY TUBE PERCUTANEOUS W/O GUIDE: CPT

## 2018-10-24 PROCEDURE — 92610 EVALUATE SWALLOWING FUNCTION: CPT

## 2018-10-24 PROCEDURE — 97535 SELF CARE MNGMENT TRAINING: CPT

## 2018-10-24 PROCEDURE — 80164 ASSAY DIPROPYLACETIC ACD TOT: CPT

## 2018-10-24 PROCEDURE — 97116 GAIT TRAINING THERAPY: CPT

## 2018-10-24 PROCEDURE — 80048 BASIC METABOLIC PNL TOTAL CA: CPT

## 2018-10-24 PROCEDURE — 82140 ASSAY OF AMMONIA: CPT

## 2018-10-24 PROCEDURE — 97163 PT EVAL HIGH COMPLEX 45 MIN: CPT

## 2018-10-24 PROCEDURE — 92523 SPEECH SOUND LANG COMPREHEN: CPT

## 2018-10-24 PROCEDURE — 97530 THERAPEUTIC ACTIVITIES: CPT

## 2018-10-24 PROCEDURE — 92507 TX SP LANG VOICE COMM INDIV: CPT

## 2018-10-24 PROCEDURE — 93005 ELECTROCARDIOGRAM TRACING: CPT

## 2018-10-24 PROCEDURE — 80053 COMPREHEN METABOLIC PANEL: CPT

## 2018-10-24 PROCEDURE — 97112 NEUROMUSCULAR REEDUCATION: CPT

## 2018-10-24 PROCEDURE — 85027 COMPLETE CBC AUTOMATED: CPT

## 2018-10-24 PROCEDURE — 84134 ASSAY OF PREALBUMIN: CPT

## 2018-10-24 PROCEDURE — 97110 THERAPEUTIC EXERCISES: CPT

## 2018-10-29 ENCOUNTER — OUTPATIENT (OUTPATIENT)
Dept: OUTPATIENT SERVICES | Facility: HOSPITAL | Age: 53
LOS: 1 days | End: 2018-10-29
Payer: MEDICARE

## 2018-10-29 DIAGNOSIS — Z98.890 OTHER SPECIFIED POSTPROCEDURAL STATES: Chronic | ICD-10-CM

## 2018-10-29 PROCEDURE — 74178 CT ABD&PLV WO CNTR FLWD CNTR: CPT | Mod: 26

## 2018-11-20 NOTE — PATIENT PROFILE ADULT. - HEALTH/HEALTHCARE ANXIETIES, PROFILE
"Problem: Cardiac: Heart Failure (Adult)  Goal: Signs and Symptoms of Listed Potential Problems Will be Absent, Minimized or Managed (Cardiac: Heart Failure)  Signs and symptoms of listed potential problems will be absent, minimized or managed by discharge/transition of care (reference Cardiac: Heart Failure (Adult) CPG).   Outcome: Improving  D:Up ad moshe in room.  Reports feeling \"better\",  But is feeling \"depressed about the liver information\".   States he's not sure of what do and is feeling overwhelmed with the \"bad news\".   Was seen in room this morning by Patricia Bae NP from GI/Hepatology.  Was seen in room for home infusion teaching for home milrinone by ARAVIND Rodríguez ([facility]).  Also,  Seen in room at bedside for PICC line teaching and Milrinone infusion Cadd pump teaching per ARAVIND Santacruz from [facility].   Patient gave a return demonstration of pump function and bag changing.   On milrinone .125mcg/kg/min :3.1ml/hr.   Rhythm is NSR with frequent PVC's.  K+ 3.5 and was given supplemental Potassium per protacol.  Mg 2.2.  Temp 97.6  Bp 109/77 MAP 86.  O2 sat 96% on room air.    A;Feeling down and is concerned with what results of Liver biopsy will be and medical recommendation regarding LVAD.   Did well with teaching for Milrinone home infusion and PICC line teaching.  Overall,  Is feeling better.   Rhythm is stable.   AVSS.  Sating normal on room air.    P;Continue to encourage to ventilate feeling and listen.  Continue with current medical plan of care.   Monitor rhythm and vital signs and electrolytes.  Discharge to  Home tomorrow.      " unable to assess/ mental retardation

## 2018-12-01 ENCOUNTER — EMERGENCY (EMERGENCY)
Facility: HOSPITAL | Age: 53
LOS: 1 days | End: 2018-12-01
Payer: MEDICARE

## 2018-12-01 DIAGNOSIS — Z98.890 OTHER SPECIFIED POSTPROCEDURAL STATES: Chronic | ICD-10-CM

## 2018-12-01 PROCEDURE — 99283 EMERGENCY DEPT VISIT LOW MDM: CPT

## 2018-12-01 PROCEDURE — 76080 X-RAY EXAM OF FISTULA: CPT | Mod: 26

## 2018-12-04 PROBLEM — H54.40 BLINDNESS, ONE EYE, UNSPECIFIED EYE: Chronic | Status: ACTIVE | Noted: 2018-08-06

## 2018-12-04 PROBLEM — D69.6 THROMBOCYTOPENIA, UNSPECIFIED: Chronic | Status: ACTIVE | Noted: 2018-08-06

## 2018-12-04 PROBLEM — M21.962 UNSPECIFIED ACQUIRED DEFORMITY OF LEFT LOWER LEG: Chronic | Status: ACTIVE | Noted: 2018-08-06

## 2018-12-05 NOTE — DISCHARGE NOTE ADULT - PROVIDER TOKENS
Telephone Encounter by Chelsea Greene at 08/27/18 09:58 AM     Author:  Chelsea Greene Service:  (none) Author Type:  Patient      Filed:  08/27/18 10:01 AM Encounter Date:  8/27/2018 Status:  Signed     :  Chelsea Greene (Patient )              SHERICE SINCLAIR    Patient Age: 71 year old    ACCT STATUS:   MESSAGE:[JS1.1T]   Neetu Nurse  states pt had EMG done on 8/24 at [facility] , states she will fax results over .  Neetu is wanting to know if pt is to f/u for EMG prior or after ASC injection?   Please advise.[JS1.1M]   Next and Last Visit with Provider and Department  Next visit with MAGALY MOSQUERA is on No match found  Next visit with PAIN MANAGEMENT is on No match found  Last visit with MAGALY MOSQUERA was on 07/10/2018 at  9:30 AM [facility]  Last visit with PAIN MANAGEMENT was on 07/10/2018 at  9:30 AM in [facility]     WEIGHT AND HEIGHT: As of 07/10/2018 weight is 172 lbs.(78.019 kg). Height is 5' 2\"(1.575 m).   BMI is 31.45 kg/(m^2) calculated from:     Height 5' 2\" (1.575 m) as of 7/10/18     Weight 172 lb (78.019 kg) as of 7/10/18      No Known Allergies  Current outpatient prescriptions       Medication  Sig Dispense Refill   • duloxetine (CYMBALTA) 30 MG Cap Take 1 Cap by mouth daily. 30 Cap 3   • hydrocodone-acetaminophen (NORCO) 7.5-325 MG per tablet Take 1 Tab by mouth every 6 (six) hours as needed for Pain. 40 Tab 0   • gabapentin (NEURONTIN) 600 MG tablet Take 1 Tab by mouth 3 (three) times daily. 90 Tab 3   • celecoxib (CELEBREX) 200 MG Cap Take 1 Cap by mouth daily. 30 Cap 3   • levothyroxine 100 MCG tablet TAKE 1 TAB BY MOUTH DAILY. 90 Tab 1   • amlodipine (NORVASC) 5 MG tablet TAKE 1 TAB BY MOUTH 2 (TWO) TIMES DAILY. 180 Tab 1   • atorvastatin (LIPITOR) 40 MG tablet TAKE 1 TAB BY MOUTH DAILY. 90 Tab 1   • Aflibercept (EYLEA) 2 MG/0.05ML SOLN by Intravitreal route.     •  Ergocalciferol (VITAMIN D OR) Take 8,000 Units by mouth daily.     • estradiol (ESTRACE) 0.1 MG/GM vaginal cream Place 2 g vaginally 3 (three) times a week.        PHARMACY to use:[JS1.1T] na[JS1.1M]          Pharmacy preference(s) on file:    [facility] [address]  [facility]    CALL BACK INFO:[JS1.1T] Ok to leave response (including medical information) with family member or on answering machine[JS1.1M]  ROUTING:[JS1.1T] Patient's physician/staff[JS1.1M]        PCP: Salvador Palencia MD, MD         INS: Payor: MEDICARE - ASSIGNED / Plan: *No Plan* / Product Type: *No Product type* / Note: This is the primary coverage, but no account was found for this location or the patient's primary location.   ADDRESS:  [address] [place] [id number][JS1.1T]       Revision History        User Key Date/Time User Provider Type Action    > JS1.1 08/27/18 10:01 AM Chelsea Greene Patient  Sign    M - Manual, T - Template             FREE:[LAST:[[facility]],PHONE:[phone]],FAX:[(   )    -],ADDRESS:[[address] [place] [address], [address] [place] [address]]]

## 2018-12-07 ENCOUNTER — EMERGENCY (EMERGENCY)
Facility: HOSPITAL | Age: 53
LOS: 1 days | End: 2018-12-07
Payer: MEDICARE

## 2018-12-07 DIAGNOSIS — Z98.890 OTHER SPECIFIED POSTPROCEDURAL STATES: Chronic | ICD-10-CM

## 2018-12-07 PROCEDURE — 43760 CHANGE GASTROSTOMY TUBE PERCUTANEOUS W/O GUIDE: CPT

## 2018-12-07 PROCEDURE — 76080 X-RAY EXAM OF FISTULA: CPT | Mod: 26

## 2018-12-07 PROCEDURE — 99283 EMERGENCY DEPT VISIT LOW MDM: CPT | Mod: 25

## 2018-12-18 ENCOUNTER — EMERGENCY (EMERGENCY)
Facility: HOSPITAL | Age: 53
LOS: 1 days | End: 2018-12-18
Payer: MEDICARE

## 2018-12-18 DIAGNOSIS — Z98.890 OTHER SPECIFIED POSTPROCEDURAL STATES: Chronic | ICD-10-CM

## 2018-12-18 PROCEDURE — 76080 X-RAY EXAM OF FISTULA: CPT | Mod: 26

## 2018-12-18 PROCEDURE — 99283 EMERGENCY DEPT VISIT LOW MDM: CPT

## 2018-12-19 ENCOUNTER — EMERGENCY (EMERGENCY)
Facility: HOSPITAL | Age: 53
LOS: 1 days | End: 2018-12-19
Payer: MEDICARE

## 2018-12-19 DIAGNOSIS — Z98.890 OTHER SPECIFIED POSTPROCEDURAL STATES: Chronic | ICD-10-CM

## 2018-12-19 PROCEDURE — 43760 CHANGE GASTROSTOMY TUBE PERCUTANEOUS W/O GUIDE: CPT

## 2018-12-19 PROCEDURE — 99283 EMERGENCY DEPT VISIT LOW MDM: CPT | Mod: 25

## 2018-12-19 PROCEDURE — 76080 X-RAY EXAM OF FISTULA: CPT | Mod: 26

## 2019-01-01 ENCOUNTER — OUTPATIENT (OUTPATIENT)
Dept: OUTPATIENT SERVICES | Facility: HOSPITAL | Age: 54
LOS: 1 days | End: 2019-01-01

## 2019-01-01 ENCOUNTER — APPOINTMENT (OUTPATIENT)
Dept: ULTRASOUND IMAGING | Facility: CLINIC | Age: 54
End: 2019-01-01

## 2019-01-01 ENCOUNTER — INPATIENT (INPATIENT)
Facility: HOSPITAL | Age: 54
LOS: 18 days | Discharge: HOME | End: 2019-08-08
Payer: MEDICARE

## 2019-01-01 ENCOUNTER — APPOINTMENT (OUTPATIENT)
Dept: RADIOLOGY | Facility: CLINIC | Age: 54
End: 2019-01-01

## 2019-01-01 ENCOUNTER — INPATIENT (INPATIENT)
Facility: HOSPITAL | Age: 54
LOS: 14 days | Discharge: HOME | End: 2019-12-31
Payer: MEDICARE

## 2019-01-01 ENCOUNTER — EMERGENCY (EMERGENCY)
Facility: HOSPITAL | Age: 54
LOS: 1 days | End: 2019-01-01
Admitting: EMERGENCY MEDICINE
Payer: MEDICARE

## 2019-01-01 ENCOUNTER — INPATIENT (INPATIENT)
Facility: HOSPITAL | Age: 54
LOS: 5 days | Discharge: HOME | End: 2019-09-05
Payer: MEDICARE

## 2019-01-01 ENCOUNTER — INPATIENT (INPATIENT)
Facility: HOSPITAL | Age: 54
LOS: 11 days | Discharge: NURSING, REHAB OR LONG-TERM CARE | End: 2019-10-11
Admitting: FAMILY MEDICINE
Payer: MEDICARE

## 2019-01-01 DIAGNOSIS — Z98.890 OTHER SPECIFIED POSTPROCEDURAL STATES: Chronic | ICD-10-CM

## 2019-01-01 PROCEDURE — 71045 X-RAY EXAM CHEST 1 VIEW: CPT | Mod: 26

## 2019-01-01 PROCEDURE — 99291 CRITICAL CARE FIRST HOUR: CPT

## 2019-01-01 PROCEDURE — 71046 X-RAY EXAM CHEST 2 VIEWS: CPT | Mod: 26

## 2019-01-01 PROCEDURE — 99223 1ST HOSP IP/OBS HIGH 75: CPT

## 2019-01-01 PROCEDURE — 71250 CT THORAX DX C-: CPT | Mod: 26

## 2019-01-01 PROCEDURE — 71045 X-RAY EXAM CHEST 1 VIEW: CPT | Mod: 26,77

## 2019-01-01 PROCEDURE — 99233 SBSQ HOSP IP/OBS HIGH 50: CPT

## 2019-01-01 PROCEDURE — 76705 ECHO EXAM OF ABDOMEN: CPT | Mod: 26

## 2019-01-01 PROCEDURE — 93306 TTE W/DOPPLER COMPLETE: CPT | Mod: 26

## 2019-01-01 PROCEDURE — 93010 ELECTROCARDIOGRAM REPORT: CPT | Mod: 77

## 2019-01-01 PROCEDURE — 76080 X-RAY EXAM OF FISTULA: CPT | Mod: 26

## 2019-01-01 PROCEDURE — 99285 EMERGENCY DEPT VISIT HI MDM: CPT

## 2019-01-01 PROCEDURE — 99232 SBSQ HOSP IP/OBS MODERATE 35: CPT

## 2019-01-01 PROCEDURE — 93971 EXTREMITY STUDY: CPT | Mod: 26,LT

## 2019-01-01 PROCEDURE — 74018 RADEX ABDOMEN 1 VIEW: CPT | Mod: 26

## 2019-01-01 PROCEDURE — 70450 CT HEAD/BRAIN W/O DYE: CPT | Mod: 26

## 2019-01-01 PROCEDURE — 74019 RADEX ABDOMEN 2 VIEWS: CPT | Mod: 26

## 2019-01-01 PROCEDURE — 93010 ELECTROCARDIOGRAM REPORT: CPT

## 2019-01-01 PROCEDURE — 71260 CT THORAX DX C+: CPT | Mod: 26

## 2019-01-01 PROCEDURE — 99283 EMERGENCY DEPT VISIT LOW MDM: CPT

## 2019-01-01 PROCEDURE — 74177 CT ABD & PELVIS W/CONTRAST: CPT | Mod: 26

## 2019-01-16 ENCOUNTER — OUTPATIENT (OUTPATIENT)
Dept: OUTPATIENT SERVICES | Facility: HOSPITAL | Age: 54
LOS: 1 days | End: 2019-01-16
Payer: MEDICARE

## 2019-01-16 DIAGNOSIS — Z98.890 OTHER SPECIFIED POSTPROCEDURAL STATES: Chronic | ICD-10-CM

## 2019-01-16 PROCEDURE — 71046 X-RAY EXAM CHEST 2 VIEWS: CPT | Mod: 26

## 2019-02-12 ENCOUNTER — OUTPATIENT (OUTPATIENT)
Dept: OUTPATIENT SERVICES | Facility: HOSPITAL | Age: 54
LOS: 1 days | End: 2019-02-12
Payer: MEDICARE

## 2019-02-12 DIAGNOSIS — Z98.890 OTHER SPECIFIED POSTPROCEDURAL STATES: Chronic | ICD-10-CM

## 2019-02-12 PROCEDURE — 71046 X-RAY EXAM CHEST 2 VIEWS: CPT | Mod: 26

## 2019-02-19 ENCOUNTER — OUTPATIENT (OUTPATIENT)
Dept: OUTPATIENT SERVICES | Facility: HOSPITAL | Age: 54
LOS: 1 days | End: 2019-02-19

## 2019-02-19 ENCOUNTER — INPATIENT (INPATIENT)
Facility: HOSPITAL | Age: 54
LOS: 7 days | Discharge: HOME | End: 2019-02-27
Payer: MEDICARE

## 2019-02-19 DIAGNOSIS — Z98.890 OTHER SPECIFIED POSTPROCEDURAL STATES: Chronic | ICD-10-CM

## 2019-02-19 PROCEDURE — 99285 EMERGENCY DEPT VISIT HI MDM: CPT

## 2019-02-19 PROCEDURE — 71046 X-RAY EXAM CHEST 2 VIEWS: CPT | Mod: 26

## 2019-02-20 ENCOUNTER — OUTPATIENT (OUTPATIENT)
Dept: OUTPATIENT SERVICES | Facility: HOSPITAL | Age: 54
LOS: 1 days | End: 2019-02-20

## 2019-02-20 DIAGNOSIS — Z98.890 OTHER SPECIFIED POSTPROCEDURAL STATES: Chronic | ICD-10-CM

## 2019-02-21 ENCOUNTER — OUTPATIENT (OUTPATIENT)
Dept: OUTPATIENT SERVICES | Facility: HOSPITAL | Age: 54
LOS: 1 days | End: 2019-02-21

## 2019-02-21 DIAGNOSIS — Z98.890 OTHER SPECIFIED POSTPROCEDURAL STATES: Chronic | ICD-10-CM

## 2019-02-21 PROCEDURE — 76080 X-RAY EXAM OF FISTULA: CPT | Mod: 26

## 2019-02-22 ENCOUNTER — OUTPATIENT (OUTPATIENT)
Dept: OUTPATIENT SERVICES | Facility: HOSPITAL | Age: 54
LOS: 1 days | End: 2019-02-22

## 2019-02-22 DIAGNOSIS — Z98.890 OTHER SPECIFIED POSTPROCEDURAL STATES: Chronic | ICD-10-CM

## 2019-02-22 PROCEDURE — 71045 X-RAY EXAM CHEST 1 VIEW: CPT | Mod: 26

## 2019-02-23 ENCOUNTER — OUTPATIENT (OUTPATIENT)
Dept: OUTPATIENT SERVICES | Facility: HOSPITAL | Age: 54
LOS: 1 days | End: 2019-02-23

## 2019-02-23 DIAGNOSIS — Z98.890 OTHER SPECIFIED POSTPROCEDURAL STATES: Chronic | ICD-10-CM

## 2019-02-23 PROCEDURE — 71250 CT THORAX DX C-: CPT | Mod: 26

## 2019-02-24 ENCOUNTER — OUTPATIENT (OUTPATIENT)
Dept: OUTPATIENT SERVICES | Facility: HOSPITAL | Age: 54
LOS: 1 days | End: 2019-02-24

## 2019-02-24 DIAGNOSIS — Z98.890 OTHER SPECIFIED POSTPROCEDURAL STATES: Chronic | ICD-10-CM

## 2019-02-25 ENCOUNTER — OUTPATIENT (OUTPATIENT)
Dept: OUTPATIENT SERVICES | Facility: HOSPITAL | Age: 54
LOS: 1 days | End: 2019-02-25

## 2019-02-25 DIAGNOSIS — Z98.890 OTHER SPECIFIED POSTPROCEDURAL STATES: Chronic | ICD-10-CM

## 2019-02-25 PROCEDURE — 76080 X-RAY EXAM OF FISTULA: CPT | Mod: 26

## 2019-02-26 ENCOUNTER — OUTPATIENT (OUTPATIENT)
Dept: OUTPATIENT SERVICES | Facility: HOSPITAL | Age: 54
LOS: 1 days | End: 2019-02-26

## 2019-02-26 DIAGNOSIS — Z98.890 OTHER SPECIFIED POSTPROCEDURAL STATES: Chronic | ICD-10-CM

## 2019-03-06 ENCOUNTER — OUTPATIENT (OUTPATIENT)
Dept: OUTPATIENT SERVICES | Facility: HOSPITAL | Age: 54
LOS: 1 days | End: 2019-03-06
Payer: MEDICARE

## 2019-03-06 VITALS
HEIGHT: 65 IN | WEIGHT: 138.89 LBS | TEMPERATURE: 100 F | RESPIRATION RATE: 14 BRPM | OXYGEN SATURATION: 95 % | SYSTOLIC BLOOD PRESSURE: 110 MMHG | DIASTOLIC BLOOD PRESSURE: 70 MMHG | HEART RATE: 80 BPM

## 2019-03-06 DIAGNOSIS — R56.9 UNSPECIFIED CONVULSIONS: ICD-10-CM

## 2019-03-06 DIAGNOSIS — Z01.818 ENCOUNTER FOR OTHER PREPROCEDURAL EXAMINATION: ICD-10-CM

## 2019-03-06 DIAGNOSIS — Z98.890 OTHER SPECIFIED POSTPROCEDURAL STATES: Chronic | ICD-10-CM

## 2019-03-06 DIAGNOSIS — K02.61 DENTAL CARIES ON SMOOTH SURFACE LIMITED TO ENAMEL: ICD-10-CM

## 2019-03-06 DIAGNOSIS — K05.6 PERIODONTAL DISEASE, UNSPECIFIED: ICD-10-CM

## 2019-03-06 DIAGNOSIS — K02.62 DENTAL CARIES ON SMOOTH SURFACE PENETRATING INTO DENTIN: ICD-10-CM

## 2019-03-06 PROCEDURE — 71045 X-RAY EXAM CHEST 1 VIEW: CPT | Mod: 26

## 2019-03-06 PROCEDURE — G0463: CPT

## 2019-03-06 PROCEDURE — 71045 X-RAY EXAM CHEST 1 VIEW: CPT

## 2019-03-06 RX ORDER — SODIUM CHLORIDE 9 MG/ML
3 INJECTION INTRAMUSCULAR; INTRAVENOUS; SUBCUTANEOUS EVERY 8 HOURS
Qty: 0 | Refills: 0 | Status: DISCONTINUED | OUTPATIENT
Start: 2019-03-20 | End: 2019-04-04

## 2019-03-06 RX ORDER — GABAPENTIN 400 MG/1
1 CAPSULE ORAL
Qty: 0 | Refills: 0 | COMMUNITY

## 2019-03-06 RX ORDER — TAMSULOSIN HYDROCHLORIDE 0.4 MG/1
1 CAPSULE ORAL
Qty: 0 | Refills: 0 | COMMUNITY

## 2019-03-06 RX ORDER — MULTIVIT-MIN/FERROUS GLUCONATE 9 MG/15 ML
1 LIQUID (ML) ORAL
Qty: 0 | Refills: 0 | COMMUNITY

## 2019-03-06 NOTE — H&P PST ADULT - HISTORY OF PRESENT ILLNESS
8/2018- [facility], 52-year-old male s/p fall with Left subdural hematoma and interval development  of cerebral contusions. 54 yo male. h/o cerebral palsy, severe mental retardation, seizure disorder (IGHL hyacinth grimes stated pt had not had any seizure for years). anxiety. presents to PST scheduled for comprehensive dental treatment under general anesthesia.  Hyacinth Grimes stated pt was hospitalized at [facility] for pneumonia in February 2019, pneumonia symptoms resolved per Hyacinth.  pt to see PCP for preop evaluation with PCP, Dr. Cordero next week.

## 2019-03-06 NOTE — H&P PST ADULT - SYMPTOMS
[facility] does not notice any symptoms of cardiac or respiratory distress

## 2019-03-06 NOTE — H&P PST ADULT - PSH
H/O oral surgery  dental work under general anesthesia  History of nasal surgery  s/p repair of nasal bridge fracture  S/P hernia repair

## 2019-03-06 NOTE — H&P PST ADULT - PMH
Glaucoma    MR (mental retardation)    Seizures Blind right eye    Cryptorchidism    Foot deformity, left    Glaucoma    Hypothyroid    Leukopenia    MR (mental retardation)    Seizures    Thrombocytopenia

## 2019-03-19 ENCOUNTER — TRANSCRIPTION ENCOUNTER (OUTPATIENT)
Age: 54
End: 2019-03-19

## 2019-03-20 ENCOUNTER — OUTPATIENT (OUTPATIENT)
Dept: OUTPATIENT SERVICES | Facility: HOSPITAL | Age: 54
LOS: 1 days | End: 2019-03-20
Payer: MEDICARE

## 2019-03-20 VITALS
OXYGEN SATURATION: 96 % | HEIGHT: 65 IN | WEIGHT: 138.89 LBS | RESPIRATION RATE: 14 BRPM | SYSTOLIC BLOOD PRESSURE: 110 MMHG | DIASTOLIC BLOOD PRESSURE: 70 MMHG | HEART RATE: 80 BPM | TEMPERATURE: 100 F

## 2019-03-20 VITALS
HEART RATE: 67 BPM | OXYGEN SATURATION: 100 % | RESPIRATION RATE: 15 BRPM | SYSTOLIC BLOOD PRESSURE: 112 MMHG | DIASTOLIC BLOOD PRESSURE: 77 MMHG

## 2019-03-20 DIAGNOSIS — Z98.890 OTHER SPECIFIED POSTPROCEDURAL STATES: Chronic | ICD-10-CM

## 2019-03-20 DIAGNOSIS — Z01.818 ENCOUNTER FOR OTHER PREPROCEDURAL EXAMINATION: ICD-10-CM

## 2019-03-20 DIAGNOSIS — K02.62 DENTAL CARIES ON SMOOTH SURFACE PENETRATING INTO DENTIN: ICD-10-CM

## 2019-03-20 DIAGNOSIS — K05.6 PERIODONTAL DISEASE, UNSPECIFIED: ICD-10-CM

## 2019-03-20 PROCEDURE — D2335: CPT

## 2019-03-20 PROCEDURE — D4341: CPT

## 2019-03-20 PROCEDURE — D4210: CPT

## 2019-03-20 RX ORDER — SALIVA SUBSTITUTE COMB NO.11 351 MG
1 POWDER IN PACKET (EA) MUCOUS MEMBRANE
Qty: 0 | Refills: 0 | COMMUNITY

## 2019-03-20 RX ORDER — SODIUM CHLORIDE 9 MG/ML
1000 INJECTION, SOLUTION INTRAVENOUS
Qty: 0 | Refills: 0 | Status: DISCONTINUED | OUTPATIENT
Start: 2019-03-20 | End: 2019-04-04

## 2019-03-20 RX ORDER — ARIPIPRAZOLE 15 MG/1
1 TABLET ORAL
Qty: 0 | Refills: 0 | COMMUNITY

## 2019-03-20 RX ORDER — LACTOBACILLUS ACIDOPHILUS 100MM CELL
1 CAPSULE ORAL
Qty: 0 | Refills: 0 | COMMUNITY

## 2019-03-20 RX ORDER — ONDANSETRON 8 MG/1
4 TABLET, FILM COATED ORAL ONCE
Qty: 0 | Refills: 0 | Status: DISCONTINUED | OUTPATIENT
Start: 2019-03-20 | End: 2019-04-04

## 2019-03-20 RX ORDER — CLONAZEPAM 1 MG
1 TABLET ORAL
Qty: 0 | Refills: 0 | COMMUNITY

## 2019-03-20 RX ORDER — PREDNISOLONE SODIUM PHOSPHATE 1 %
1 DROPS OPHTHALMIC (EYE)
Qty: 0 | Refills: 0 | COMMUNITY

## 2019-03-20 RX ORDER — LANSOPRAZOLE 15 MG/1
30 CAPSULE, DELAYED RELEASE ORAL
Qty: 0 | Refills: 0 | COMMUNITY

## 2019-03-20 RX ORDER — BACITRACIN 500 [USP'U]/G
1 OINTMENT OPHTHALMIC
Qty: 0 | Refills: 0 | COMMUNITY

## 2019-03-20 RX ORDER — GABAPENTIN 400 MG/1
1 CAPSULE ORAL
Qty: 0 | Refills: 0 | COMMUNITY

## 2019-03-20 RX ORDER — ATROPINE SULFATE 1 %
1 DROPS OPHTHALMIC (EYE)
Qty: 0 | Refills: 0 | COMMUNITY

## 2019-03-20 RX ORDER — CLONAZEPAM 1 MG
0 TABLET ORAL
Qty: 0 | Refills: 0 | COMMUNITY

## 2019-03-20 RX ORDER — LEVOTHYROXINE SODIUM 125 MCG
1 TABLET ORAL
Qty: 0 | Refills: 0 | COMMUNITY

## 2019-03-20 NOTE — ASU PATIENT PROFILE, ADULT - PMH
Blind right eye    Cryptorchidism    Foot deformity, left    Glaucoma    Hypothyroid    Leukopenia    MR (mental retardation)    Seizures    Thrombocytopenia

## 2019-03-20 NOTE — ASU DISCHARGE PLAN (ADULT/PEDIATRIC) - CALL YOUR DOCTOR IF YOU HAVE ANY OF THE FOLLOWING:
Fever greater than (need to indicate Fahrenheit or Celsius)/Pain not relieved by Medications/Bleeding that does not stop/Swelling that gets worse

## 2019-03-20 NOTE — BRIEF OPERATIVE NOTE - NSICDXBRIEFPROCEDURE_GEN_ALL_CORE_FT
PROCEDURES:  Dental exam, with x-ray, dental cleaning, and restoration 20-Mar-2019 11:42:02  Geno Armstrong

## 2019-03-20 NOTE — BRIEF OPERATIVE NOTE - NSICDXBRIEFPOSTOP_GEN_ALL_CORE_FT
POST-OP DIAGNOSIS:  Periodontal disease 20-Mar-2019 11:43:04  Geno Armstrong  Dental caries extending into dentin 20-Mar-2019 11:42:53  Geno Armstrong

## 2019-07-01 ENCOUNTER — EMERGENCY (EMERGENCY)
Facility: HOSPITAL | Age: 54
LOS: 1 days | End: 2019-07-01
Admitting: EMERGENCY MEDICINE
Payer: MEDICARE

## 2019-07-01 DIAGNOSIS — Z98.890 OTHER SPECIFIED POSTPROCEDURAL STATES: Chronic | ICD-10-CM

## 2019-07-01 PROCEDURE — 99282 EMERGENCY DEPT VISIT SF MDM: CPT

## 2019-09-24 PROBLEM — Q53.9 UNDESCENDED TESTICLE, UNSPECIFIED: Chronic | Status: ACTIVE | Noted: 2019-03-06

## 2019-09-24 PROBLEM — D72.819 DECREASED WHITE BLOOD CELL COUNT, UNSPECIFIED: Chronic | Status: ACTIVE | Noted: 2019-03-06

## 2019-09-24 PROBLEM — E03.9 HYPOTHYROIDISM, UNSPECIFIED: Chronic | Status: ACTIVE | Noted: 2019-03-06

## 2020-01-01 ENCOUNTER — OUTPATIENT (OUTPATIENT)
Dept: OUTPATIENT SERVICES | Facility: HOSPITAL | Age: 55
LOS: 1 days | End: 2020-01-01

## 2020-01-01 ENCOUNTER — APPOINTMENT (OUTPATIENT)
Dept: RADIOLOGY | Facility: CLINIC | Age: 55
End: 2020-01-01
Payer: MEDICARE

## 2020-01-01 ENCOUNTER — INPATIENT (INPATIENT)
Facility: HOSPITAL | Age: 55
LOS: 5 days | Discharge: HOME | End: 2020-02-12
Payer: MEDICARE

## 2020-01-01 ENCOUNTER — INPATIENT (INPATIENT)
Facility: HOSPITAL | Age: 55
LOS: 1 days | End: 2020-07-12
Payer: MEDICARE

## 2020-01-01 ENCOUNTER — APPOINTMENT (OUTPATIENT)
Dept: CT IMAGING | Facility: CLINIC | Age: 55
End: 2020-01-01
Payer: MEDICARE

## 2020-01-01 ENCOUNTER — INPATIENT (INPATIENT)
Facility: HOSPITAL | Age: 55
LOS: 2 days | Discharge: HOME | End: 2020-02-27
Attending: INTERNAL MEDICINE | Admitting: INTERNAL MEDICINE
Payer: MEDICARE

## 2020-01-01 DIAGNOSIS — Z98.890 OTHER SPECIFIED POSTPROCEDURAL STATES: Chronic | ICD-10-CM

## 2020-01-01 PROCEDURE — 71045 X-RAY EXAM CHEST 1 VIEW: CPT | Mod: 26

## 2020-01-01 PROCEDURE — 99285 EMERGENCY DEPT VISIT HI MDM: CPT

## 2020-01-01 PROCEDURE — 74018 RADEX ABDOMEN 1 VIEW: CPT | Mod: 26

## 2020-01-01 PROCEDURE — 77080 DXA BONE DENSITY AXIAL: CPT

## 2020-01-01 PROCEDURE — 99285 EMERGENCY DEPT VISIT HI MDM: CPT | Mod: CS

## 2020-01-01 PROCEDURE — 93971 EXTREMITY STUDY: CPT | Mod: 26,RT

## 2020-01-01 PROCEDURE — 71260 CT THORAX DX C+: CPT | Mod: 26

## 2020-01-01 PROCEDURE — 71250 CT THORAX DX C-: CPT

## 2020-01-28 NOTE — ASU PATIENT PROFILE, ADULT - REASON FOR ADMISSION, PROFILE
TRANSFER - OUT REPORT: 
 
Verbal report given to RN Shane Peterson (name) on [place]  being transferred to room 359 (unit) for routine post - op Report consisted of patients Situation, Background, Assessment and  
Recommendations(SBAR). Information from the following report(s) SBAR, OR Summary, Procedure Summary, Intake/Output and MAR was reviewed with the receiving nurse. Lines:  
Peripheral IV 01/28/20 Left (Active) Site Assessment Clean, dry, & intact 1/28/2020 11:09 AM  
Phlebitis Assessment 0 1/28/2020 11:09 AM  
Infiltration Assessment 0 1/28/2020 11:09 AM  
Dressing Status Clean, dry, & intact 1/28/2020 11:09 AM  
Dressing Type Tape;Transparent 1/28/2020 11:09 AM  
Hub Color/Line Status Green; Infusing 1/28/2020 11:09 AM  
  
 
Opportunity for questions and clarification was provided. Patient transported with: 
 O2 @ 2 liters comprehensive dental treatment

## 2021-04-17 NOTE — PROGRESS NOTE ADULT - PROBLEM SELECTOR PLAN 3
[facility]  INITIAL CONSULT NOTE  --------------------------------------------------------------------------------  HPI:  88 yo female w/ PMHx as below (unclear outpt medications).  Brought to ER after family found pt at home on floor, black stool.  Found to have Screat 2.6 (no known baseline), High AG acidosis w/ HCO3- 10.  NC CT revealed small areas of free air under diaphragm.  BP has been fluctuating, lowest this AM w/ SBP 93, ranged from ,low 100's to 150's.  No fever.  Non-oliguric although U.O. trending down.  On LR 75cc/hr        PAST HISTORY  --------------------------------------------------------------------------------  PAST MEDICAL & SURGICAL HISTORY:  Atrial fibrillation    GERD (gastroesophageal reflux disease)    HTN (hypertension)    No significant past surgical history      FAMILY HISTORY:  No pertinent family history in first degree relatives      PAST SOCIAL HISTORY:    ALLERGIES & MEDICATIONS  --------------------------------------------------------------------------------  Allergies    No Known Allergies    Intolerances      Standing Inpatient Medications  cefTRIAXone   IVPB 2000 milliGRAM(s) IV Intermittent every 24 hours  chlorhexidine 4% Liquid 1 Application(s) Topical <User Schedule>  lactated ringers. 1000 milliLiter(s) IV Continuous <Continuous>  metoprolol tartrate Injectable 5 milliGRAM(s) IV Push every 6 hours  metroNIDAZOLE  IVPB 500 milliGRAM(s) IV Intermittent every 8 hours  pantoprazole Infusion 8 mG/Hr IV Continuous <Continuous>    PRN Inpatient Medications  ALPRAZolam 0.25 milliGRAM(s) Oral daily PRN      REVIEW OF SYSTEMS  --------------------------------------------------------------------------------  Gen: No weight changes, fatigue, fevers/chills, weakness  Skin: No rashes  Head/Eyes/Ears/Mouth: No headache; Normal hearing; Normal vision w/o blurriness; No sinus pain/discomfort, sore throat  Respiratory: No dyspnea, cough, wheezing, hemoptysis  CV: No chest pain, PND, orthopnea  GI: No abdominal pain, diarrhea, constipation, nausea, vomiting, melena, hematochezia  : No increased frequency, dysuria, hematuria, nocturia  MSK: No joint pain/swelling; no back pain; no edema  Neuro: No dizziness/lightheadedness, weakness, seizures, numbness, tingling  Heme: No easy bruising or bleeding  Endo: No heat/cold intolerance  Psych: No significant nervousness, anxiety, stress, depression    All other systems were reviewed and are negative, except as noted.    VITALS/PHYSICAL EXAM  --------------------------------------------------------------------------------  T(C): 36.1 (04-17-21 @ 17:01), Max: 36.4 (04-17-21 @ 07:05)  HR: 106 (04-17-21 @ 17:01) (82 - 137)  BP: 144/78 (04-17-21 @ 17:01) (91/50 - 163/88)  RR: 20 (04-17-21 @ 17:01) (11 - 30)  SpO2: 96% (04-17-21 @ 17:01) (79% - 98%)  Wt(kg): --  Height (cm): 165.1 (04-15-21 @ 21:18)  Weight (kg): 58 (04-15-21 @ 21:18)  BMI (kg/m2): 21.3 (04-15-21 @ 21:18)  BSA (m2): 1.64 (04-15-21 @ 21:18)      04-16-21 @ 07:01  -  04-17-21 @ 07:00  --------------------------------------------------------  IN: 2955 mL / OUT: 730 mL / NET: 2225 mL    04-17-21 @ 07:01  -  04-17-21 @ 18:10  --------------------------------------------------------  IN: 1110 mL / OUT: 775 mL / NET: 335 mL      Physical Exam:  	Gen: NAD, well-appearing, pale  	Pulm: CTA B/L  	CV: RRR, S1S2; no rub  	Back: No spinal or CVA tenderness; no sacral edema  	Abd: +BS, soft, nontender/nondistended  	: No suprapubic tenderness  	UE: Warm, FROM, no edema  	LE: Warm, FROM, no dependant edema  	Neuro: No focal deficits  	Psych: Normal affect and mood  	Skin: Warm, without rashes  	Vascular access:    LABS/STUDIES  --------------------------------------------------------------------------------              9.5    12.48 >-----------<  179      [04-17-21 @ 15:11]              29.3     149  |  111  |  63  ----------------------------<  148      [04-17-21 @ 05:32]  3.3   |  26  |  2.3        Ca     7.4     [04-17-21 @ 05:32]      Mg     2.5     [04-17-21 @ 05:32]    TPro  4.5  /  Alb  2.6  /  TBili  0.6  /  DBili  x   /  AST  156  /  ALT  48  /  AlkPhos  62  [04-17-21 @ 05:32]        Troponin 0.06      [04-16-21 @ 11:17]  CK 7080      [04-17-21 @ 05:32]    Creatinine Trend:  SCr 2.3 [04-17 @ 05:32]  SCr 2.4 [04-16 @ 05:36]  SCr 2.4 [04-15 @ 22:02]  SCr 2.6 [04-15 @ 12:53]    Urinalysis - [04-16-21 @ 11:00]      Color Yellow / Appearance Clear / SG 1.020 / pH 6.0      Gluc Negative / Ketone 15  / Bili Small / Urobili 1.0       Blood Large / Protein >=300 / Leuk Est Negative / Nitrite Negative      RBC 3-5 / WBC 1-2 / Hyaline  / Gran  / Sq Epi  / Non Sq Epi Occasional / Bacteria Moderate           with behavioral problems and agitation. Continue increased depakote dosing. Continue home dose of klonopin.  Seroquel 25mg qHS, and repeat 25mg if agitation or insomnia persists.

## 2022-01-14 NOTE — DISCHARGE NOTE ADULT - PATIENT PORTAL LINK FT
Patient
You can access the [facility], offered by [facility], by registering with the following website: [email or web address]

## 2022-03-08 NOTE — H&P PST ADULT - VENOUS THROMBOEMBOLISM BMI
How Severe Is Your Skin Lesion?: mild Have Your Skin Lesions Been Treated?: been treated Is This A New Presentation, Or A Follow-Up?: Skin Lesions Additional History: Biopsy was taken at previous provider and came back positive carcinoma in situ of skin, unspecified (1) obesity (BMI greater than 25) (0) indicator not present

## 2022-04-07 NOTE — PATIENT PROFILE ADULT. - SOCIAL CONCERNS
Testing needed on DOS.  Covid testing on 04/10/2022 for DOS 04/12/2022 Anterior cervical spine surgery.  Will be bringing completed Advanced Directives w/ her on DOS to be scanned into system.    Instructed to take the following medications the morning of surgery w/ sips of water: Gabapentine, hydralazine, topiramate, metoprolol, omeprazole and, if needed, ondansetron, hydrocodone and cyclobenzaprine.   Will be wearing Clonidine patch - instructed to date and time patch with next application and show receiving RN where patch is on DOS.    Resource phone numbers shared for further concerns.  Questions answered; support given.    Pt informed of current Covid 19 precaution policy- Upon entry, all pts, caregivers/drivers will be screened & required to wear masks; visitors limited to two adults.   parking unavailable. Verbalized understanding.   group home

## 2022-12-29 NOTE — DISCHARGE NOTE ADULT - REASON FOR ADMISSION
Writer confirming with patient's daughter medications:     Per Analisa, patient is takin mg of Jardiance   1000 mg of Metformin twice daily  Actos 45mg  Glimepiride 4 mg daily   found down

## 2023-02-23 NOTE — H&P PST ADULT - ACTIVITY
Subjective      REASONS FOR FOLLOWUP:   1. T2N1M0, ER/LA negative, HER2 positive breast cancer.   2. BRCA 1 and 2 negative.   3. Adriamycin/Cytoxan followed by Taxol and Herceptin, started on 11/08/2013.   4. Syncopal episode after cycle #3 with stable echo, chemotherapy continued.   5. FUO after cycle 4 of chemotherapy associated with severe mouth pain, marked elevation of liver function tests and ferritin to 44,000, stable echocardiogram, stable cardiac function, ?etiology, and query drug reaction versus viral infection.   6. Taxol weekly initiated on 02/13/2014. Close followup of LFTs and cardiac function.   7. Echocardiogram 03/05/2014 stable at 63%. Treatment continued with Taxol and Herceptin.   8. Neuropathy grade 2. Taxol decreased 15% on 03/20/2014.   9. Q.3 week Herceptin started 05/23/2014 with plans for radiation consult.   10. Itching with Herceptin. Hydrocortisone and Pepcid added to care plan.   11. Patient seen 07/25/2014 with mild reduction in ejection fraction noted. Fo llowup echocardiogram scheduled prior to next visit month, Herceptin continued with hydrocortisone and Pepcid pre-medications.   12. Further drop in the ejection fraction to 53%. Because of some dizziness and near syncope, treatment with Herceptin held with planned followup in 1 month with repeat echocardiogram. MRI done for headaches was negative.   13. EF up to 64%. Herceptin resumed. Previous echocardiogram felt to be suboptimal due to lack of contrast.   14. Syncopal episode 12/19/2014 with a negative cardiac evaluation. Neurology consult recommended.   15. MRI done for headaches. Negative in August 2014.   16. CT of the abdomen done at [facility] in 10/2014 shows a liver lesion, which was indeterminate. They recommended MRI. We have compared to a previous MRI in 2014 and established this is hemangioma.                     17. MGUS Recurrent syncope workup found incidental IgG kappa                               monoclonal  gammopathy  Workup negative negative         History of Present Illness patient is a 54-year-old lady with a node positive HER-2 positive ER negative breast cancer 10 years from completion of adjuvant chemotherapy.      She comes in today for follow-up and overall she is doing well but   She also has an incidentally found IgG MGUS which we are following and appears to gradually increasing both her M protein and free light chain ratio .her M protein is 1.3 g and we will check a little more closely at 3-month intervals for now  She had a hip replaced and is doing well from that    She is having some issues with lymphedema in her left hand and also some pain in her left neck with radicular type symptoms into her left arm and I have asked for a C-spine MRI to evaluate this but this is almost certainly benign and degenerative-C-spine MRI does show significant degenerative disease and physical therapy has helped her left shoulder discomfort  Her CBC stable except for mild anemia and she has had this in the past and we will keep an eye on it       Active Ambulatory Problems     Diagnosis Date Noted   • Bilateral malignant neoplasm of upper outer quadrant of breast in female ([facility]) 04/15/2016   • Chronic pain of left knee 01/11/2018   • Acute pain of left knee 01/11/2018   • Contusion of left knee 01/11/2018   • Arthritis of left knee 02/09/2018   • Chondromalacia, patella, left 02/09/2018   • MGUS (monoclonal gammopathy of unknown significance) 06/08/2018   • Arthritis of left hip 01/16/2019   • Left hip pain 01/16/2019   • Status post administration of cardiotoxic chemotherapy 06/23/2021   • Precordial pain 06/23/2021     Resolved Ambulatory Problems     Diagnosis Date Noted   • No Resolved Ambulatory Problems     Past Medical History:   Diagnosis Date   • Acquired hypothyroidism    • Anemia    • Arthritis    • Asthma    • Cancer ([facility]) 2013   • Chest pain    • H/O Chondromalacia patellae, left    • H/O  Regurgitation, mitral/triscupid, mild    • H/O Syncope w/collapse    • Heart murmur    • History of chronic fatigue    • Hx of bladder infections    • Lymphedema    • Migraines    • MVP (mitral valve prolapse)    • Pneumonia      SURGICAL HISTORY: Fibroadenoma from the left breast in . Uterine fibroids removed laparoscopically in . Left knee arthroscopy in  and hysterectomy in . Ovaries were left in place.     GYN HISTORY: Menarche age 11.  0. She obviously stopped menstruating at the time of her hysterectomy in  and is having hot flashes.     HEMATOLOGIC/ONCOLOGIC HISTORY:    The  patient initially seen on 10/18/2013, a 46-year-old  female who works as a  at the [employer] ' s office who has a long history of fibrocystic breast disease with multiple ultrasounds and mammograms in the past to evaluate c ysts but after her most recent mammogram, because of some abnormalities, an ultrasound was recommended on 2013 at [facility]. The targeted ultrasound showed 3 circumscribed solid masses in the left breast at the 2 o' clock position, axillar y tail of the breast. The appearance was suggestive of intramammary lymph nodes without a definite fatty hilum which was worrisome for tumor involvement. One of the 3 masses had actually increased in size compared to 2 of the other lesions which were un changed from 2010 suggesting benign etiology. Because of the greater than 20% interval increase in size of one of these nodules since the last mammogram, ultrasound guided biopsy was recommended.   Ultrasound-guided biopsy was done on 2013 and this biopsy showed findings highly suspicious for lymph node involvement by ductal carcinoma and the patient then went to see Dr. Witt who sent her for another biopsy on 2013 and a 1.5 cm mass was seen at the 3 o' clock position of the left breast in luan tion to the 3 abnormal axillary masses which were  felt to be lymph nodes. This area was biopsied on 08/19/2013 and the path report showed invasive ductal carcinoma and DCIS grade 3 with the largest focus of invasive cancer being 1 cm and DCIS was interme d iate to high grade. The left axillary lymph node was core biopsied and confirmed metastatic carcinoma. ER/KY were negative, HER2 was 3+ positive. This led to an MRI of the breast 08/27/2013 which showed 3 cm mass in the lower outer quadrant of the left breast and additional clump enhancement in the middle and anterior third of the left breast at the 3 o' clock axis suspicious for in situ carcinoma and 3 rounded masses measured up to 9 mm in greatest dimension are noted consistent with lymph nodes. One ha d a clip from the recent biopsy. Right breast showed stippled foci of variable enhancement with no dominant or clumped findings, prominent right axillary lymph nodes were noted and right axillary lymph node biopsy was done. This was nondiagnostic.   The patient underwent bone scan on 09/09/2013 which was unremarkable except for some increased activity at T9 and the right part of L3 which was again felt to be nonspecific but plain films were recommended. CT scan of the chest, abdomen and pelvis was done which was also unremarkable except for prominent intramammary nodes in the upper outer left breast and several prominent axillary lymph nodes bilaterally one of which has been biopsied on the right. Appearance raised the concern of possible metastatic to both axillae but there were no other sites of involvement. Bone density dated 09/20/2013 was normal. Patient then went for surgery on 09/25/2013 at which time she underwent right mastectomy which was unremarkable and left total mastectomy with axillary dissection which showed a 2.9 cm grade 3 infiltrating ductal carcinoma with associated DCIS, margins were uninvolved. Three of 21 lymph nodes showed metastatic disease making this a pT2N1.   The patient has done well  from surgery but has had a lot of prob lems with anxiety and hot flashes but otherwise has done well. She went to see Dr. Cedeno for recommendations and then came to see us for further discussion of treatment options. In addition, the patient had BRCA testing which thankfully was negative.   T he patient had a MUGA scan, which showed an ejection fraction of 50% and no other abnormalities. Her plain films of the abnormal spots on her bone scan were negative. She had port placed and is ready to start treatment. The patient was presented at the [facility] board and the right axillary lymph nodes were felt to be insignificant and plans for dose-dense AC/Taxol followed by Herceptin for a year and radiation to be considered because of the carroll disease.   When she had CT scans of the chest, abdomen and pelvis, they were unremarkable except for hemangioma in the liver and prominent intramammary nodes in the upper outer quadrant of the left breast with several prominent axillary nodes bilaterally.   The patient started dose-dense AC with fairly good tolerance except for some mucositis after cycle 4. She had a febrile illness that required hospitalization from 01/02/2014 - 01/15/2014 with a fever of unknown origin. Multiple viral cultures were negative including CMV, EBV, herpes, respiratory panel and hepatiti s profile was negative. Mouth multiple blood and urine cultures and throat cultures were negative except for Haemophilus parainfluenza for which a course of amoxicillin was given. During the hospitalization the patient spiked fevers continuously and then developed rapid elevation of her liver tests with a ferritin which went up as high as 44,000. The patient defervesced finally and the liver profile started coming down. Echo with bubble echo during her stay was normal with ejection fraction of 60%. The p atient was discharged home with plans to resume chemotherapy cautiously once her numbers stabilized.   The patient was  seen on 01/22/2014 feeling much better. She still is not very active, feeling fatigued and deconditioned from lying in bed for almost 2 we eks. The decision was made because of borderline blood pressure of 94/70 and some mild tachycardia to have her be more active and exercise for another week and reevaluate to start Herceptin on 01/30/2014 with Taxol to be added after 2 doses of Herceptin i f she is tolerating the treatment well and her liver functions are normal. A bone marrow biopsy was also done during her hospital stay and no signs of hemophagocytosis or dysplasia although there was some aberrant myeloid antigen of uncertain significanc e which has been seen with myelodysplasia. She was also seen briefly by Dr. Fatmata Tolbert of Rheumatology while in the hospital for low positive ARTEMIO of 1:160 and anti-SSA antibody greater than 8 but Dr. Tolbert felt this was unlikely to be rheumatologica l disease because 4 doses of Cytoxan should not have precipitated this.   The patient initiated weekly Herceptin for 2 weeks with no significant problems. Taxol weekly initiated on 02/13/2014 with close followup of LFTs and cardiac function.   The patient was seen on 03/06/2014 with echo showing EF of 63%. We will continue Taxol and Herceptin weekly and watch her LFTs closely. Neurontin causes dizziness and she is not taking it. We will hold off on Effexor for the time being.   Patient required Neupogen for 3 doses because of an ANC of 1.06 with dose 4 and Neupogen x3 is added for each dose.   The patient is seen 03/20/2014 doing fairly well. Taxol dose decreased to 15% because of neuropathy. Neupogen continued.   The patient was seen on 05/23/2014 doing well. Herceptin continued with plans to check an echocardiogram before her next treatment in 3 weeks.   MRI of the liver confirmed hemangioma. Echocardiogram is stable at 59%. Herceptin continued at 3-week intervals with the addition of Pepcid and Solu-Cortef 50 mg and the  patient is almost certainly going to agree to radiation for her 3 node status, especially since the pathologist at [facility] thought there was extranodal extension.   The patient was seen 07/25/2014. Echocardiogram results noted, slightly reduced from previous, the patient proceeding through radiation therapy of left chest wall. Followup echocardiogram scheduled after her Herceptin therapy 07/25/2014.   Patient was seen on 08/14/2014 with EF of 53%. I discussed th e case with Dr. Bebe Fernandez and did not use the definitive contrast and this may explain the differences, but based on the fact that she had some near syncope on one occasion this weekend and the fact that she has had radiation to the left chest area with a significant skin burn, we will hold Herceptin today and bring her back in 4 weeks, but check her LFTs and ferritin again today.   The patient was 11/14/2014 doing well with ejection fraction of 63% and echocardiogram was reviewed. Herceptin will be continued until mid-January.   The patient is seen 12/29/2014. She had a syncopal episode 12/19/2014. ER workup was negative and neurological evaluation was ordered. Bone scan ordered for right hip pain. If this is negative she will have her port removed in J anuary and she is planning to delay reconstruction for a year.   The patient was evaluated for some pain in her back with a bone scan which was essentially unremarkable except for some degenerative changes at L3, and T6-7-8. Because we were concerned, we ordered an MRI of the thoracic and lumbar spine which was benign with just DJD. No further followup is needed at this time with regard to scans.   Patient seen on 05/08/2015, doing well; occasional migraine-type headache persists. Port has been removed. She is thinking about reconstruction sometime at the end of the year.   CT of the abdomen done at [facility] in 10/2014 shows a liver lesion, which was indeterminate.  They recommended MRI. We have compared to a previous MRI in 2014 and established this is hemangioma.   Patient seen on 12/18/2015 having had CT scans of the chest and abdo men at [facility] for some abdominal discomfort that showed a 3 cm lesion in the dome of the liver, which they were worried about and recommended an MRI, but we reviewed the scans and also compared to the previous MRI of the liver d one in 2014 in October and confirmed that this indeed was a hemangioma that has been stable since 2013. An echo was also done which showed an ejection fraction of 60% and stable.   7/17   patient is a 49-year-old  female with an ER/TX negative HER-2 positive breast cancer node positive T2 N1 M0 treated with Adriamycin and Cytoxan followed by Taxol Herceptin was completed in late 2014 .  He is here for routine follow-up and overall she is doing well she was diagnosed with asthma and  is on an inhaler with good improvement.  She has mild hot flashes but has mood swings and menopausal symptoms as reported into menopause with chemotherapy.  She has no unusual aches or pains but otherwise is doing well and working full-time.  She is not interested in reconstruction at this point.  We did discuss doing extended genetic testing because of her family history we referred her for the extended testing but she has not done that yet She did have a screening colonoscopy and this was negative.  DEXA scan was normal in April of this year.  She is still very anxious about recurrence which is understandable but getting better with time.  7/18  0.9 g IgG kappa MGUS noted the negative skeletal survey and urine protein.  Bone marrow planned workup for recurrent syncope underway per cardiology    10/18  having intermittent episodes of syncope which sounds vasovagal the last one was in May when she was at a bar drinking with friends .    she's had an echo and a stress test and an EEG andbrain MRI  which  was reportedly benign.  She is currently wearing a event monitor During the workup of these syncopal episodes the neurologist ordered an immunoelectrophoresis which showed an IgG paraprotein of 0.8 g/dL.  Need to evaluate this further but I don't think this is anything to do with her syncope unless she has an autonomic neuropathy but she was sitting down at the time she felt nausea  and had her syncopal episode that does not appear to be orthostatic  We reviewed her skeletal survey which is essentially negative her urine did not show any significant Bence Witt protein she has a 0.9 g/dL M protein with a mildly elevated light chain ratio and a mildly elevated beta-2 microglobulin of 2.5      I recommended a bone marrow biopsy and staining for amyloid and this was done and her bone marrow showed 5% plasma cells which are monotypic and have a 13q minus but no amyloid deposition-I explained that she has an MGUS but can proceed to myeloma over time especially because she has an abnormal free light chain ratio and we will watch her M protein closely  I'm not sure this explains her syncope        SOCIAL HISTORY: Single. . She does not smoke. Drinks very little. No drug history.     FAMILY HISTORY: Both of her parents are in their 60s. Mother had kidney cancer and had a partial kidney resection. Brother  of head trauma, another is healthy. She has two sisters who are healthy. She has 2 paternal aunts with breast cancer; one is alive in her 70s, the other  at 48 of breast cancer. A paternal uncle with kidney cancer. Paternal grandfather with colon cancer in his 60s. Multiple paternal cousins with cancer inc luding leukemia and kidney cancer. No other history of breast or ovarian cancer in the family.     Review of Systems   Constitutional: Positive for fatigue.   Respiratory: Negative for chest tightness.    Gastrointestinal: Negative for constipation, diarrhea, nausea and vomiting.   Musculoskeletal:  "Positive for back pain (same 8/20/2020) and neck pain. Negative for arthralgias and gait problem.   Skin: Negative for rash.   Allergic/Immunologic: Negative for environmental allergies.   Neurological: Negative for numbness (Chronic from chemo).   Psychiatric/Behavioral: Nervous/anxious: little better 8/20/2020.       A comprehensive 14 point review of systems was performed and was negative except as mentioned.    Medications:  The current medication list was reviewed in the EMR    ALLERGIES:    Allergies   Allergen Reactions   • Ciprofloxacin Hives   • Sulfa Antibiotics Hives   • Penicillins Nausea And Vomiting   • Amoxicillin Nausea And Vomiting       Objective      Vitals:    02/23/23 0949   BP: 117/77   Pulse: 76   Resp: 18   Temp: 97.5 °F (36.4 °C)   TempSrc: Temporal   SpO2: 98%   Weight: 72.3 kg (159 lb 6.4 oz)   Height: 160 cm (62.99\")   PainSc: 0-No pain     Current Status 2/23/2023   ECOG score 0       Physical Exam    GENERAL:  Well-developed, well-nourished in no acute distress.   SKIN:  Warm, dry without rashes, purpura or petechiae.   HEAD:  Normocephalic.  EYES:  Pupils equal, round and reactive to light.  EOMs intact.  Conjunctivae normal.  EARS:  Hearing intact.  NOSE:  Septum midline.  No excoriations or nasal discharge.  MOUTH:  Tongue is well-papillated; no stomatitis or ulcers.  Lips normal.  THROAT:  Oropharynx without lesions or exudates.  NECK:  Supple with good range of motion; no thyromegaly or masses, no JVD.  LYMPHATICS:  No cervical, supraclavicular, axillary or inguinal adenopathy.  CHEST:  Lungs clear to percussion and auscultation. Good airflow.  BREASTS: Bilateral chest wall is benign with no axillary adenopathy-  CARDIAC:  Regular rate and rhythm without murmurs, rubs or gallops. Normal S1,S2.  ABDOMEN:  Soft, nontender with no organomegaly or masses.  EXTREMITIES:  No clubbing, cyanosis or edema.  Mild lymphedema left forearm  NEUROLOGICAL:  Cranial Nerves II-XII grossly intact.  " able to walk a few steps with assistance No focal neurological deficits.  PSYCHIATRIC:  Normal affect and mood.    I have reexamined the patient and the results are consistent with the previously documented exam. Jorge Stinson MD       RECENT LABS:  Lab Results   Component Value Date    WBC 4.97 02/16/2023    HGB 10.9 (L) 02/16/2023    HCT 33.0 (L) 02/16/2023    MCV 88.0 02/16/2023     02/16/2023     Lab Results   Component Value Date    GLUCOSE 91 02/16/2023    BUN 20 02/16/2023    CREATININE 0.86 02/16/2023    EGFR 78.9 02/16/2023    BCR 23.3 02/16/2023    K 4.0 02/16/2023    CO2 22.0 02/16/2023    CALCIUM 9.3 02/16/2023    PROTENTOTREF 7.5 02/16/2023    ALBUMIN 4.4 02/16/2023    ALBUMIN 3.8 02/16/2023    LABIL2 1.1 02/16/2023    AST 26 02/16/2023    ALT 18 02/16/2023         Assessment & Plan   1.  T2 N1 ER/NJ negative HER-2 positive left breast cancer 2013 treated with Adriamycin Cytoxan Taxol Herceptin and bilateral mastectomies plus radiation 9 Years from diagnosis  2.  BRCA negative VUS ABRAXAS1  3.  Hemangioma of the liver  4.  family history of malignancy  5.  Mild chronic anemia iron normal -scope 8/19 neg  6.  Anxiety intolerant of Effexor ?  Zyprexa 2.5 mg   7. Recurrent syncope without obvious precipitating cause workup underway  8.  Incidental finding of a monoclonal gammopathy IgG kappa skeletal survey negative bone marrow 8/18  showing 5% plasma cells with no amyloid  · M protein 1.1 g in 8/22 with abnormal light chain ratio which is stable  · M protein up to 1.3 in 2/23 with increasing light chain ratio abnormality closer follow-up planned  9.  Left neck pain and radicular symptoms?  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  see me in 6 months             2/23/2023      CC:

## 2023-10-13 NOTE — PROGRESS NOTE ADULT - PROBLEM/PLAN-4
Patient Notified of result note and verbalized understanding.  
Patient Notified of result note and verbalized understanding.  
Please notify the patient that all results are normal.
Routing to [facility]. 
DISPLAY PLAN FREE TEXT

## 2024-06-05 NOTE — PHYSICAL THERAPY INITIAL EVALUATION ADULT - REFERRING PHYSICIAN, REHAB EVAL
Noted, no need to further adjust patient's blood pressure medications at this time.  Recommend he continue along with current prescriptions given risks of overtreating leading to potential falls and injury for patient.    Recommend patient continue with blood pressure medication on a daily regular basis to ensure continued appropriate treatment of his hypertension.   Brannon Busch

## 2024-12-30 NOTE — PROGRESS NOTE ADULT - ASSESSMENT
Petra Ventura Ma [facility]  Patient is scheduled for an EGD/colonoscopy with Dr Altamirano on 2/3/2025. Please send Suprep prep to patient's selected pharmacy.       Thank you,  GI Preadmit Surgery Scheduler    Suprep has been sent to patient pharmacy of choice.     This is a 51 y/o male with history of seizures and MR with new left parafrontal epidural and a minimal subdural hematoma/TBI now with functional deficits after sustaining fall.     #dispo: 8/15/18 to group home at  [facility] which is baseline.   to come for training today.